# Patient Record
Sex: MALE | Race: ASIAN | NOT HISPANIC OR LATINO
[De-identification: names, ages, dates, MRNs, and addresses within clinical notes are randomized per-mention and may not be internally consistent; named-entity substitution may affect disease eponyms.]

---

## 2019-10-08 PROBLEM — Z00.00 ENCOUNTER FOR PREVENTIVE HEALTH EXAMINATION: Status: ACTIVE | Noted: 2019-10-08

## 2019-10-21 ENCOUNTER — APPOINTMENT (OUTPATIENT)
Dept: ENDOCRINOLOGY | Facility: CLINIC | Age: 59
End: 2019-10-21
Payer: COMMERCIAL

## 2019-10-21 VITALS
HEART RATE: 81 BPM | SYSTOLIC BLOOD PRESSURE: 120 MMHG | OXYGEN SATURATION: 99 % | WEIGHT: 156 LBS | BODY MASS INDEX: 24.78 KG/M2 | DIASTOLIC BLOOD PRESSURE: 70 MMHG | HEIGHT: 66.5 IN

## 2019-10-21 DIAGNOSIS — E11.65 TYPE 2 DIABETES MELLITUS WITH HYPERGLYCEMIA: ICD-10-CM

## 2019-10-21 LAB — GLUCOSE BLDC GLUCOMTR-MCNC: 159

## 2019-10-21 PROCEDURE — 99215 OFFICE O/P EST HI 40 MIN: CPT | Mod: 25

## 2019-10-21 PROCEDURE — 82962 GLUCOSE BLOOD TEST: CPT

## 2019-10-21 NOTE — HISTORY OF PRESENT ILLNESS
[FreeTextEntry1] : Prior patent of Dr Morris, seeing me for the first time\par h/io \par type 2 DM \par dxed 20 yrs back \par on degludec  10-11 units daily \par NovoLog 10-12 units with meals, takes 3 meals a day \par eye exam is due s\par has low BG very rarely \par BG log reviewed feet exam in sep 2018 - redone today \par c/o itching around his neck \par no other complains offered\par stopped statin by himself. \par Review of system \par no chest pain, no palpitations \par no Shortness of breath,no wheezing. \par no heat or cold intolerance ,no flushing \par no headache, no numbness or tingling. \par \par \par \par

## 2019-10-22 LAB
ALBUMIN SERPL ELPH-MCNC: 4.7 G/DL
ANION GAP SERPL CALC-SCNC: 10 MMOL/L
BUN SERPL-MCNC: 14 MG/DL
CALCIUM SERPL-MCNC: 9.3 MG/DL
CHLORIDE SERPL-SCNC: 103 MMOL/L
CHOLEST SERPL-MCNC: 180 MG/DL
CHOLEST/HDLC SERPL: 3.9 RATIO
CO2 SERPL-SCNC: 28 MMOL/L
CREAT SERPL-MCNC: 0.88 MG/DL
CREAT SPEC-SCNC: 101 MG/DL
ESTIMATED AVERAGE GLUCOSE: 180 MG/DL
GLUCOSE SERPL-MCNC: 159 MG/DL
HBA1C MFR BLD HPLC: 7.9 %
HDLC SERPL-MCNC: 46 MG/DL
LDLC SERPL CALC-MCNC: 117 MG/DL
MICROALBUMIN 24H UR DL<=1MG/L-MCNC: <1.2 MG/DL
MICROALBUMIN/CREAT 24H UR-RTO: NORMAL MG/G
PHOSPHATE SERPL-MCNC: 3.2 MG/DL
POTASSIUM SERPL-SCNC: 4.6 MMOL/L
SODIUM SERPL-SCNC: 141 MMOL/L
TRIGL SERPL-MCNC: 86 MG/DL

## 2019-10-23 ENCOUNTER — RECORD ABSTRACTING (OUTPATIENT)
Age: 59
End: 2019-10-23

## 2019-10-23 DIAGNOSIS — E29.1 TESTICULAR HYPOFUNCTION: ICD-10-CM

## 2019-10-23 DIAGNOSIS — N52.9 MALE ERECTILE DYSFUNCTION, UNSPECIFIED: ICD-10-CM

## 2019-10-29 ENCOUNTER — MEDICATION RENEWAL (OUTPATIENT)
Age: 59
End: 2019-10-29

## 2020-01-27 ENCOUNTER — APPOINTMENT (OUTPATIENT)
Dept: ENDOCRINOLOGY | Facility: CLINIC | Age: 60
End: 2020-01-27
Payer: COMMERCIAL

## 2020-01-27 VITALS
SYSTOLIC BLOOD PRESSURE: 120 MMHG | OXYGEN SATURATION: 97 % | HEART RATE: 94 BPM | WEIGHT: 157 LBS | BODY MASS INDEX: 24.96 KG/M2 | DIASTOLIC BLOOD PRESSURE: 64 MMHG

## 2020-01-27 DIAGNOSIS — Z83.3 FAMILY HISTORY OF DIABETES MELLITUS: ICD-10-CM

## 2020-01-27 DIAGNOSIS — Z78.9 OTHER SPECIFIED HEALTH STATUS: ICD-10-CM

## 2020-01-27 LAB — GLUCOSE BLDC GLUCOMTR-MCNC: 177

## 2020-01-27 PROCEDURE — 82962 GLUCOSE BLOOD TEST: CPT

## 2020-01-27 PROCEDURE — 99214 OFFICE O/P EST MOD 30 MIN: CPT | Mod: 25

## 2020-01-27 NOTE — HISTORY OF PRESENT ILLNESS
[FreeTextEntry1] : Prior patent of Dr Morris, seeing me for the first time\par h/io \par type 2 DM \par dxed 20 yrs back \par on degludec  10-11 units daily \par NovoLog 10-12 units with meals, takes 3 meals a day \par eye exam is due s\par has low BG very rarely \par BG log reviewed feet exam in sep 2018 - redone today \par c/o itching around his neck \par no other complains offered\par stopped statin by himself. \par Review of system \par no chest pain, no palpitations \par no Shortness of breath,no wheezing. \par no heat or cold intolerance ,no flushing \par no headache, no numbness or tingling. \par \par \par \par 01/27/2020- FOLLOW UP\par discussed labs from last time \par A1c was not at goal \par this time BG log suggest BG in target \par low BG 1-2 times a month \par did not start statin, LDL is 120 mg % \par still working night shifts\par Review of system \par no chest pain, no palpitations \par no Shortness of breath,no wheezing. \par \par

## 2020-01-28 LAB
ESTIMATED AVERAGE GLUCOSE: 197 MG/DL
HBA1C MFR BLD HPLC: 8.5 %

## 2020-05-11 ENCOUNTER — APPOINTMENT (OUTPATIENT)
Dept: ENDOCRINOLOGY | Facility: CLINIC | Age: 60
End: 2020-05-11

## 2021-01-22 ENCOUNTER — APPOINTMENT (OUTPATIENT)
Dept: ENDOCRINOLOGY | Facility: CLINIC | Age: 61
End: 2021-01-22
Payer: MEDICAID

## 2021-01-22 ENCOUNTER — LABORATORY RESULT (OUTPATIENT)
Age: 61
End: 2021-01-22

## 2021-01-22 VITALS
OXYGEN SATURATION: 98 % | SYSTOLIC BLOOD PRESSURE: 160 MMHG | HEIGHT: 66.5 IN | DIASTOLIC BLOOD PRESSURE: 70 MMHG | HEART RATE: 80 BPM | BODY MASS INDEX: 26.05 KG/M2 | WEIGHT: 164 LBS

## 2021-01-22 LAB — GLUCOSE BLDC GLUCOMTR-MCNC: 153

## 2021-01-22 PROCEDURE — 99072 ADDL SUPL MATRL&STAF TM PHE: CPT

## 2021-01-22 PROCEDURE — 82962 GLUCOSE BLOOD TEST: CPT

## 2021-01-22 PROCEDURE — 99214 OFFICE O/P EST MOD 30 MIN: CPT | Mod: 25

## 2021-01-22 RX ORDER — SIMVASTATIN 20 MG/1
20 TABLET, FILM COATED ORAL
Refills: 0 | Status: DISCONTINUED | COMMUNITY
End: 2021-01-22

## 2021-01-22 NOTE — HISTORY OF PRESENT ILLNESS
[FreeTextEntry1] : Prior patent of Dr Morris, seeing me for the first time\par h/io \par type 2 DM \par dxed 20 yrs back \par on degludec  10-11 units daily \par NovoLog 10-12 units with meals, takes 3 meals a day \par eye exam is due s\par has low BG very rarely \par BG log reviewed feet exam in sep 2018 - redone today \par c/o itching around his neck \par no other complains offered\par stopped statin by himself. \par Review of system \par no chest pain, no palpitations \par no Shortness of breath,no wheezing. \par no heat or cold intolerance ,no flushing \par no headache, no numbness or tingling. \par \par \par \par 01/27/2020- FOLLOW UP\par discussed labs from last time \par A1c was not at goal \par this time BG log suggest BG in target \par low BG 1-2 times a month \par did not start statin, LDL is 120 mg % \par still working night shifts\par Review of system \par no chest pain, no palpitations \par no Shortness of breath,no wheezing. \par \par \par 01/22/2021- FOLLOW UP\par Patient continues to take Tresiba 11 units and sliding scale.  He is having frequent hypoglycemia to the tune of 2 times per week.  He had recent labs with his primary care physician approximately 2 months back A1c was elevated and had suppressed TSH which was not followed up on.  He has parotid swelling which was imaged and is being followed up at this point of time.  He has started on statin which she is taking regularly.  He is also seeing his eye physician and has trouble with his left eye.

## 2021-01-22 NOTE — REASON FOR VISIT
[Follow - Up] : a follow-up visit [Follow-Up: _____] : a [unfilled] follow-up visit [FreeTextEntry1] : TYpe 2 diabetes

## 2021-01-28 LAB
ALBUMIN SERPL ELPH-MCNC: 4.4 G/DL
ANION GAP SERPL CALC-SCNC: 14 MMOL/L
BUN SERPL-MCNC: 14 MG/DL
CALCIUM SERPL-MCNC: 9.3 MG/DL
CHLORIDE SERPL-SCNC: 104 MMOL/L
CHOLEST SERPL-MCNC: 192 MG/DL
CO2 SERPL-SCNC: 23 MMOL/L
CREAT SERPL-MCNC: 1 MG/DL
CREAT SPEC-SCNC: 107 MG/DL
ESTIMATED AVERAGE GLUCOSE: 223 MG/DL
GLUCOSE SERPL-MCNC: 152 MG/DL
HBA1C MFR BLD HPLC: 9.4 %
HDLC SERPL-MCNC: 52 MG/DL
LDLC SERPL CALC-MCNC: 122 MG/DL
MICROALBUMIN 24H UR DL<=1MG/L-MCNC: <1.2 MG/DL
MICROALBUMIN/CREAT 24H UR-RTO: NORMAL MG/G
NONHDLC SERPL-MCNC: 140 MG/DL
PHOSPHATE SERPL-MCNC: 3.1 MG/DL
POTASSIUM SERPL-SCNC: 4.7 MMOL/L
SODIUM SERPL-SCNC: 142 MMOL/L
THYROGLOB AB SERPL-ACNC: <20 IU/ML
THYROPEROXIDASE AB SERPL IA-ACNC: 10.6 IU/ML
TRIGL SERPL-MCNC: 90 MG/DL
TSH RECEPTOR AB: <1.1 IU/L
TSI ACT/NOR SER: <0.1 IU/L

## 2021-02-05 ENCOUNTER — APPOINTMENT (OUTPATIENT)
Dept: ENDOCRINOLOGY | Facility: CLINIC | Age: 61
End: 2021-02-05
Payer: MEDICAID

## 2021-02-05 PROCEDURE — ZZZZZ: CPT

## 2021-02-09 ENCOUNTER — APPOINTMENT (OUTPATIENT)
Dept: ENDOCRINOLOGY | Facility: CLINIC | Age: 61
End: 2021-02-09
Payer: MEDICAID

## 2021-02-09 PROCEDURE — 95250 CONT GLUC MNTR PHYS/QHP EQP: CPT

## 2021-02-09 PROCEDURE — 99072 ADDL SUPL MATRL&STAF TM PHE: CPT

## 2021-02-09 PROCEDURE — 95251 CONT GLUC MNTR ANALYSIS I&R: CPT

## 2021-02-22 ENCOUNTER — APPOINTMENT (OUTPATIENT)
Dept: ENDOCRINOLOGY | Facility: CLINIC | Age: 61
End: 2021-02-22
Payer: MEDICAID

## 2021-02-22 VITALS
SYSTOLIC BLOOD PRESSURE: 158 MMHG | DIASTOLIC BLOOD PRESSURE: 70 MMHG | HEART RATE: 79 BPM | HEIGHT: 66.5 IN | BODY MASS INDEX: 26.2 KG/M2 | OXYGEN SATURATION: 98 % | WEIGHT: 165 LBS

## 2021-02-22 LAB — GLUCOSE BLDC GLUCOMTR-MCNC: 191

## 2021-02-22 PROCEDURE — 99214 OFFICE O/P EST MOD 30 MIN: CPT | Mod: 25

## 2021-02-22 PROCEDURE — 82962 GLUCOSE BLOOD TEST: CPT

## 2021-02-22 PROCEDURE — 99072 ADDL SUPL MATRL&STAF TM PHE: CPT

## 2021-02-22 NOTE — HISTORY OF PRESENT ILLNESS
[FreeTextEntry1] : Prior patent of Dr Morris, seeing me for the first time\par h/io \par type 2 DM \par dxed 20 yrs back \par on degludec  10-11 units daily \par NovoLog 10-12 units with meals, takes 3 meals a day \par eye exam is due s\par has low BG very rarely \par BG log reviewed feet exam in sep 2018 - redone today \par c/o itching around his neck \par no other complains offered\par stopped statin by himself. \par Review of system \par no chest pain, no palpitations \par no Shortness of breath,no wheezing. \par no heat or cold intolerance ,no flushing \par no headache, no numbness or tingling. \par \par \par \par 01/27/2020- FOLLOW UP\par discussed labs from last time \par A1c was not at goal \par this time BG log suggest BG in target \par low BG 1-2 times a month \par did not start statin, LDL is 120 mg % \par still working night shifts\par Review of system \par no chest pain, no palpitations \par no Shortness of breath,no wheezing. \par \par \par 01/22/2021- FOLLOW UP\par Patient continues to take Tresiba 11 units and sliding scale.  He is having frequent hypoglycemia to the tune of 2 times per week.  He had recent labs with his primary care physician approximately 2 months back A1c was elevated and had suppressed TSH which was not followed up on.  He has parotid swelling which was imaged and is being followed up at this point of time.  He has started on statin which she is taking regularly.  He is also seeing his eye physician and has trouble with his left eye.\par \par \par 02/22/2021- FOLLOW UP\par Patient continues to take Tresiba 12 units and sliding scale.  He does have hypoglycemia around 12 midnight and post lunch.  I discussed the sliding scale with him and on further questioning I realized that he has been dosing himself more than his sliding scale which does make sense as he has been getting frequent hypoglycemia.  He is more compliant with his diet now.

## 2021-02-22 NOTE — ASSESSMENT
[Hypoglycemia Management] : hypoglycemia management [Action and use of Insulin] : action and use of short and long-acting insulin

## 2021-03-26 ENCOUNTER — APPOINTMENT (OUTPATIENT)
Dept: ENDOCRINOLOGY | Facility: CLINIC | Age: 61
End: 2021-03-26

## 2021-05-03 ENCOUNTER — APPOINTMENT (OUTPATIENT)
Dept: ENDOCRINOLOGY | Facility: CLINIC | Age: 61
End: 2021-05-03

## 2021-07-26 ENCOUNTER — APPOINTMENT (OUTPATIENT)
Dept: ENDOCRINOLOGY | Facility: CLINIC | Age: 61
End: 2021-07-26
Payer: COMMERCIAL

## 2021-07-26 VITALS
HEART RATE: 78 BPM | HEIGHT: 66.5 IN | SYSTOLIC BLOOD PRESSURE: 140 MMHG | DIASTOLIC BLOOD PRESSURE: 60 MMHG | OXYGEN SATURATION: 99 % | WEIGHT: 153 LBS | BODY MASS INDEX: 24.3 KG/M2

## 2021-07-26 LAB — GLUCOSE BLDC GLUCOMTR-MCNC: 156

## 2021-07-26 PROCEDURE — 99214 OFFICE O/P EST MOD 30 MIN: CPT | Mod: 25

## 2021-07-26 PROCEDURE — 82962 GLUCOSE BLOOD TEST: CPT

## 2021-08-09 NOTE — HISTORY OF PRESENT ILLNESS
[FreeTextEntry1] : Prior patent of Dr Morris, seeing me for the first time\par h/io \par type 2 DM \par dxed 20 yrs back \par on degludec  10-11 units daily \par NovoLog 10-12 units with meals, takes 3 meals a day \par eye exam is due s\par has low BG very rarely \par BG log reviewed feet exam in sep 2018 - redone today \par c/o itching around his neck \par no other complains offered\par stopped statin by himself. \par Review of system \par no chest pain, no palpitations \par no Shortness of breath,no wheezing. \par no heat or cold intolerance ,no flushing \par no headache, no numbness or tingling. \par \par \par \par 01/27/2020- FOLLOW UP\par discussed labs from last time \par A1c was not at goal \par this time BG log suggest BG in target \par low BG 1-2 times a month \par did not start statin, LDL is 120 mg % \par still working night shifts\par Review of system \par no chest pain, no palpitations \par no Shortness of breath,no wheezing. \par \par \par 01/22/2021- FOLLOW UP\par Patient continues to take Tresiba 11 units and sliding scale.  He is having frequent hypoglycemia to the tune of 2 times per week.  He had recent labs with his primary care physician approximately 2 months back A1c was elevated and had suppressed TSH which was not followed up on.  He has parotid swelling which was imaged and is being followed up at this point of time.  He has started on statin which she is taking regularly.  He is also seeing his eye physician and has trouble with his left eye.\par \par \par 02/22/2021- FOLLOW UP\par Patient continues to take Tresiba 12 units and sliding scale.  He does have hypoglycemia around 12 midnight and post lunch.  I discussed the sliding scale with him and on further questioning I realized that he has been dosing himself more than his sliding scale which does make sense as he has been getting frequent hypoglycemia.  He is more compliant with his diet now.\par \par \par \par 07/26/2021- FOLLOW UP\par Patient is on Tresiba 12 to 40 units at bedtime and NovoLog scale.  His blood sugar is 156 mg percent he sometimes gets low blood sugars when he overdoses himself\par GLYCEMIC LOG WAS REVIEWED AND SCANNED IN THE SYSTEM\par

## 2021-10-13 LAB
ALBUMIN SERPL ELPH-MCNC: 4.3 G/DL
ALBUMIN SERPL ELPH-MCNC: 4.3 G/DL
ALP BLD-CCNC: 97 U/L
ALT SERPL-CCNC: 10 U/L
ANION GAP SERPL CALC-SCNC: 10 MMOL/L
AST SERPL-CCNC: 15 U/L
BILIRUB DIRECT SERPL-MCNC: 0.1 MG/DL
BILIRUB INDIRECT SERPL-MCNC: 0.2 MG/DL
BILIRUB SERPL-MCNC: 0.3 MG/DL
BUN SERPL-MCNC: 15 MG/DL
CALCIUM SERPL-MCNC: 9.2 MG/DL
CHLORIDE SERPL-SCNC: 106 MMOL/L
CHOLEST SERPL-MCNC: 171 MG/DL
CO2 SERPL-SCNC: 29 MMOL/L
CREAT SERPL-MCNC: 0.89 MG/DL
CREAT SPEC-SCNC: 108 MG/DL
ESTIMATED AVERAGE GLUCOSE: 203 MG/DL
GLUCOSE SERPL-MCNC: 113 MG/DL
HBA1C MFR BLD HPLC: 8.7 %
HDLC SERPL-MCNC: 48 MG/DL
LDLC SERPL CALC-MCNC: 110 MG/DL
MICROALBUMIN 24H UR DL<=1MG/L-MCNC: <1.2 MG/DL
MICROALBUMIN/CREAT 24H UR-RTO: NORMAL MG/G
NONHDLC SERPL-MCNC: 123 MG/DL
PHOSPHATE SERPL-MCNC: 3.4 MG/DL
POTASSIUM SERPL-SCNC: 4.5 MMOL/L
PROT SERPL-MCNC: 6.8 G/DL
SODIUM SERPL-SCNC: 144 MMOL/L
TRIGL SERPL-MCNC: 66 MG/DL
TSH SERPL-ACNC: 0.38 UIU/ML

## 2021-10-20 ENCOUNTER — APPOINTMENT (OUTPATIENT)
Dept: ENDOCRINOLOGY | Facility: CLINIC | Age: 61
End: 2021-10-20
Payer: COMMERCIAL

## 2021-10-20 VITALS
DIASTOLIC BLOOD PRESSURE: 70 MMHG | OXYGEN SATURATION: 97 % | WEIGHT: 153 LBS | SYSTOLIC BLOOD PRESSURE: 130 MMHG | HEIGHT: 66.5 IN | BODY MASS INDEX: 24.3 KG/M2 | HEART RATE: 81 BPM

## 2021-10-20 LAB — GLUCOSE BLDC GLUCOMTR-MCNC: 131

## 2021-10-20 PROCEDURE — 82962 GLUCOSE BLOOD TEST: CPT

## 2021-10-20 PROCEDURE — 99214 OFFICE O/P EST MOD 30 MIN: CPT | Mod: 25

## 2021-10-20 NOTE — ASSESSMENT
[Action and use of Insulin] : action and use of short and long-acting insulin [Importance of Diet and Exercise] : importance of diet and exercise to improve glycemic control, achieve weight loss and improve cardiovascular health [Hypoglycemia Management] : hypoglycemia management

## 2021-10-20 NOTE — HISTORY OF PRESENT ILLNESS
[FreeTextEntry1] : Prior patent of Dr Morris, seeing me for the first time\par h/io \par type 2 DM \par dxed 20 yrs back \par on degludec  10-11 units daily \par NovoLog 10-12 units with meals, takes 3 meals a day \par eye exam is due s\par has low BG very rarely \par BG log reviewed feet exam in sep 2018 - redone today \par c/o itching around his neck \par no other complains offered\par stopped statin by himself. \par Review of system \par no chest pain, no palpitations \par no Shortness of breath,no wheezing. \par no heat or cold intolerance ,no flushing \par no headache, no numbness or tingling. \par \par \par \par 01/27/2020- FOLLOW UP\par discussed labs from last time \par A1c was not at goal \par this time BG log suggest BG in target \par low BG 1-2 times a month \par did not start statin, LDL is 120 mg % \par still working night shifts\par Review of system \par no chest pain, no palpitations \par no Shortness of breath,no wheezing. \par \par \par 01/22/2021- FOLLOW UP\par Patient continues to take Tresiba 11 units and sliding scale.  He is having frequent hypoglycemia to the tune of 2 times per week.  He had recent labs with his primary care physician approximately 2 months back A1c was elevated and had suppressed TSH which was not followed up on.  He has parotid swelling which was imaged and is being followed up at this point of time.  He has started on statin which she is taking regularly.  He is also seeing his eye physician and has trouble with his left eye.\par \par \par 02/22/2021- FOLLOW UP\par Patient continues to take Tresiba 12 units and sliding scale.  He does have hypoglycemia around 12 midnight and post lunch.  I discussed the sliding scale with him and on further questioning I realized that he has been dosing himself more than his sliding scale which does make sense as he has been getting frequent hypoglycemia.  He is more compliant with his diet now.\par \par \par \par 07/26/2021- FOLLOW UP\par Patient is on Tresiba 12 to 14  units at bedtime and NovoLog scale.  His blood sugar is 156 mg percent he sometimes gets low blood sugars when he overdoses himself\par GLYCEMIC LOG WAS REVIEWED AND SCANNED IN THE SYSTEM\par \par \par 10/20/2021- FOLLOW UP\par GLYCEMIC LOG WAS REVIEWED AND SCANNED IN THE SYSTEM\par  SANTOS HALE is here to discuss test results , labs and further plan of care \par labs discussed in detail-  \par Gets low blood sugars when misses meals.\par no acute medical issues in the interim\par

## 2022-02-28 ENCOUNTER — APPOINTMENT (OUTPATIENT)
Dept: ENDOCRINOLOGY | Facility: CLINIC | Age: 62
End: 2022-02-28
Payer: COMMERCIAL

## 2022-02-28 VITALS
WEIGHT: 157 LBS | HEART RATE: 83 BPM | HEIGHT: 66.5 IN | OXYGEN SATURATION: 98 % | SYSTOLIC BLOOD PRESSURE: 130 MMHG | BODY MASS INDEX: 24.93 KG/M2 | DIASTOLIC BLOOD PRESSURE: 60 MMHG

## 2022-02-28 LAB — GLUCOSE BLDC GLUCOMTR-MCNC: 137

## 2022-02-28 PROCEDURE — 99214 OFFICE O/P EST MOD 30 MIN: CPT | Mod: 25

## 2022-02-28 PROCEDURE — 82962 GLUCOSE BLOOD TEST: CPT

## 2022-02-28 RX ORDER — INSULIN ASPART 100 [IU]/ML
100 INJECTION, SOLUTION INTRAVENOUS; SUBCUTANEOUS
Qty: 12 | Refills: 0 | Status: DISCONTINUED | COMMUNITY
End: 2022-02-28

## 2022-02-28 RX ORDER — INSULIN DEGLUDEC INJECTION 100 U/ML
100 INJECTION, SOLUTION SUBCUTANEOUS
Qty: 2 | Refills: 0 | Status: DISCONTINUED | COMMUNITY
End: 2022-02-28

## 2022-02-28 NOTE — ASSESSMENT
[Importance of Diet and Exercise] : importance of diet and exercise to improve glycemic control, achieve weight loss and improve cardiovascular health [Hypoglycemia Management] : hypoglycemia management [Action and use of Insulin] : action and use of short and long-acting insulin

## 2022-02-28 NOTE — HISTORY OF PRESENT ILLNESS
[FreeTextEntry1] : Prior patent of Dr Morris, seeing me for the first time\par h/io \par type 2 DM \par dxed 20 yrs back \par on degludec  10-11 units daily \par NovoLog 10-12 units with meals, takes 3 meals a day \par eye exam is due s\par has low BG very rarely \par BG log reviewed feet exam in sep 2018 - redone today \par c/o itching around his neck \par no other complains offered\par stopped statin by himself. \par Review of system \par no chest pain, no palpitations \par no Shortness of breath,no wheezing. \par no heat or cold intolerance ,no flushing \par no headache, no numbness or tingling. \par \par \par \par 01/27/2020- FOLLOW UP\par discussed labs from last time \par A1c was not at goal \par this time BG log suggest BG in target \par low BG 1-2 times a month \par did not start statin, LDL is 120 mg % \par still working night shifts\par Review of system \par no chest pain, no palpitations \par no Shortness of breath,no wheezing. \par \par \par 01/22/2021- FOLLOW UP\par Patient continues to take Tresiba 11 units and sliding scale.  He is having frequent hypoglycemia to the tune of 2 times per week.  He had recent labs with his primary care physician approximately 2 months back A1c was elevated and had suppressed TSH which was not followed up on.  He has parotid swelling which was imaged and is being followed up at this point of time.  He has started on statin which she is taking regularly.  He is also seeing his eye physician and has trouble with his left eye.\par \par \par 02/22/2021- FOLLOW UP\par Patient continues to take Tresiba 12 units and sliding scale.  He does have hypoglycemia around 12 midnight and post lunch.  I discussed the sliding scale with him and on further questioning I realized that he has been dosing himself more than his sliding scale which does make sense as he has been getting frequent hypoglycemia.  He is more compliant with his diet now.\par \par \par \par 07/26/2021- FOLLOW UP\par Patient is on Tresiba 12 to 14  units at bedtime and NovoLog scale.  His blood sugar is 156 mg percent he sometimes gets low blood sugars when he overdoses himself\par GLYCEMIC LOG WAS REVIEWED AND SCANNED IN THE SYSTEM\par \par \par 10/20/2021- FOLLOW UP\par GLYCEMIC LOG WAS REVIEWED AND SCANNED IN THE SYSTEM\par  SANTOS HALE is here to discuss test results , labs and further plan of care \par labs discussed in detail-  \par Gets low blood sugars when misses meals.\par no acute medical issues in the interim\par \par \par 02/28/2022- FOLLOW UP\par GLYCEMIC LOG WAS REVIEWED AND SCANNED IN THE SYSTEM\par Continues to have glycemic variability.  On Lantus 12 units at bedtime along 8 to 10 units before meals.  Complains of fatigue.  No new medical diagnoses as such.  Does not want to trial any new medications for diabetes.  We discussed that this can be an issue with the current regimen he is on.  Denies any significant hypoglycemic events requiring assistance of other people.  I will check his basic labs and vitamin D levels.  Will make recommendations.  Follow-up in 3 months from now.

## 2022-03-03 LAB
ALBUMIN SERPL ELPH-MCNC: 4.4 G/DL
ALBUMIN SERPL ELPH-MCNC: 4.4 G/DL
ALP BLD-CCNC: 102 U/L
ALT SERPL-CCNC: 12 U/L
ANION GAP SERPL CALC-SCNC: 11 MMOL/L
AST SERPL-CCNC: 17 U/L
BILIRUB DIRECT SERPL-MCNC: 0.1 MG/DL
BILIRUB INDIRECT SERPL-MCNC: 0.2 MG/DL
BILIRUB SERPL-MCNC: 0.3 MG/DL
BUN SERPL-MCNC: 13 MG/DL
C PEPTIDE SERPL-MCNC: 0.5 NG/ML
CALCIUM SERPL-MCNC: 9.3 MG/DL
CHLORIDE SERPL-SCNC: 105 MMOL/L
CHOLEST SERPL-MCNC: 191 MG/DL
CO2 SERPL-SCNC: 26 MMOL/L
CREAT SERPL-MCNC: 0.9 MG/DL
EGFR: 98 ML/MIN/1.73M2
ESTIMATED AVERAGE GLUCOSE: 260 MG/DL
GLUCOSE SERPL-MCNC: 145 MG/DL
HBA1C MFR BLD HPLC: 10.7 %
HCT VFR BLD CALC: 43.6 %
HDLC SERPL-MCNC: 56 MG/DL
HGB BLD-MCNC: 13.6 G/DL
LDLC SERPL CALC-MCNC: 122 MG/DL
NONHDLC SERPL-MCNC: 135 MG/DL
PHOSPHATE SERPL-MCNC: 3.2 MG/DL
POTASSIUM SERPL-SCNC: 4.8 MMOL/L
PROT SERPL-MCNC: 6.8 G/DL
SODIUM SERPL-SCNC: 141 MMOL/L
T4 FREE SERPL-MCNC: 1.4 NG/DL
THYROGLOB AB SERPL-ACNC: <20 IU/ML
THYROPEROXIDASE AB SERPL IA-ACNC: <10 IU/ML
TRIGL SERPL-MCNC: 66 MG/DL
TSH SERPL-ACNC: 0.33 UIU/ML

## 2022-03-04 LAB — 25(OH)D3 SERPL-MCNC: 25.4 NG/ML

## 2022-03-07 ENCOUNTER — NON-APPOINTMENT (OUTPATIENT)
Age: 62
End: 2022-03-07

## 2022-03-28 RX ORDER — PEN NEEDLE, DIABETIC 29 G X1/2"
32G X 4 MM NEEDLE, DISPOSABLE MISCELLANEOUS
Qty: 4 | Refills: 3 | Status: ACTIVE | COMMUNITY
Start: 1900-01-01 | End: 1900-01-01

## 2022-06-20 ENCOUNTER — APPOINTMENT (OUTPATIENT)
Dept: ENDOCRINOLOGY | Facility: CLINIC | Age: 62
End: 2022-06-20
Payer: COMMERCIAL

## 2022-06-20 VITALS
HEART RATE: 70 BPM | SYSTOLIC BLOOD PRESSURE: 128 MMHG | BODY MASS INDEX: 25.12 KG/M2 | OXYGEN SATURATION: 99 % | DIASTOLIC BLOOD PRESSURE: 70 MMHG | WEIGHT: 158 LBS

## 2022-06-20 LAB — GLUCOSE BLDC GLUCOMTR-MCNC: 149

## 2022-06-20 PROCEDURE — 82962 GLUCOSE BLOOD TEST: CPT

## 2022-06-20 PROCEDURE — 99215 OFFICE O/P EST HI 40 MIN: CPT

## 2022-06-20 NOTE — ASSESSMENT
[Exercise/Effect on Glucose] : exercise/effect on glucose [Ketone Testing] : ketone testing [Action and use of Insulin] : action and use of short and long-acting insulin [Self Monitoring of Blood Glucose] : self monitoring of blood glucose [Sick-Day Management] : sick-day management

## 2022-06-20 NOTE — HISTORY OF PRESENT ILLNESS
[FreeTextEntry1] : Prior patent of Dr Morris, seeing me for the first time\par h/io \par type 2 DM \par dxed 20 yrs back \par on degludec  10-11 units daily \par NovoLog 10-12 units with meals, takes 3 meals a day \par eye exam is due s\par has low BG very rarely \par BG log reviewed feet exam in sep 2018 - redone today \par c/o itching around his neck \par no other complains offered\par stopped statin by himself. \par Review of system \par no chest pain, no palpitations \par no Shortness of breath,no wheezing. \par no heat or cold intolerance ,no flushing \par no headache, no numbness or tingling. \par \par \par \par 01/27/2020- FOLLOW UP\par discussed labs from last time \par A1c was not at goal \par this time BG log suggest BG in target \par low BG 1-2 times a month \par did not start statin, LDL is 120 mg % \par still working night shifts\par Review of system \par no chest pain, no palpitations \par no Shortness of breath,no wheezing. \par \par \par 01/22/2021- FOLLOW UP\par Patient continues to take Tresiba 11 units and sliding scale.  He is having frequent hypoglycemia to the tune of 2 times per week.  He had recent labs with his primary care physician approximately 2 months back A1c was elevated and had suppressed TSH which was not followed up on.  He has parotid swelling which was imaged and is being followed up at this point of time.  He has started on statin which she is taking regularly.  He is also seeing his eye physician and has trouble with his left eye.\par \par \par 02/22/2021- FOLLOW UP\par Patient continues to take Tresiba 12 units and sliding scale.  He does have hypoglycemia around 12 midnight and post lunch.  I discussed the sliding scale with him and on further questioning I realized that he has been dosing himself more than his sliding scale which does make sense as he has been getting frequent hypoglycemia.  He is more compliant with his diet now.\par \par \par \par 07/26/2021- FOLLOW UP\par Patient is on Tresiba 12 to 14  units at bedtime and NovoLog scale.  His blood sugar is 156 mg percent he sometimes gets low blood sugars when he overdoses himself\par GLYCEMIC LOG WAS REVIEWED AND SCANNED IN THE SYSTEM\par \par \par 10/20/2021- FOLLOW UP\par GLYCEMIC LOG WAS REVIEWED AND SCANNED IN THE SYSTEM\par  SANTOS HALE is here to discuss test results , labs and further plan of care \par labs discussed in detail-  \par Gets low blood sugars when misses meals.\par no acute medical issues in the interim\par \par \par 02/28/2022- FOLLOW UP\par GLYCEMIC LOG WAS REVIEWED AND SCANNED IN THE SYSTEM\par Continues to have glycemic variability.  On Lantus 12 units at bedtime along 8 to 10 units before meals.  Complains of fatigue.  No new medical diagnoses as such.  Does not want to trial any new medications for diabetes.  We discussed that this can be an issue with the current regimen he is on.  Denies any significant hypoglycemic events requiring assistance of other people.  I will check his basic labs and vitamin D levels.  Will make recommendations.  Follow-up in 3 months from now.\par \par \par 06/20/2022- FOLLOW UP\par No glycemic log to review today.  Currently on Lantus 11 units at bedtime and Humalog 10 to 12 units 3 times a day.  Blood sugars average around 1 60-1 90 milligrams percent no severely frequent low blood sugars.  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  I do not think he will have good response to other diabetic medications.  Discussed the use of CGM and high risk of DKA.  But to miss his long-acting insulin.  Patient is agreeable and understands the disease information packet given to the patient.  He is not keen on using a CGM or an extensive diabetic teaching at this point.  Discussed the risk of hypoglycemia with him.  I will again screen him for any thyroid dysfunction and other labs.  All questions and concerns addressed.

## 2022-06-21 LAB
ALBUMIN SERPL ELPH-MCNC: 4.6 G/DL
ALBUMIN SERPL ELPH-MCNC: 4.6 G/DL
ALP BLD-CCNC: 91 U/L
ALT SERPL-CCNC: 15 U/L
ANION GAP SERPL CALC-SCNC: 11 MMOL/L
AST SERPL-CCNC: 28 U/L
BILIRUB DIRECT SERPL-MCNC: 0.1 MG/DL
BILIRUB INDIRECT SERPL-MCNC: 0.2 MG/DL
BILIRUB SERPL-MCNC: 0.3 MG/DL
BUN SERPL-MCNC: 16 MG/DL
CALCIUM SERPL-MCNC: 9.5 MG/DL
CHLORIDE SERPL-SCNC: 105 MMOL/L
CHOLEST SERPL-MCNC: 191 MG/DL
CO2 SERPL-SCNC: 26 MMOL/L
CREAT SERPL-MCNC: 0.87 MG/DL
EGFR: 99 ML/MIN/1.73M2
ESTIMATED AVERAGE GLUCOSE: 214 MG/DL
GLUCOSE SERPL-MCNC: 155 MG/DL
HBA1C MFR BLD HPLC: 9.1 %
HDLC SERPL-MCNC: 59 MG/DL
LDLC SERPL CALC-MCNC: 121 MG/DL
NONHDLC SERPL-MCNC: 132 MG/DL
PHOSPHATE SERPL-MCNC: 3.2 MG/DL
POTASSIUM SERPL-SCNC: 4.7 MMOL/L
PROT SERPL-MCNC: 7 G/DL
SODIUM SERPL-SCNC: 142 MMOL/L
THYROGLOB AB SERPL-ACNC: <20 IU/ML
THYROPEROXIDASE AB SERPL IA-ACNC: <10 IU/ML
TRIGL SERPL-MCNC: 57 MG/DL
TSH SERPL-ACNC: 0.6 UIU/ML

## 2022-06-22 ENCOUNTER — NON-APPOINTMENT (OUTPATIENT)
Age: 62
End: 2022-06-22

## 2022-07-07 ENCOUNTER — RX RENEWAL (OUTPATIENT)
Age: 62
End: 2022-07-07

## 2022-09-29 RX ORDER — INSULIN GLARGINE 100 [IU]/ML
100 INJECTION, SOLUTION SUBCUTANEOUS
Qty: 1 | Refills: 1 | Status: DISCONTINUED | COMMUNITY
Start: 2021-08-06 | End: 2022-09-29

## 2022-10-13 ENCOUNTER — APPOINTMENT (OUTPATIENT)
Dept: ENDOCRINOLOGY | Facility: CLINIC | Age: 62
End: 2022-10-13

## 2022-10-13 VITALS
DIASTOLIC BLOOD PRESSURE: 70 MMHG | SYSTOLIC BLOOD PRESSURE: 115 MMHG | OXYGEN SATURATION: 99 % | HEART RATE: 62 BPM | WEIGHT: 160 LBS | HEIGHT: 66 IN | BODY MASS INDEX: 25.71 KG/M2

## 2022-10-13 LAB — GLUCOSE BLDC GLUCOMTR-MCNC: 81

## 2022-10-13 PROCEDURE — 99214 OFFICE O/P EST MOD 30 MIN: CPT | Mod: 25

## 2022-10-13 PROCEDURE — 82962 GLUCOSE BLOOD TEST: CPT

## 2022-10-13 NOTE — HISTORY OF PRESENT ILLNESS
[FreeTextEntry1] : Prior patent of Dr Morris, seeing me for the first time\par h/io \par type 2 DM \par dxed 20 yrs back \par on degludec  10-11 units daily \par NovoLog 10-12 units with meals, takes 3 meals a day \par eye exam is due s\par has low BG very rarely \par BG log reviewed feet exam in sep 2018 - redone today \par c/o itching around his neck \par no other complains offered\par stopped statin by himself. \par Review of system \par no chest pain, no palpitations \par no Shortness of breath,no wheezing. \par no heat or cold intolerance ,no flushing \par no headache, no numbness or tingling. \par \par \par \par 01/27/2020- FOLLOW UP\par discussed labs from last time \par A1c was not at goal \par this time BG log suggest BG in target \par low BG 1-2 times a month \par did not start statin, LDL is 120 mg % \par still working night shifts\par Review of system \par no chest pain, no palpitations \par no Shortness of breath,no wheezing. \par \par \par 01/22/2021- FOLLOW UP\par Patient continues to take Tresiba 11 units and sliding scale.  He is having frequent hypoglycemia to the tune of 2 times per week.  He had recent labs with his primary care physician approximately 2 months back A1c was elevated and had suppressed TSH which was not followed up on.  He has parotid swelling which was imaged and is being followed up at this point of time.  He has started on statin which she is taking regularly.  He is also seeing his eye physician and has trouble with his left eye.\par \par \par 02/22/2021- FOLLOW UP\par Patient continues to take Tresiba 12 units and sliding scale.  He does have hypoglycemia around 12 midnight and post lunch.  I discussed the sliding scale with him and on further questioning I realized that he has been dosing himself more than his sliding scale which does make sense as he has been getting frequent hypoglycemia.  He is more compliant with his diet now.\par \par \par \par 07/26/2021- FOLLOW UP\par Patient is on Tresiba 12 to 14  units at bedtime and NovoLog scale.  His blood sugar is 156 mg percent he sometimes gets low blood sugars when he overdoses himself\par GLYCEMIC LOG WAS REVIEWED AND SCANNED IN THE SYSTEM\par \par \par 10/20/2021- FOLLOW UP\par GLYCEMIC LOG WAS REVIEWED AND SCANNED IN THE SYSTEM\par  SANTOS HALE is here to discuss test results , labs and further plan of care \par labs discussed in detail-  \par Gets low blood sugars when misses meals.\par no acute medical issues in the interim\par \par \par 02/28/2022- FOLLOW UP\par GLYCEMIC LOG WAS REVIEWED AND SCANNED IN THE SYSTEM\par Continues to have glycemic variability.  On Lantus 12 units at bedtime along 8 to 10 units before meals.  Complains of fatigue.  No new medical diagnoses as such.  Does not want to trial any new medications for diabetes.  We discussed that this can be an issue with the current regimen he is on.  Denies any significant hypoglycemic events requiring assistance of other people.  I will check his basic labs and vitamin D levels.  Will make recommendations.  Follow-up in 3 months from now.\par \par \par 06/20/2022- FOLLOW UP\par No glycemic log to review today.  Currently on Lantus 11 units at bedtime and Humalog 10 to 12 units 3 times a day.  Blood sugars average around 1 60-1 90 milligrams percent no severely frequent low blood sugars.  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  I do not think he will have good response to other diabetic medications.  Discussed the use of CGM and high risk of DKA.  But to miss his long-acting insulin.  Patient is agreeable and understands the disease information packet given to the patient.  He is not keen on using a CGM or an extensive diabetic teaching at this point.  Discussed the risk of hypoglycemia with him.  I will again screen him for any thyroid dysfunction and other labs.  All questions and concerns addressed.\par \par 10/13/2022- FOLLOW UP\par GLYCEMIC LOG WAS REVIEWED AND SCANNED IN THE SYSTEM\par Currently on Humalog 10 to 12 units 3 times a day Lantus 11 units such as.  Has low blood sugars when he forgets to eat.  Discussed the importance of regular meals.  I reviewed the A1c levels with him he refuses to use a anna.  Advised to get labs today and if A1c is still trending high will place him on anna.  Check relevant labs today.  Advised the importance of tight glycemic control to the complications regular mealtimes.  All questions and concerns addressed follow-up in 3 months from now.

## 2022-10-18 LAB
ALBUMIN SERPL ELPH-MCNC: 4.4 G/DL
ALBUMIN SERPL ELPH-MCNC: 4.4 G/DL
ALP BLD-CCNC: 74 U/L
ALT SERPL-CCNC: 9 U/L
ANION GAP SERPL CALC-SCNC: 10 MMOL/L
AST SERPL-CCNC: <5 U/L
BILIRUB DIRECT SERPL-MCNC: 0 MG/DL
BILIRUB INDIRECT SERPL-MCNC: 0.2 MG/DL
BILIRUB SERPL-MCNC: 0.3 MG/DL
BUN SERPL-MCNC: 17 MG/DL
C PEPTIDE SERPL-MCNC: 0.5 NG/ML
CALCIUM SERPL-MCNC: 9.3 MG/DL
CHLORIDE SERPL-SCNC: 104 MMOL/L
CHOLEST SERPL-MCNC: 189 MG/DL
CO2 SERPL-SCNC: 26 MMOL/L
CREAT SERPL-MCNC: 0.83 MG/DL
EGFR: 100 ML/MIN/1.73M2
ESTIMATED AVERAGE GLUCOSE: 192 MG/DL
GLUCOSE SERPL-MCNC: 129 MG/DL
HBA1C MFR BLD HPLC: 8.3 %
HDLC SERPL-MCNC: 49 MG/DL
LDLC SERPL CALC-MCNC: 128 MG/DL
NONHDLC SERPL-MCNC: 140 MG/DL
PHOSPHATE SERPL-MCNC: 4.7 MG/DL
POTASSIUM SERPL-SCNC: 6.5 MMOL/L
PROT SERPL-MCNC: 7.2 G/DL
SODIUM SERPL-SCNC: 140 MMOL/L
TRIGL SERPL-MCNC: 63 MG/DL
TSH SERPL-ACNC: 0.38 UIU/ML

## 2023-01-30 ENCOUNTER — RX RENEWAL (OUTPATIENT)
Age: 63
End: 2023-01-30

## 2023-02-06 ENCOUNTER — APPOINTMENT (OUTPATIENT)
Dept: ENDOCRINOLOGY | Facility: CLINIC | Age: 63
End: 2023-02-06

## 2023-02-06 VITALS
DIASTOLIC BLOOD PRESSURE: 67 MMHG | BODY MASS INDEX: 25.71 KG/M2 | RESPIRATION RATE: 99 BRPM | HEART RATE: 91 BPM | HEIGHT: 66 IN | WEIGHT: 160 LBS | SYSTOLIC BLOOD PRESSURE: 119 MMHG

## 2023-02-06 DIAGNOSIS — R79.89 OTHER SPECIFIED ABNORMAL FINDINGS OF BLOOD CHEMISTRY: ICD-10-CM

## 2023-02-06 LAB — GLUCOSE BLDC GLUCOMTR-MCNC: 119

## 2023-02-06 RX ORDER — ATORVASTATIN CALCIUM 20 MG/1
20 TABLET, FILM COATED ORAL
Refills: 0 | Status: DISCONTINUED | COMMUNITY
End: 2023-02-06

## 2023-02-06 NOTE — ASSESSMENT
[Exercise/Effect on Glucose] : exercise/effect on glucose [Ketone Testing] : ketone testing [Action and use of Insulin] : action and use of short and long-acting insulin [Self Monitoring of Blood Glucose] : self monitoring of blood glucose [Sick-Day Management] : sick-day management [0] : 2) Feeling down, depressed, or hopeless: Not at all (0) [PHQ-2 Negative - No further assessment needed] : PHQ-2 Negative - No further assessment needed [[id number]] : 0

## 2023-02-06 NOTE — HISTORY OF PRESENT ILLNESS
[FreeTextEntry1] : Prior patent of Dr Morris, seeing me for the first time\par h/io \par type 2 DM \par dxed 20 yrs back \par on degludec  10-11 units daily \par NovoLog 10-12 units with meals, takes 3 meals a day \par eye exam is due s\par has low BG very rarely \par BG log reviewed feet exam in sep 2018 - redone today \par c/o itching around his neck \par no other complains offered\par stopped statin by himself. \par Review of system \par no chest pain, no palpitations \par no Shortness of breath,no wheezing. \par no heat or cold intolerance ,no flushing \par no headache, no numbness or tingling. \par \par \par \par 01/27/2020- FOLLOW UP\par discussed labs from last time \par A1c was not at goal \par this time BG log suggest BG in target \par low BG 1-2 times a month \par did not start statin, LDL is 120 mg % \par still working night shifts\par Review of system \par no chest pain, no palpitations \par no Shortness of breath,no wheezing. \par \par \par 01/22/2021- FOLLOW UP\par Patient continues to take Tresiba 11 units and sliding scale.  He is having frequent hypoglycemia to the tune of 2 times per week.  He had recent labs with his primary care physician approximately 2 months back A1c was elevated and had suppressed TSH which was not followed up on.  He has parotid swelling which was imaged and is being followed up at this point of time.  He has started on statin which she is taking regularly.  He is also seeing his eye physician and has trouble with his left eye.\par \par \par 02/22/2021- FOLLOW UP\par Patient continues to take Tresiba 12 units and sliding scale.  He does have hypoglycemia around 12 midnight and post lunch.  I discussed the sliding scale with him and on further questioning I realized that he has been dosing himself more than his sliding scale which does make sense as he has been getting frequent hypoglycemia.  He is more compliant with his diet now.\par \par \par \par 07/26/2021- FOLLOW UP\par Patient is on Tresiba 12 to 14  units at bedtime and NovoLog scale.  His blood sugar is 156 mg percent he sometimes gets low blood sugars when he overdoses himself\par GLYCEMIC LOG WAS REVIEWED AND SCANNED IN THE SYSTEM\par \par \par 10/20/2021- FOLLOW UP\par GLYCEMIC LOG WAS REVIEWED AND SCANNED IN THE SYSTEM\par  SANTOS HALE is here to discuss test results , labs and further plan of care \par labs discussed in detail-  \par Gets low blood sugars when misses meals.\par no acute medical issues in the interim\par \par \par 02/28/2022- FOLLOW UP\par GLYCEMIC LOG WAS REVIEWED AND SCANNED IN THE SYSTEM\par Continues to have glycemic variability.  On Lantus 12 units at bedtime along 8 to 10 units before meals.  Complains of fatigue.  No new medical diagnoses as such.  Does not want to trial any new medications for diabetes.  We discussed that this can be an issue with the current regimen he is on.  Denies any significant hypoglycemic events requiring assistance of other people.  I will check his basic labs and vitamin D levels.  Will make recommendations.  Follow-up in 3 months from now.\par \par \par 06/20/2022- FOLLOW UP\par No glycemic log to review today.  Currently on Lantus 11 units at bedtime and Humalog 10 to 12 units 3 times a day.  Blood sugars average around 1 60-1 90 milligrams percent no severely frequent low blood sugars.  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  I do not think he will have good response to other diabetic medications.  Discussed the use of CGM and high risk of DKA.  But to miss his long-acting insulin.  Patient is agreeable and understands the disease information packet given to the patient.  He is not keen on using a CGM or an extensive diabetic teaching at this point.  Discussed the risk of hypoglycemia with him.  I will again screen him for any thyroid dysfunction and other labs.  All questions and concerns addressed.\par \par 10/13/2022- FOLLOW UP\par GLYCEMIC LOG WAS REVIEWED AND SCANNED IN THE SYSTEM\par Currently on Humalog 10 to 12 units 3 times a day Lantus 11 units such as.  Has low blood sugars when he forgets to eat.  Discussed the importance of regular meals.  I reviewed the A1c levels with him he refuses to use a anna.  Advised to get labs today and if A1c is still trending high will place him on anna.  Check relevant labs today.  Advised the importance of tight glycemic control to the complications regular mealtimes.  All questions and concerns addressed follow-up in 3 months from now.\par \par \par 02/06/2023- FOLLOW UP\par GLYCEMIC LOG WAS REVIEWED AND SCANNED IN THE SYSTEM \par Currently on Humalog 10 to 12 units 3 times a day Lantus 11 units. has lows late in the afternoon. \par refuses to use anna, i will check his labs today , we discussed 2 hr post meal targets, and to titrate correction doses accordingly ,\par adv to strongly consider statin use very hesitant. \par might benefit from carb counting for further fine tuning

## 2023-02-07 LAB
BUN SERPL-MCNC: 13 MG/DL
CHOLEST SERPL-MCNC: 188 MG/DL
CREAT SERPL-MCNC: 0.93 MG/DL
EGFR: 93 ML/MIN/1.73M2
ESTIMATED AVERAGE GLUCOSE: 160 MG/DL
HBA1C MFR BLD HPLC: 7.2 %
HDLC SERPL-MCNC: 49 MG/DL
LDLC SERPL CALC-MCNC: 123 MG/DL
NONHDLC SERPL-MCNC: 140 MG/DL
TRIGL SERPL-MCNC: 86 MG/DL
TSH SERPL-ACNC: 0.37 UIU/ML

## 2023-06-07 ENCOUNTER — TRANSCRIPTION ENCOUNTER (OUTPATIENT)
Age: 63
End: 2023-06-07

## 2023-08-18 NOTE — PROCEDURE
[FreeTextEntry1] : CGM report reviewed ,  interpreted and discussed with the patient.\par PIs refer to the scanned document for further details\par  Adv \par will need readjustment of insulin sliding scale doses \par risk of hypoglycemia and glycemic variability \par adv to john f/u sooner  PAST SURGICAL HISTORY:  AV fistula R arm; L arm clotted

## 2023-09-11 ENCOUNTER — APPOINTMENT (OUTPATIENT)
Dept: ENDOCRINOLOGY | Facility: CLINIC | Age: 63
End: 2023-09-11
Payer: COMMERCIAL

## 2023-09-11 VITALS
HEIGHT: 66 IN | DIASTOLIC BLOOD PRESSURE: 76 MMHG | BODY MASS INDEX: 25.39 KG/M2 | WEIGHT: 158 LBS | HEART RATE: 72 BPM | SYSTOLIC BLOOD PRESSURE: 112 MMHG | OXYGEN SATURATION: 98 %

## 2023-09-11 LAB — GLUCOSE BLDC GLUCOMTR-MCNC: 121

## 2023-09-11 PROCEDURE — 82962 GLUCOSE BLOOD TEST: CPT

## 2023-09-11 PROCEDURE — 99214 OFFICE O/P EST MOD 30 MIN: CPT | Mod: 25

## 2023-09-11 RX ORDER — INSULIN GLARGINE 100 [IU]/ML
100 INJECTION, SOLUTION SUBCUTANEOUS
Qty: 15 | Refills: 3 | Status: ACTIVE | COMMUNITY
Start: 2022-09-29 | End: 1900-01-01

## 2023-09-11 RX ORDER — INSULIN LISPRO 100 [IU]/ML
100 INJECTION, SOLUTION INTRAVENOUS; SUBCUTANEOUS
Qty: 45 | Refills: 3 | Status: ACTIVE | COMMUNITY
Start: 2021-08-06 | End: 1900-01-01

## 2023-09-18 LAB
ESTIMATED AVERAGE GLUCOSE: 174 MG/DL
HBA1C MFR BLD HPLC: 7.7 %
TSH SERPL-ACNC: 0.31 UIU/ML

## 2024-01-06 ENCOUNTER — RESULT REVIEW (OUTPATIENT)
Age: 64
End: 2024-01-06

## 2024-01-12 ENCOUNTER — APPOINTMENT (OUTPATIENT)
Dept: THORACIC SURGERY | Facility: HOSPITAL | Age: 64
End: 2024-01-12

## 2024-01-23 ENCOUNTER — TRANSCRIPTION ENCOUNTER (OUTPATIENT)
Age: 64
End: 2024-01-23

## 2024-01-23 ENCOUNTER — RESULT REVIEW (OUTPATIENT)
Age: 64
End: 2024-01-23

## 2024-02-08 ENCOUNTER — RESULT REVIEW (OUTPATIENT)
Age: 64
End: 2024-02-08

## 2024-03-04 ENCOUNTER — APPOINTMENT (OUTPATIENT)
Dept: THORACIC SURGERY | Facility: CLINIC | Age: 64
End: 2024-03-04
Payer: COMMERCIAL

## 2024-03-04 VITALS
WEIGHT: 148 LBS | HEART RATE: 88 BPM | RESPIRATION RATE: 16 BRPM | DIASTOLIC BLOOD PRESSURE: 76 MMHG | SYSTOLIC BLOOD PRESSURE: 145 MMHG | OXYGEN SATURATION: 98 % | HEIGHT: 68 IN | BODY MASS INDEX: 22.43 KG/M2

## 2024-03-04 PROCEDURE — 99212 OFFICE O/P EST SF 10 MIN: CPT

## 2024-03-04 NOTE — DISCUSSION/SUMMARY
[Doing Well] : is doing well [Excellent Pain Control] : has excellent pain control [No Sign of Infection] : is showing no signs of infection [0] : 0 [de-identified] : PEG removed [FreeTextEntry1] : PEG removed in office by Dr. Tucker without any complications. Pressure applied and gauze dressing placed over site. Patient tolerated procedure well. Patient instructed to monitor site for drainage and advised to call thoracic surgery with any questions or concerns.

## 2024-03-04 NOTE — REASON FOR VISIT
[de-identified] :  Tracheostomy insertion, percutaneous endoscopic gastrostomy [de-identified] : 1/12/2024 [de-identified] : This patient is a 64-year-old gentleman who came in to [facility] in respiratory arrest.  The patient has prolonged respiratory failure, and thoracic surgery was consulted to place a tracheostomy as well as a feeding tube. Patient now presents for a post operative appointment and removal of PEG tube.   Patient presents with is wife feeling well. Patient is tolerating his diet and has no issues with PEG currently. [Spouse] : spouse

## 2024-03-12 ENCOUNTER — TRANSCRIPTION ENCOUNTER (OUTPATIENT)
Age: 64
End: 2024-03-12

## 2024-03-12 ENCOUNTER — APPOINTMENT (OUTPATIENT)
Dept: NEUROLOGY | Facility: CLINIC | Age: 64
End: 2024-03-12
Payer: COMMERCIAL

## 2024-03-12 ENCOUNTER — NON-APPOINTMENT (OUTPATIENT)
Age: 64
End: 2024-03-12

## 2024-03-12 VITALS
HEART RATE: 83 BPM | HEIGHT: 68 IN | DIASTOLIC BLOOD PRESSURE: 75 MMHG | OXYGEN SATURATION: 98 % | BODY MASS INDEX: 23.04 KG/M2 | SYSTOLIC BLOOD PRESSURE: 131 MMHG | WEIGHT: 152 LBS

## 2024-03-12 DIAGNOSIS — H53.40 UNSPECIFIED VISUAL FIELD DEFECTS: ICD-10-CM

## 2024-03-12 PROCEDURE — 99215 OFFICE O/P EST HI 40 MIN: CPT

## 2024-03-13 ENCOUNTER — LABORATORY RESULT (OUTPATIENT)
Age: 64
End: 2024-03-13

## 2024-03-13 RX ORDER — ATORVASTATIN CALCIUM 80 MG/1
80 TABLET, FILM COATED ORAL
Refills: 0 | Status: ACTIVE | COMMUNITY

## 2024-03-13 RX ORDER — ASPIRIN 81 MG
81 TABLET, DELAYED RELEASE (ENTERIC COATED) ORAL
Refills: 0 | Status: ACTIVE | COMMUNITY

## 2024-03-13 RX ORDER — NITROGLYCERIN 0.4 MG/1
0.4 TABLET SUBLINGUAL
Refills: 0 | Status: ACTIVE | COMMUNITY

## 2024-03-13 RX ORDER — CLOPIDOGREL BISULFATE 75 MG/1
75 TABLET, FILM COATED ORAL
Refills: 0 | Status: ACTIVE | COMMUNITY

## 2024-03-13 RX ORDER — METOPROLOL TARTRATE 25 MG/1
25 TABLET, FILM COATED ORAL
Refills: 0 | Status: ACTIVE | COMMUNITY

## 2024-03-14 PROBLEM — H53.40 VISUAL FIELD DEFECT: Status: ACTIVE | Noted: 2024-03-14

## 2024-03-14 LAB
AT III PPP CHRO-ACNC: 101 %
C3 SERPL-MCNC: 128 MG/DL
C4 SERPL-MCNC: 34 MG/DL
CRP SERPL-MCNC: <3 MG/L
ERYTHROCYTE [SEDIMENTATION RATE] IN BLOOD BY WESTERGREN METHOD: 12 MM/HR
HCYS SERPL-MCNC: 8 UMOL/L
PROT C PPP CHRO-ACNC: 113 %
PROT S AG ACT/NOR PPP IA: 78 %

## 2024-03-14 NOTE — ASSESSMENT
[FreeTextEntry1] : 64 year old male with a past medical history of DM who presented to [facility] on 1/7 for cardiac and respiratory arrest. CT chest showed bibasilar consolidations with patchy opacities in the remaining aerated lungs consistent with multifocal pneumonia. During breathing trials while intubated, pt noted to have episode where head turned to left then right, reported to have had right gaze, some rigidity in extremities/arm and leg, episode was brief.  CTH with large appearing hypodensity in right PCA territory, upon review of initial CTH by neurology team not conclusive due to streak artifact, but some early ischemic changes may have been present in right PCA territory. EEG without epileptiform activity. MRI confirms right PCA infarct without structural damage to other areas including thalamus/brainstem.  Mental status likely not secondary to underlying PCA stroke and likely due to underlying metabolic/ infectious etiology.  Discharged to rehab and ultiately home.  Currently in PT/ OT twice a week.  Left visual deficit persists.    - ASA 81mg for secondary stroke prevention, currently on DAPT.  For stroke preventive purpose monotherapy with ASA81 is sufficient, recommended to follow-up with cardiology to inquire whether DAPT should be continued due to severe 3V CAD  -BP slightly elevated, goal <120/80  reports daily log at home within range, encouraged to continue daily readings - LDL is at goal 67 - continue atorvastatin 80mg  - hypercoagulable workup ordered -continue PT/ OT as scheduled - Referral for cardiology for long term heart monitor - encouraged Mediterranean style diet and exercise as tolerated  Follow up in 6 weeks

## 2024-03-14 NOTE — PHYSICAL EXAM
[FreeTextEntry1] : Mental status: Orientation: Alert , oriented to month, day of week, year, location Speech is fluent , able to name objects, repeat a sentence and write a sentence Memory: Short term tested by registering 3 objects and recalled 2/3 in 5 min, 3/3 with hints; Long term memory intact based on correct recall of past events and demographic details. Concentration: able to do serial 7 calculation 2/5 and spell world backwards Comprehension : able follow 3 step requests Apraxia and visuospatial able to draw clock drawing and intersecting pentagon Neglect is absent Agnosia is absent  MMSE 29/30 Cranial nerves: CN I deferred. CN II left visual field deficit; ; III, IV, VI: PERRLA, left sided exotropia  IV: Facial sensation normal B/L to touch, pinprick and temperature VII: Facial strength normal B/L. VIII: Gross hearing intact IX, X: palate midline and elevates symmetrically XI: Trapezius normal strength, XII: Tongue midline without atrophy or fasciculation Motor: Muscle tone no rigidity or resistance in all 4 extremities. No atrophy or fasciculation. Muscle strength: arms and legs, proximal and distal flexors and extensors are normal 5/5. No UE drift. Sensory: intact to pinprick, temperature sensation to vibration and cold.  Reflexes: all present, normal, and symmetrical 2+  Coordination: finger to nose: normal. Heel to shin: normal Gait: steady normal based ; Romberg test negative

## 2024-03-14 NOTE — HISTORY OF PRESENT ILLNESS
[FreeTextEntry1] : Charles Steinberg is a 64 year old male with a past medical history of DM here for hospital follow-up. Accompanied with wife. Presented to [facility] on 1/7/24 for cardiac and respiratory arrest. Family reported at the time pt was coughing for about 1 week after returning from [place].  Developed SOB, difficulty breathing initially prescribed amoxicillin which he felt better and then got worse.  EMS called for worsening respiratory decompensation, on scene pt was in respiratory arrest and became unresponsive.  CT chest showed bibasilar consolidations with patchy opacities and a large pleural effusion. Neurology consulted due to while attempting breathing trials pt had one episode where head turned to left then right with right gaze with some rigidity in extremities.  CTH showed large appearing hypodensity in right PCA territory, upon review of initial CTH by neurology team not conclusive due to streak artifact, but some early ischemic changes may have been present in right PCA territory.  EEG without epileptiform activity.  MRI confirms right PCA infarct without structural damage to other areas including thalamus/brainstem.  Discharged to rehab and ultimately home currently living with family.   Today reports feeling well, does not require assistive devices such as cane or walker.  Currently working with PT/ OT twice a week.  Left visual field deficit persists, reports previous to event always have minor difficulties with left eye s/p cataract surgery however now substantially worsened since stroke.  Blurry vision to R eye as well. Following retinal specialist MD Ramon Mai.  Currently not driving. Taking BP at home repots systolic ranging 100-110's/ 70's, did not bring log to review for this visit. Following well balanced healthy diet with lean proteins, vegetables, whole grains.  No smoking or alcohol use.   Exercising with 30 minutes of walking daily. Sleeping well, no snoring, headaches, dry mouth, or fatigue. Compliant with medication, continues to be on DAPT.  Needs to schedule follow-up with cardiology. Denies headaches, weakness, numbness, gait instability, or dizziness.

## 2024-03-14 NOTE — DATA REVIEWED
[de-identified] : 1/8/24: CTH: FINDINGS: Large area transcortical hypodensity in the medial right temporal occipital lobes concerning for acute ischemic infarction in the right PCA territory. No hemorrhagic transformation. The prior study was degraded by motion artifact in the region of this current infarct. There is local mass effect without midline shift. No hydrocephalus or extra-axial collections. No midline shift. Hyperdense left globe. Mucosal thickening in the paranasal sinuses with air-fluid levels. The mastoid air cells are clear. The visualized soft tissues and osseous structures appear normal. IMPRESSION:  Acute ischemic infarction of the right PCA territory without hemorrhagic transformation.   1/7/24: CTH: FINDINGS:   Brain parenchyma: Markedly degraded by streak artifact. To the visualized extent, gray-white matter discrimination is preserved. There is no mass effect or intracranial hemorrhage. Extra-axial compartments: No extra-axial fluid collections are present. The ventricles are slightly enlarged compatible with volume loss.. The cortical sulci are preserved. The basal cisterns are patent. Craniocervical junction and sella turcica are within normal limits. Calvarium, paranasal sinuses, and orbits: The calvarium is intact. Paranasal sinuses demonstrate mucosal thickening. The mastoid air cells are clear. The orbits are within normal limits. IMPRESSION: Markedly limited exam due to significant streak artifact. To the visualized extent, no intracranial hemorrhage or mass effect. MRI brain could be considered for further evaluation of occult ischemic changes if symptoms persist.  [de-identified] : 1/9/24- MRI brain/ MRA head and neck There is evidence of abnormal T2 prolongation with restricted diffusion seen involving the right temporal medial occipital/parietal region. This is compatible with an acute right PCA infarct. No hemorrhagic transformation is seen. There is localized mass effect seen consisting of sulcal effacement  The large vessels demonstrate normal flow voids  Air-fluid level seen involving right maxillary sinus. Bilateral ethmoid, bilateral sphenoid and bilateral maxillary sinus mucosal thickening is seen. Air-fluid level is seen involving the right sphenoid sinus.  Air-fluid level mucosal thickening seen involving the frontal sinuses. Mild inflammatory changes are seen involving both mastoid regions left greater than right.  Prominent fluid is seen around both optic nerves. Please correlate clinically.  There is evidence of abnormal signal seen involving the left globe which is likely compatible with silicone implant.  Both distal internal carotid arteries appear normal. Hypoplastic left A1 segment is seen. Both anterior cerebral middle cerebral basilar and posterior cerebral arteries appear normal without evidence of an aneurysm or significant stenosis.   IMPRESSION: Acute PCA infarct seen on the right side.  Prominent fluid is seen around both optic nerves as described above.  Hypoplastic left A1 segment the rest of the [place] appears unremarkable.

## 2024-03-16 LAB
APCR PPP: 2.92 RATIO
APO LP(A) SERPL-MCNC: 24.6 NMOL/L
B2 GLYCOPROT1 IGA SERPL IA-ACNC: <5 SAU
B2 GLYCOPROT1 IGG SER-ACNC: <5 SGU
B2 GLYCOPROT1 IGM SER-ACNC: <5 SMU
CARDIOLIPIN AB SER IA-ACNC: NEGATIVE
FVL BLANK: 36.6
FVL TEST: 106.8
PROT S FREE PPP-ACNC: 100 %

## 2024-03-19 LAB
ANA SER IF-ACNC: NEGATIVE
CARDIOLIPIN IGM SER-MCNC: <5 GPL
CARDIOLIPIN IGM SER-MCNC: <5 MPL
DNA PLOIDY SPEC FC-IMP: NORMAL

## 2024-04-30 ENCOUNTER — APPOINTMENT (OUTPATIENT)
Dept: NEUROLOGY | Facility: CLINIC | Age: 64
End: 2024-04-30

## 2024-05-31 ENCOUNTER — RESULT REVIEW (OUTPATIENT)
Age: 64
End: 2024-05-31

## 2024-06-03 ENCOUNTER — TRANSCRIPTION ENCOUNTER (OUTPATIENT)
Age: 64
End: 2024-06-03

## 2024-06-04 ENCOUNTER — INPATIENT (INPATIENT)
Facility: HOSPITAL | Age: 64
LOS: 1 days | Discharge: HOME | DRG: 303 | End: 2024-06-06
Attending: THORACIC SURGERY (CARDIOTHORACIC VASCULAR SURGERY) | Admitting: THORACIC SURGERY (CARDIOTHORACIC VASCULAR SURGERY)
Payer: COMMERCIAL

## 2024-06-04 ENCOUNTER — TRANSCRIPTION ENCOUNTER (OUTPATIENT)
Age: 64
End: 2024-06-04

## 2024-06-04 VITALS — TEMPERATURE: 98 F

## 2024-06-04 DIAGNOSIS — Z93.1 GASTROSTOMY STATUS: Chronic | ICD-10-CM

## 2024-06-04 LAB
A1C WITH ESTIMATED AVERAGE GLUCOSE RESULT: 6.5 % — HIGH (ref 4–5.6)
ALBUMIN SERPL ELPH-MCNC: 3.5 G/DL — SIGNIFICANT CHANGE UP (ref 3.3–5)
ALP SERPL-CCNC: 100 U/L — SIGNIFICANT CHANGE UP (ref 40–120)
ALT FLD-CCNC: 23 U/L — SIGNIFICANT CHANGE UP (ref 10–45)
ANION GAP SERPL CALC-SCNC: 13 MMOL/L — SIGNIFICANT CHANGE UP (ref 5–17)
APPEARANCE UR: CLEAR — SIGNIFICANT CHANGE UP
APTT BLD: 28.4 SEC — SIGNIFICANT CHANGE UP (ref 24.5–35.6)
AST SERPL-CCNC: 20 U/L — SIGNIFICANT CHANGE UP (ref 10–40)
BASOPHILS # BLD AUTO: 0.04 K/UL — SIGNIFICANT CHANGE UP (ref 0–0.2)
BASOPHILS NFR BLD AUTO: 0.8 % — SIGNIFICANT CHANGE UP (ref 0–2)
BILIRUB SERPL-MCNC: 0.5 MG/DL — SIGNIFICANT CHANGE UP (ref 0.2–1.2)
BILIRUB UR-MCNC: NEGATIVE — SIGNIFICANT CHANGE UP
BLD GP AB SCN SERPL QL: NEGATIVE — SIGNIFICANT CHANGE UP
BUN SERPL-MCNC: 17 MG/DL — SIGNIFICANT CHANGE UP (ref 7–23)
CALCIUM SERPL-MCNC: 8.6 MG/DL — SIGNIFICANT CHANGE UP (ref 8.4–10.5)
CHLORIDE SERPL-SCNC: 100 MMOL/L — SIGNIFICANT CHANGE UP (ref 96–108)
CHOLEST SERPL-MCNC: 98 MG/DL — SIGNIFICANT CHANGE UP
CK MB CFR SERPL CALC: 1.4 NG/ML — SIGNIFICANT CHANGE UP (ref 0–6.7)
CK SERPL-CCNC: 85 U/L — SIGNIFICANT CHANGE UP (ref 30–200)
CO2 SERPL-SCNC: 23 MMOL/L — SIGNIFICANT CHANGE UP (ref 22–31)
COLOR SPEC: YELLOW — SIGNIFICANT CHANGE UP
CREAT SERPL-MCNC: 0.83 MG/DL — SIGNIFICANT CHANGE UP (ref 0.5–1.3)
DIFF PNL FLD: NEGATIVE — SIGNIFICANT CHANGE UP
EGFR: 99 ML/MIN/1.73M2 — SIGNIFICANT CHANGE UP
EOSINOPHIL # BLD AUTO: 0.38 K/UL — SIGNIFICANT CHANGE UP (ref 0–0.5)
EOSINOPHIL NFR BLD AUTO: 8.1 % — HIGH (ref 0–6)
ESTIMATED AVERAGE GLUCOSE: 140 MG/DL — HIGH (ref 68–114)
GLUCOSE BLDC GLUCOMTR-MCNC: 193 MG/DL — HIGH (ref 70–99)
GLUCOSE BLDC GLUCOMTR-MCNC: 272 MG/DL — HIGH (ref 70–99)
GLUCOSE SERPL-MCNC: 287 MG/DL — HIGH (ref 70–99)
GLUCOSE UR QL: >=1000 MG/DL
HCT VFR BLD CALC: 40.6 % — SIGNIFICANT CHANGE UP (ref 39–50)
HDLC SERPL-MCNC: 31 MG/DL — LOW
HGB BLD-MCNC: 13.1 G/DL — SIGNIFICANT CHANGE UP (ref 13–17)
IMM GRANULOCYTES NFR BLD AUTO: 0 % — SIGNIFICANT CHANGE UP (ref 0–0.9)
INR BLD: 1.11 — SIGNIFICANT CHANGE UP (ref 0.85–1.18)
KETONES UR-MCNC: ABNORMAL MG/DL
LEUKOCYTE ESTERASE UR-ACNC: NEGATIVE — SIGNIFICANT CHANGE UP
LIPID PNL WITH DIRECT LDL SERPL: 47 MG/DL — SIGNIFICANT CHANGE UP
LYMPHOCYTES # BLD AUTO: 1.09 K/UL — SIGNIFICANT CHANGE UP (ref 1–3.3)
LYMPHOCYTES # BLD AUTO: 23.1 % — SIGNIFICANT CHANGE UP (ref 13–44)
MAGNESIUM SERPL-MCNC: 1.7 MG/DL — SIGNIFICANT CHANGE UP (ref 1.6–2.6)
MCHC RBC-ENTMCNC: 25.4 PG — LOW (ref 27–34)
MCHC RBC-ENTMCNC: 32.3 GM/DL — SIGNIFICANT CHANGE UP (ref 32–36)
MCV RBC AUTO: 78.7 FL — LOW (ref 80–100)
MONOCYTES # BLD AUTO: 0.62 K/UL — SIGNIFICANT CHANGE UP (ref 0–0.9)
MONOCYTES NFR BLD AUTO: 13.2 % — SIGNIFICANT CHANGE UP (ref 2–14)
NEUTROPHILS # BLD AUTO: 2.58 K/UL — SIGNIFICANT CHANGE UP (ref 1.8–7.4)
NEUTROPHILS NFR BLD AUTO: 54.8 % — SIGNIFICANT CHANGE UP (ref 43–77)
NITRITE UR-MCNC: NEGATIVE — SIGNIFICANT CHANGE UP
NON HDL CHOLESTEROL: 67 MG/DL — SIGNIFICANT CHANGE UP
NRBC # BLD: 0 /100 WBCS — SIGNIFICANT CHANGE UP (ref 0–0)
NT-PROBNP SERPL-SCNC: 3762 PG/ML — HIGH (ref 0–300)
PA ADP PRP-ACNC: 171 PRU — LOW (ref 194–418)
PH UR: 6 — SIGNIFICANT CHANGE UP (ref 5–8)
PHOSPHATE SERPL-MCNC: 2.5 MG/DL — SIGNIFICANT CHANGE UP (ref 2.5–4.5)
PLATELET # BLD AUTO: 250 K/UL — SIGNIFICANT CHANGE UP (ref 150–400)
POTASSIUM SERPL-MCNC: 3.8 MMOL/L — SIGNIFICANT CHANGE UP (ref 3.5–5.3)
POTASSIUM SERPL-SCNC: 3.8 MMOL/L — SIGNIFICANT CHANGE UP (ref 3.5–5.3)
PROT SERPL-MCNC: 6.1 G/DL — SIGNIFICANT CHANGE UP (ref 6–8.3)
PROT UR-MCNC: SIGNIFICANT CHANGE UP MG/DL
PROTHROM AB SERPL-ACNC: 12.6 SEC — SIGNIFICANT CHANGE UP (ref 9.5–13)
RBC # BLD: 5.16 M/UL — SIGNIFICANT CHANGE UP (ref 4.2–5.8)
RBC # FLD: 14.2 % — SIGNIFICANT CHANGE UP (ref 10.3–14.5)
RH IG SCN BLD-IMP: POSITIVE — SIGNIFICANT CHANGE UP
SODIUM SERPL-SCNC: 136 MMOL/L — SIGNIFICANT CHANGE UP (ref 135–145)
SP GR SPEC: 1.02 — SIGNIFICANT CHANGE UP (ref 1–1.03)
TRIGL SERPL-MCNC: 104 MG/DL — SIGNIFICANT CHANGE UP
TROPONIN T, HIGH SENSITIVITY RESULT: 30 NG/L — SIGNIFICANT CHANGE UP (ref 0–51)
TSH SERPL-MCNC: 0.55 UIU/ML — SIGNIFICANT CHANGE UP (ref 0.27–4.2)
UROBILINOGEN FLD QL: 1 MG/DL — SIGNIFICANT CHANGE UP (ref 0.2–1)
WBC # BLD: 4.71 K/UL — SIGNIFICANT CHANGE UP (ref 3.8–10.5)
WBC # FLD AUTO: 4.71 K/UL — SIGNIFICANT CHANGE UP (ref 3.8–10.5)

## 2024-06-04 PROCEDURE — 71046 X-RAY EXAM CHEST 2 VIEWS: CPT | Mod: 26

## 2024-06-04 PROCEDURE — 70450 CT HEAD/BRAIN W/O DYE: CPT | Mod: 26

## 2024-06-04 PROCEDURE — 93880 EXTRACRANIAL BILAT STUDY: CPT | Mod: 26

## 2024-06-04 PROCEDURE — 93010 ELECTROCARDIOGRAM REPORT: CPT | Mod: 77

## 2024-06-04 RX ORDER — DEXTROSE 10 % IN WATER 10 %
125 INTRAVENOUS SOLUTION INTRAVENOUS ONCE
Refills: 0 | Status: DISCONTINUED | OUTPATIENT
Start: 2024-06-04 | End: 2024-06-06

## 2024-06-04 RX ORDER — SENNA PLUS 8.6 MG/1
2 TABLET ORAL AT BEDTIME
Refills: 0 | Status: DISCONTINUED | OUTPATIENT
Start: 2024-06-04 | End: 2024-06-06

## 2024-06-04 RX ORDER — INSULIN GLARGINE 100 [IU]/ML
12 INJECTION, SOLUTION SUBCUTANEOUS AT BEDTIME
Refills: 0 | Status: DISCONTINUED | OUTPATIENT
Start: 2024-06-04 | End: 2024-06-06

## 2024-06-04 RX ORDER — MAGNESIUM OXIDE 400 MG ORAL TABLET 241.3 MG
800 TABLET ORAL ONCE
Refills: 0 | Status: COMPLETED | OUTPATIENT
Start: 2024-06-04 | End: 2024-06-04

## 2024-06-04 RX ORDER — POTASSIUM CHLORIDE 20 MEQ
20 PACKET (EA) ORAL ONCE
Refills: 0 | Status: COMPLETED | OUTPATIENT
Start: 2024-06-04 | End: 2024-06-04

## 2024-06-04 RX ORDER — DEXTROSE 50 % IN WATER 50 %
15 SYRINGE (ML) INTRAVENOUS ONCE
Refills: 0 | Status: DISCONTINUED | OUTPATIENT
Start: 2024-06-04 | End: 2024-06-06

## 2024-06-04 RX ORDER — GLUCAGON INJECTION, SOLUTION 0.5 MG/.1ML
1 INJECTION, SOLUTION SUBCUTANEOUS ONCE
Refills: 0 | Status: DISCONTINUED | OUTPATIENT
Start: 2024-06-04 | End: 2024-06-06

## 2024-06-04 RX ORDER — ISOSORBIDE MONONITRATE 60 MG/1
30 TABLET, EXTENDED RELEASE ORAL DAILY
Refills: 0 | Status: DISCONTINUED | OUTPATIENT
Start: 2024-06-04 | End: 2024-06-06

## 2024-06-04 RX ORDER — ATORVASTATIN CALCIUM 80 MG/1
80 TABLET, FILM COATED ORAL AT BEDTIME
Refills: 0 | Status: DISCONTINUED | OUTPATIENT
Start: 2024-06-04 | End: 2024-06-04

## 2024-06-04 RX ORDER — METOPROLOL TARTRATE 50 MG
12.5 TABLET ORAL EVERY 12 HOURS
Refills: 0 | Status: DISCONTINUED | OUTPATIENT
Start: 2024-06-04 | End: 2024-06-06

## 2024-06-04 RX ORDER — SODIUM CHLORIDE 9 MG/ML
3 INJECTION INTRAMUSCULAR; INTRAVENOUS; SUBCUTANEOUS EVERY 8 HOURS
Refills: 0 | Status: DISCONTINUED | OUTPATIENT
Start: 2024-06-04 | End: 2024-06-06

## 2024-06-04 RX ORDER — SODIUM CHLORIDE 9 MG/ML
1000 INJECTION, SOLUTION INTRAVENOUS
Refills: 0 | Status: DISCONTINUED | OUTPATIENT
Start: 2024-06-04 | End: 2024-06-06

## 2024-06-04 RX ORDER — POLYETHYLENE GLYCOL 3350 17 G/17G
17 POWDER, FOR SOLUTION ORAL DAILY
Refills: 0 | Status: DISCONTINUED | OUTPATIENT
Start: 2024-06-04 | End: 2024-06-06

## 2024-06-04 RX ORDER — NITROGLYCERIN 6.5 MG
0.4 CAPSULE, EXTENDED RELEASE ORAL
Refills: 0 | Status: DISCONTINUED | OUTPATIENT
Start: 2024-06-04 | End: 2024-06-06

## 2024-06-04 RX ORDER — NITROGLYCERIN 6.5 MG
1 CAPSULE, EXTENDED RELEASE ORAL
Refills: 0 | DISCHARGE

## 2024-06-04 RX ORDER — DEXTROSE 50 % IN WATER 50 %
12.5 SYRINGE (ML) INTRAVENOUS ONCE
Refills: 0 | Status: DISCONTINUED | OUTPATIENT
Start: 2024-06-04 | End: 2024-06-06

## 2024-06-04 RX ORDER — ATORVASTATIN CALCIUM 80 MG/1
80 TABLET, FILM COATED ORAL AT BEDTIME
Refills: 0 | Status: DISCONTINUED | OUTPATIENT
Start: 2024-06-04 | End: 2024-06-06

## 2024-06-04 RX ORDER — INSULIN LISPRO 100/ML
5 VIAL (ML) SUBCUTANEOUS
Refills: 0 | Status: DISCONTINUED | OUTPATIENT
Start: 2024-06-04 | End: 2024-06-06

## 2024-06-04 RX ORDER — INSULIN LISPRO 100/ML
VIAL (ML) SUBCUTANEOUS
Refills: 0 | Status: DISCONTINUED | OUTPATIENT
Start: 2024-06-04 | End: 2024-06-06

## 2024-06-04 RX ORDER — ASPIRIN/CALCIUM CARB/MAGNESIUM 324 MG
81 TABLET ORAL DAILY
Refills: 0 | Status: DISCONTINUED | OUTPATIENT
Start: 2024-06-04 | End: 2024-06-06

## 2024-06-04 RX ORDER — INSULIN GLARGINE 100 [IU]/ML
12 INJECTION, SOLUTION SUBCUTANEOUS
Refills: 0 | DISCHARGE

## 2024-06-04 RX ORDER — PANTOPRAZOLE SODIUM 20 MG/1
40 TABLET, DELAYED RELEASE ORAL
Refills: 0 | Status: DISCONTINUED | OUTPATIENT
Start: 2024-06-04 | End: 2024-06-06

## 2024-06-04 RX ORDER — CLOPIDOGREL BISULFATE 75 MG/1
75 TABLET, FILM COATED ORAL DAILY
Refills: 0 | Status: DISCONTINUED | OUTPATIENT
Start: 2024-06-04 | End: 2024-06-04

## 2024-06-04 RX ORDER — DEXTROSE 50 % IN WATER 50 %
25 SYRINGE (ML) INTRAVENOUS ONCE
Refills: 0 | Status: DISCONTINUED | OUTPATIENT
Start: 2024-06-04 | End: 2024-06-06

## 2024-06-04 RX ORDER — HEPARIN SODIUM 5000 [USP'U]/ML
5000 INJECTION INTRAVENOUS; SUBCUTANEOUS EVERY 8 HOURS
Refills: 0 | Status: DISCONTINUED | OUTPATIENT
Start: 2024-06-04 | End: 2024-06-06

## 2024-06-04 RX ADMIN — ISOSORBIDE MONONITRATE 30 MILLIGRAM(S): 60 TABLET, EXTENDED RELEASE ORAL at 21:26

## 2024-06-04 RX ADMIN — SODIUM CHLORIDE 3 MILLILITER(S): 9 INJECTION INTRAMUSCULAR; INTRAVENOUS; SUBCUTANEOUS at 21:41

## 2024-06-04 RX ADMIN — Medication 2: at 22:18

## 2024-06-04 RX ADMIN — INSULIN GLARGINE 12 UNIT(S): 100 INJECTION, SOLUTION SUBCUTANEOUS at 22:18

## 2024-06-04 RX ADMIN — HEPARIN SODIUM 5000 UNIT(S): 5000 INJECTION INTRAVENOUS; SUBCUTANEOUS at 21:27

## 2024-06-04 RX ADMIN — ATORVASTATIN CALCIUM 80 MILLIGRAM(S): 80 TABLET, FILM COATED ORAL at 21:27

## 2024-06-04 RX ADMIN — MAGNESIUM OXIDE 400 MG ORAL TABLET 800 MILLIGRAM(S): 241.3 TABLET ORAL at 21:26

## 2024-06-04 RX ADMIN — Medication 20 MILLIEQUIVALENT(S): at 21:26

## 2024-06-04 RX ADMIN — Medication 12.5 MILLIGRAM(S): at 19:13

## 2024-06-04 RX ADMIN — Medication 5 UNIT(S): at 18:58

## 2024-06-04 RX ADMIN — Medication 81 MILLIGRAM(S): at 21:27

## 2024-06-04 NOTE — H&P ADULT - NSHPPHYSICALEXAM_GEN_ALL_CORE
General: Sitting in bed comfortably in NAD  Neuro: A&Ox3, no focal deficits   HEENT: NCAT, EOMI. No carotid bruit or JVD  Cardiac: Regular rate and rhythm, normal S1 and S2. No m/r/g   Pulm: Breathing comfortably on RA. No signs of respiratory distress. Lungs are CTA b/l without wheezes, rales, or rhonchi   Abdomen: Soft, non-distended, non-tender. + bowel sounds   : No rick  Extremities: Warm and well perfused, no peripheral edema, PT  2+. No calf tenderness. SCDs and TEDs in place  MSK: Full AROM   Wound: No prior surgical scars appreciated over chest, abdomen, or groin.

## 2024-06-04 NOTE — PATIENT PROFILE ADULT - MEDICATIONS/VISITS
Problem: Falls - Risk of  Goal: *Absence of Falls  Description: Document Nakia Ashley Fall Risk and appropriate interventions in the flowsheet. Outcome: Progressing Towards Goal  Note: Fall Risk Interventions:  Mobility Interventions: Patient to call before getting OOB         Medication Interventions: Patient to call before getting OOB    Elimination Interventions: Patient to call for help with toileting needs              Problem: Pain  Goal: *Control of Pain  Outcome: Progressing Towards Goal     Problem: Breathing Pattern - Ineffective  Goal: Ability to achieve and maintain a regular respiratory rate  Outcome: Progressing Towards Goal     Problem:  Body Temperature -  Risk of, Imbalanced  Goal: Ability to maintain a body temperature within defined limits  Outcome: Progressing Towards Goal     Problem: Isolation Precautions - Risk of Spread of Infection  Goal: Prevent transmission of infectious organism to others  Outcome: Progressing Towards Goal     Problem: Nutrition Deficits  Goal: Optimize nutrtional status  Outcome: Progressing Towards Goal     Problem: Loneliness or Risk for Loneliness  Goal: Demonstrate positive use of time alone when socialization is not possible  Outcome: Progressing Towards Goal     Problem: Fatigue  Goal: Verbalize increase energy and improved vitality  Outcome: Progressing Towards Goal no

## 2024-06-04 NOTE — H&P ADULT - ASSESSMENT
This is a 64 YOM w/ PMHx IDDM2, h/o right PCA stroke, reviously hospitalized 1/2024 for cardiac and respiratory arrest, s/p ROSC, CHF with EF estimated at 25% with global left ventricular hypokinesis who presenting for orthopnea x2 days at [place]. Pt reports he's been having difficulty falling sleepand feeling SOB and pressure-like sensation on chest when supine in bed x 2days. While at [place], after admissions, BNP noted to be 5272, cardiology was consulted, EF on echo showed depressed EF worsened from 25% from 30%. cardiology felt that he would benefit for a cardiac catheterization. He was transferred from [place] to [facility] for cardiac catheterization on exam, the patient was states he feels much better now. Brother at bedside, who is a physician also providing additional history. Patient denies any orthopnea, dyspnea exertion now. Brother at bedside states that he had left pleural effusion and now with diuresis, has cleared up significantly. Patient underwent LHC which showed 3v CAD.  On arrival, patient denies chest pain, shortness of breath, nausea, vomiting.   Of note, patient was febrile to 101.4F at [facility] on 6/2/24, blood cultures, respiratory panel negative to date. Patient is a 64 year old male with PMHX of DM2, R PCA Stroke (1/2024, no deficits), HFrEF, and recent hospitalization for cardiac/respiratory arrest s/p ROSC (1/2024) who presented to [facility] on 5/31 with orthopnea x 2 days. He reports he has been having sob and pressure like sensation on chest when supine x 2 days. He was admitted to [place] for suspected acute CHF exacerbation as BNP was elevated to 5272. HE recieved IV diuresis and cardiology was consulted and a new echocardiogram was obtained which showed EF had decreased from 30% (1/2024) to 25%. Cardiology believed he would benefit from cardiac cath so he was transferred [facility] for cath. At [facility], patient was noted to be febrile to 101, RVP and blood cultures negative. He also had mild transaminitis but RUQ showed cholelithiasis without wall thickening. He underwent cardiac cath which showed 3vCAD and is transferred to [facility] on 6/4 under Dr. Ardon for CABG work-up    Neuro:   Hx of R PCA Stroke   - no deficits reports   - f/u repeat CTH   - neuro consult in AM for pre-op clearance   - will continue ASA 81 mg   - holding Plavix given perioperative status   No active pain  No delirium, no focal deficits     Cardiac:   Pre-op for CABG   - f/u PST imaging (echo, carotids, CTH, PA/Lat, PFTs)   - f/u pre-op labs   - continue lipitor 80 mg, ASA 81 mg, and metoprolol 12.5 mg   - Imdur 30 mg for coronary dilation, uptitrate if needed  - trending P2Y12 as patient takes Plavix at home   - OR date pending   HFrEF  - EF found to be reduced to 25%, required IV diuresis at [place]   - HF consult in AM for pre-op optimization and post-op GDMT   - f/u echo   - holding lasix 20 mg   HTN   - holding home valsartan 40 mg   - continue metoprolol 12.5 mg   - starting imdur 30 mg given 3vCAD  HLD  - continue high dose statin  CAD  - continue ASA 81 mg   - hold plavix pre-op  Vital signs stable on arrival   - HR: 90s  - BP: 140/69    Pulm:   Saturating well on room air   Encouraging IS   CXR: f/u     GI:   Tolerating diet well   GI PPx: Protonix  Continue with bowel regimen     Renal:   Monitor I/Os   F/u BMP    Heme:   F/u CBC  DVT prophylaxis: SCDs and SQH 5000U    ID:   Fever of 101 @ [facility]   - BCx and RVP negative   - afebrile on arrival   - f/u WBC count and repeat BCX  Monitor fever curve     MSK:   Encourage ambulation   PT/OT     Endocrine:   Hx of DM2  - A1C: pending   - home regimen Lantus 12 U in AM and Lispro with meals   - continue ISS, lantus 12U, and lispro while in house  No Hx of thyroid disease   - TSH: pending   Monitor blood glucose levels     Dispo:  OR Date Pending

## 2024-06-04 NOTE — H&P ADULT - NSHPREVIEWOFSYSTEMS_GEN_ALL_CORE
Review of Systems  CONSTITUTIONAL:  Denies Fevers / chills, sweats, fatigue, weight loss, weight gain                                      NEURO:  Denies parethesias, seizures, syncope, confusion                                                                                EYES:  Denies Blurry vision, discharge, pain, loss of vision                                                                                    ENMT:  Denies Difficulty hearing, vertigo, dysphagia, epistaxis, recent dental work                                       CV:  Denies Chest pain, palpitations, PRECIADO, orthopnea                                                                                          RESPIRATORY:  Denies Wheezing, SOB, cough / sputum, hemoptysis                                                                GI:  Denies Nausea, vomiting, diarrhea, constipation, melena, difficulty swallowing                                               : Denies Hematuria, dysuria, urgency, incontinence                                                                                         MUSCULOSKELETAL:  Denies arthritis, joint swelling, muscle weakness                                                             SKIN/BREAST:  Denies rash, itching, hair loss, masses                                                                                            PSYCH:  Denies depression, anxiety, suicidal ideation                                                                                               HEME/LYMPH:  Denies bruises easily, enlarged lymph nodes, tender lymph nodes                                        ENDOCRINE:  Denies cold intolerance, heat intolerance, polydipsia

## 2024-06-04 NOTE — H&P ADULT - HISTORY OF PRESENT ILLNESS
This is a 64 YOM w/ PMHx IDDM2, h/o right PCA stroke, reviously hospitalized 1/2024 for cardiac and respiratory arrest, s/p ROSC, CHF with EF estimated at 25% with global left ventricular hypokinesis who presenting for orthopnea x2 days at [place]. Pt reports he's been having difficulty falling sleepand feeling SOB and pressure-like sensation on chest when supine in bed x 2days. While at [place], after admissions, BNP noted to be 5272, cardiology was consulted, EF on echo showed depressed EF worsened from 25% from 30%. cardiology felt that he would benefit for a cardiac catheterization. He was transferred from [place] to [facility] for cardiac catheterization on exam, the patient was states he feels much better now. Brother at bedside, who is a physician also providing additional history. Patient denies any orthopnea, dyspnea exertion now. Brother at bedside states that he had left pleural effusion and now with diuresis, has cleared up significantly. Patient underwent LHC which showed 3v CAD.  On arrival, patient denies chest pain, shortness of breath, nausea, vomiting.   Of note, patient was febrile to 101.4F at [facility] on 6/2/24, blood cultures, respiratory panel negative to date. Patient is a 64 year old male with PMHX of DM2, R PCA Stroke (1/2024, no deficits), HFrEF, and recent hospitalization for cardiac/respiratory arrest s/p ROSC (1/2024) who presented to [facility] on 5/31 with orthopnea x 2 days. He reports he has been having sob and pressure like sensation on chest when supine x 2 days. He was admitted to [place] for suspected acute CHF exacerbation as BNP was elevated to 5272. HE recieved IV diuresis and cardiology was consulted and a new echocardiogram was obtained which showed EF had decreased from 30% (1/2024) to 25%. Cardiology believed he would benefit from cardiac cath so he was transferred [facility] for cath. At [facility], patient was noted to be febrile to 101, RVP and blood cultures negative. He also had mild transaminitis but RUQ showed cholelithiasis without wall thickening. He underwent cardiac cath which showed 3vCAD and is transferred to [facility] on 6/4 under Dr. Ardon for CABG work-up    At time of evaluation, he reports his sob is much improved compared to [place]. No active chest pain or shortness of breath.     Denies any history of surgery on his chest or legs. Reports no history of MI or prior stents. No Hx of cancer.

## 2024-06-04 NOTE — H&P ADULT - NSHPLABSRESULTS_GEN_ALL_CORE
Right dominant system     LVEDP: 28mmHg     moderate calcification   LM: 70% ostial stenosis. 80% distal stenosis   LAD: prox: moderate diffuse disease. mid: severe diffuse disease. distal: 70% stenosis. D1: 90%   ostial stenosis, medium size. D2: mild diffuse disease, medium size   LCX: 95% ostial stenosis. Distal: 80% stenosis. OM1: mod/severe diffuse disease   Ramus: 90% stenosis   RCA: Mid RCA  with bridging collaterals. RPDA: mod diffuse disease, not well seen. RPL: large   size, mild diffuse disease       RA: 5/3/2 mmHg   RV: 59/-3/9 mmHg   PA: 59/22/35 mmHg   PCWP: 21/17/17 mmHg     CI: 2.8     ECHO:    1. Left ventricular cavity is moderately dilated. Left ventricular systolic function is severely decreased with an ejection fraction visually estimated at 25 %. Global left ventricular hypokinesis.  2. There is severe (grade 3) left ventricular diastolic dysfunction, with elevated filling pressure.  3. Based on visual assessment, the right ventricle appears mildly enlarged. borderline reduced systolic function.  4. The left atrium is moderately dilated.  5. The right atrium is mildly dilated.  6. Moderate mitral regurgitation.  7. Mild aortic regurgitation.  8. Moderate tricuspid regurgitation.  9. Estimated pulmonary artery systolic pressure is 66 mmHg, consistent with severe pulmonary hypertension.  10. Left pleural effusion noted.  11. Technically difficult image quality. Compared to prior study 1/23/2024 LVEF has mildly worsened, RV findings are new, pulmonary pressures are significantly higher (previously 33mmHg), and left pleural effusion is new.

## 2024-06-04 NOTE — H&P ADULT - NSICDXPASTMEDICALHX_GEN_ALL_CORE_FT
PAST MEDICAL HISTORY:  Cardiac arrest     DM (diabetes mellitus)     HLD (hyperlipidemia)     HTN (hypertension)

## 2024-06-04 NOTE — PATIENT PROFILE ADULT - NSPRONUTRITIONRISK_GEN_A_NUR
History  Chief Complaint   Patient presents with   • Fever     Patients mom states the patient started with a fever this morning. Tylenol given 1 hour ago. Patient is a 5year-old male who presents to the ER with his mother for fever. Patient's mom reports that his symptoms started this a.m. She reports noting Tmax of 101.4. Associated symptoms include runny nose, cough. Patient goes to  daily. Mom denies loss of appetite, change in activity, abdominal pain, vomiting, diarrhea, ear discharge, rash, recent travel. Mom reports that he was recently diagnosed with hand-foot-and-mouth disease few weeks ago. Mom also reports that patient got tubes placed in both ears recently due to recurrent ear infections. Prior to Admission Medications   Prescriptions Last Dose Informant Patient Reported? Taking? Multiple Vitamin (multivitamin) tablet   Yes No   Sig: Take 1 tablet by mouth daily   Patient not taking: Reported on 7/12/2023   albuterol (PROVENTIL HFA,VENTOLIN HFA) 90 mcg/act inhaler   No No   Sig: Inhale 2 puffs every 4 (four) hours as needed for wheezing (Wheezing with spacer and mask)   Patient not taking: Reported on 4/17/2023   famotidine (PEPCID) 20 mg/2.5 mL oral suspension   Yes No   Sig: Take 2.4 mg by mouth every 24 hours   Patient not taking: Reported on 4/17/2023   ibuprofen (MOTRIN) 100 mg/5 mL suspension   No No   Sig: Take 4.8 mL (96 mg total) by mouth every 6 (six) hours as needed for mild pain   Patient not taking: Reported on 4/17/2023   loratadine 5 mg/5 mL syrup   No No   Sig: Take 2.5 mL (2.5 mg total) by mouth daily for 7 days   prednisoLONE (ORAPRED) 15 mg/5 mL oral solution   No No   Sig: Give 4 ml daily for 5 days   Patient not taking: Reported on 4/17/2023      Facility-Administered Medications: None       Past Medical History:   Diagnosis Date   • COVID        History reviewed. No pertinent surgical history.     Family History   Problem Relation Age of Onset   • Heart attack Maternal Grandfather 40        Copied from mother's family history at birth   • Hypertension Maternal Grandfather         Copied from mother's family history at birth   • Depression Maternal Grandfather         Copied from mother's family history at birth   • Thyroid disease Maternal Grandfather         Copied from mother's family history at birth   • Depression Maternal Grandmother         Copied from mother's family history at birth   • Migraines Maternal Grandmother         Copied from mother's family history at birth   • Yessenia-Danlos syndrome Maternal Grandmother         Copied from mother's family history at birth   • Heart disease Maternal Grandmother         postural orthostatic tachycardia syndrome (Copied from mother's family history at birth)   • Mental illness Mother         Copied from mother's history at birth     I have reviewed and agree with the history as documented. E-Cigarette/Vaping     E-Cigarette/Vaping Substances     Social History     Tobacco Use   • Smoking status: Never     Passive exposure: Never   • Smokeless tobacco: Never        Review of Systems   Constitutional: Positive for fever and irritability. Negative for chills. HENT: Positive for rhinorrhea. Negative for ear pain and sore throat. Eyes: Negative for pain and redness. Respiratory: Positive for cough. Negative for wheezing. Cardiovascular: Negative for chest pain and leg swelling. Gastrointestinal: Negative for abdominal pain and vomiting. Genitourinary: Negative for frequency and hematuria. Musculoskeletal: Negative for gait problem and joint swelling. Skin: Negative for color change and rash. Neurological: Negative for seizures and syncope. All other systems reviewed and are negative.       Physical Exam  ED Triage Vitals   Temperature Pulse Respirations BP SpO2   09/16/23 1145 09/16/23 1145 09/16/23 1142 -- 09/16/23 1142   (!) 101.1 °F (38.4 °C) (S) (!) 198 29  100 %      Temp src Heart Rate Source Patient Position - Orthostatic VS BP Location FiO2 (%)   09/16/23 1145 09/16/23 1145 -- -- --   Temporal Monitor         Pain Score       09/16/23 1159       Med Not Given for Pain - for MAR use only             Orthostatic Vital Signs  Vitals:    09/16/23 1145 09/16/23 1251   Pulse: (S) (!) 198 (!) 178       Physical Exam  Vitals and nursing note reviewed. Constitutional:       General: He is active. He is not in acute distress. Comments: irritable   HENT:      Right Ear: Tympanic membrane normal. A PE tube is present. Left Ear: A PE tube is present. Tympanic membrane is erythematous. Ears:      Comments: Drainage noted in the left ear     Nose: Rhinorrhea present. Mouth/Throat:      Mouth: Mucous membranes are moist.      Pharynx: Oropharynx is clear. No posterior oropharyngeal erythema. Eyes:      General:         Right eye: No discharge. Left eye: No discharge. Conjunctiva/sclera: Conjunctivae normal.   Cardiovascular:      Rate and Rhythm: Regular rhythm. Heart sounds: S1 normal and S2 normal. No murmur heard. Pulmonary:      Effort: Pulmonary effort is normal. No respiratory distress. Breath sounds: Normal breath sounds. No stridor. No wheezing. Comments: B/l crackles heard  Abdominal:      General: Bowel sounds are normal.      Palpations: Abdomen is soft. Tenderness: There is no abdominal tenderness. Musculoskeletal:         General: No swelling. Normal range of motion. Cervical back: Neck supple. Lymphadenopathy:      Cervical: No cervical adenopathy. Skin:     General: Skin is warm and dry. Capillary Refill: Capillary refill takes less than 2 seconds. Findings: No rash. Neurological:      Mental Status: He is alert.          ED Medications  Medications   ibuprofen (MOTRIN) oral suspension 126 mg (126 mg Oral Given 9/16/23 1159)       Diagnostic Studies  Results Reviewed     None                 XR chest 1 view portable   ED Interpretation by Rocio Cisneros DO (09/16 1244)   Airway is midline. Patchy left upper lobe and questionable right perihilar consolidations. No pneumothorax. No pleural effusions. Cardiac and mediastinal silhouettes are within normal limits. Osseous structures appear normal for age. Procedures  Procedures      ED Course         Medical Decision Making  Patient is a 5year-old male who presents to the ER with his mother for fever. Temperature noted to be 101.1. Associated symptoms include cough rhinorrhea. On exam, left tympanic membrane noted to be erythematous with drainage through the tube. Bilateral crackles heard on auscultation. Differential include viral URI, strep throat, pneumonia, acute otitis media. CXR concerning for infiltration in the left upper lobe and right perihilar region. Suspect pneumonia and left acute otitis media. We will discharge him on oral antibiotics and otic solution. Advised mom to give Tylenol every 4-6 hours as needed for fever. Advised mom to follow-up with his pediatrician as soon as possible. Return precautions discussed. Amount and/or Complexity of Data Reviewed  Radiology: ordered and independent interpretation performed. Risk  Prescription drug management.             Disposition  Final diagnoses:   Pneumonia of left upper lobe and right perihilar region   Acute serous otitis media of left ear, recurrence not specified     Time reflects when diagnosis was documented in both MDM as applicable and the Disposition within this note     Time User Action Codes Description Comment    9/16/2023 12:44 PM Kory Rodrigeuz [J18.9] Pneumonia     9/16/2023 12:45 PM Lexii Villasenor Modify [J18.9] Pneumonia of left upper lobe and right perihilar region     9/16/2023 12:45 PM Lily Girard Add [H65.02] Acute serous otitis media of left ear, recurrence not specified       ED Disposition     ED Disposition   Discharge Condition   Stable    Date/Time   Sat Sep 16, 2023 12:44 PM    Comment   Theron Vazquez discharge to home/self care. Follow-up Information     Follow up With Specialties Details Why Josselin Johnson MD Pediatrics Schedule an appointment as soon as possible for a visit   [address] [address]  [phone]            Patient's Medications   Discharge Prescriptions    AMOXICILLIN (AMOXIL) 400 MG/5ML SUSPENSION    Take 7.1 mL (568 mg total) by mouth 2 (two) times a day for 5 days       Start Date: 9/16/2023 End Date: 9/21/2023       Order Dose: 568 mg       Quantity: 71 mL    Refills: 0    OFLOXACIN (FLOXIN) 0.3 % OTIC SOLUTION    Administer 5 drops into the left ear 2 (two) times a day for 10 days       Start Date: 9/16/2023 End Date: 9/26/2023       Order Dose: 5 drops       Quantity: 5 mL    Refills: 0     No discharge procedures on file. PDMP Review     None           ED Provider  Attending physically available and evaluated Theron Vazquez. I managed the patient along with the ED Attending.     Electronically Signed by         Juliana Sands MD  09/16/23 3907 No indicators present

## 2024-06-04 NOTE — H&P ADULT - NSICDXPASTSURGICALHX_GEN_ALL_CORE_FT
PAST SURGICAL HISTORY:  Status post insertion of percutaneous endoscopic gastrostomy (PEG) tube

## 2024-06-05 ENCOUNTER — RESULT REVIEW (OUTPATIENT)
Age: 64
End: 2024-06-05

## 2024-06-05 PROBLEM — I10 ESSENTIAL (PRIMARY) HYPERTENSION: Chronic | Status: ACTIVE | Noted: 2024-06-04

## 2024-06-05 PROBLEM — E11.9 TYPE 2 DIABETES MELLITUS WITHOUT COMPLICATIONS: Chronic | Status: ACTIVE | Noted: 2024-06-04

## 2024-06-05 PROBLEM — I46.9 CARDIAC ARREST, CAUSE UNSPECIFIED: Chronic | Status: ACTIVE | Noted: 2024-06-04

## 2024-06-05 LAB
ANION GAP SERPL CALC-SCNC: 12 MMOL/L — SIGNIFICANT CHANGE UP (ref 5–17)
BUN SERPL-MCNC: 15 MG/DL — SIGNIFICANT CHANGE UP (ref 7–23)
CALCIUM SERPL-MCNC: 9.3 MG/DL — SIGNIFICANT CHANGE UP (ref 8.4–10.5)
CHLORIDE SERPL-SCNC: 101 MMOL/L — SIGNIFICANT CHANGE UP (ref 96–108)
CO2 SERPL-SCNC: 25 MMOL/L — SIGNIFICANT CHANGE UP (ref 22–31)
CREAT SERPL-MCNC: 0.95 MG/DL — SIGNIFICANT CHANGE UP (ref 0.5–1.3)
EGFR: 90 ML/MIN/1.73M2 — SIGNIFICANT CHANGE UP
GLUCOSE BLDC GLUCOMTR-MCNC: 133 MG/DL — HIGH (ref 70–99)
GLUCOSE BLDC GLUCOMTR-MCNC: 172 MG/DL — HIGH (ref 70–99)
GLUCOSE BLDC GLUCOMTR-MCNC: 212 MG/DL — HIGH (ref 70–99)
GLUCOSE BLDC GLUCOMTR-MCNC: 311 MG/DL — HIGH (ref 70–99)
GLUCOSE SERPL-MCNC: 174 MG/DL — HIGH (ref 70–99)
HCT VFR BLD CALC: 43.2 % — SIGNIFICANT CHANGE UP (ref 39–50)
HGB BLD-MCNC: 13.3 G/DL — SIGNIFICANT CHANGE UP (ref 13–17)
MAGNESIUM SERPL-MCNC: 2 MG/DL — SIGNIFICANT CHANGE UP (ref 1.6–2.6)
MCHC RBC-ENTMCNC: 24.8 PG — LOW (ref 27–34)
MCHC RBC-ENTMCNC: 30.8 GM/DL — LOW (ref 32–36)
MCV RBC AUTO: 80.4 FL — SIGNIFICANT CHANGE UP (ref 80–100)
NRBC # BLD: 0 /100 WBCS — SIGNIFICANT CHANGE UP (ref 0–0)
PA ADP PRP-ACNC: 137 PRU — LOW (ref 194–418)
PHOSPHATE SERPL-MCNC: 3 MG/DL — SIGNIFICANT CHANGE UP (ref 2.5–4.5)
PLATELET # BLD AUTO: 311 K/UL — SIGNIFICANT CHANGE UP (ref 150–400)
POTASSIUM SERPL-MCNC: 3.9 MMOL/L — SIGNIFICANT CHANGE UP (ref 3.5–5.3)
POTASSIUM SERPL-SCNC: 3.9 MMOL/L — SIGNIFICANT CHANGE UP (ref 3.5–5.3)
RBC # BLD: 5.37 M/UL — SIGNIFICANT CHANGE UP (ref 4.2–5.8)
RBC # FLD: 14.2 % — SIGNIFICANT CHANGE UP (ref 10.3–14.5)
SODIUM SERPL-SCNC: 138 MMOL/L — SIGNIFICANT CHANGE UP (ref 135–145)
WBC # BLD: 6.35 K/UL — SIGNIFICANT CHANGE UP (ref 3.8–10.5)
WBC # FLD AUTO: 6.35 K/UL — SIGNIFICANT CHANGE UP (ref 3.8–10.5)

## 2024-06-05 PROCEDURE — 75561 CARDIAC MRI FOR MORPH W/DYE: CPT | Mod: 26

## 2024-06-05 PROCEDURE — 99222 1ST HOSP IP/OBS MODERATE 55: CPT

## 2024-06-05 PROCEDURE — 93306 TTE W/DOPPLER COMPLETE: CPT | Mod: 26

## 2024-06-05 PROCEDURE — 99232 SBSQ HOSP IP/OBS MODERATE 35: CPT

## 2024-06-05 PROCEDURE — 99233 SBSQ HOSP IP/OBS HIGH 50: CPT

## 2024-06-05 RX ORDER — SACUBITRIL AND VALSARTAN 24; 26 MG/1; MG/1
1 TABLET, FILM COATED ORAL
Refills: 0 | Status: DISCONTINUED | OUTPATIENT
Start: 2024-06-06 | End: 2024-06-06

## 2024-06-05 RX ADMIN — HEPARIN SODIUM 5000 UNIT(S): 5000 INJECTION INTRAVENOUS; SUBCUTANEOUS at 14:32

## 2024-06-05 RX ADMIN — HEPARIN SODIUM 5000 UNIT(S): 5000 INJECTION INTRAVENOUS; SUBCUTANEOUS at 05:55

## 2024-06-05 RX ADMIN — Medication 12.5 MILLIGRAM(S): at 17:02

## 2024-06-05 RX ADMIN — Medication 5 UNIT(S): at 12:00

## 2024-06-05 RX ADMIN — SODIUM CHLORIDE 3 MILLILITER(S): 9 INJECTION INTRAMUSCULAR; INTRAVENOUS; SUBCUTANEOUS at 06:07

## 2024-06-05 RX ADMIN — INSULIN GLARGINE 12 UNIT(S): 100 INJECTION, SOLUTION SUBCUTANEOUS at 23:51

## 2024-06-05 RX ADMIN — Medication 2: at 07:36

## 2024-06-05 RX ADMIN — Medication 5 UNIT(S): at 07:55

## 2024-06-05 RX ADMIN — SODIUM CHLORIDE 3 MILLILITER(S): 9 INJECTION INTRAMUSCULAR; INTRAVENOUS; SUBCUTANEOUS at 14:29

## 2024-06-05 RX ADMIN — Medication 5 UNIT(S): at 17:03

## 2024-06-05 RX ADMIN — PANTOPRAZOLE SODIUM 40 MILLIGRAM(S): 20 TABLET, DELAYED RELEASE ORAL at 06:02

## 2024-06-05 RX ADMIN — ATORVASTATIN CALCIUM 80 MILLIGRAM(S): 80 TABLET, FILM COATED ORAL at 23:40

## 2024-06-05 RX ADMIN — ISOSORBIDE MONONITRATE 30 MILLIGRAM(S): 60 TABLET, EXTENDED RELEASE ORAL at 11:33

## 2024-06-05 RX ADMIN — SENNA PLUS 2 TABLET(S): 8.6 TABLET ORAL at 23:40

## 2024-06-05 RX ADMIN — Medication 4: at 17:02

## 2024-06-05 RX ADMIN — Medication 8: at 11:59

## 2024-06-05 RX ADMIN — HEPARIN SODIUM 5000 UNIT(S): 5000 INJECTION INTRAVENOUS; SUBCUTANEOUS at 23:42

## 2024-06-05 RX ADMIN — SODIUM CHLORIDE 3 MILLILITER(S): 9 INJECTION INTRAMUSCULAR; INTRAVENOUS; SUBCUTANEOUS at 21:30

## 2024-06-05 RX ADMIN — Medication 81 MILLIGRAM(S): at 11:33

## 2024-06-05 NOTE — PROGRESS NOTE ADULT - ASSESSMENT
Patient is a 64 year old male with PMHX of DM2, R PCA Stroke (1/2024, no deficits), HFrEF, and recent hospitalization for cardiac/respiratory arrest s/p ROSC (1/2024) who presented to [facility] on 5/31/24 with orthopnea x 2 days. He reports he has been having SOB and pressure like sensation on chest when supine x 2 days. He was admitted to [place] for suspected acute CHF exacerbation as BNP was elevated to 5272.  He received IV diuresis and cardiology was consulted and a new echocardiogram was obtained which showed EF had decreased from 30% (1/2024) to 25%.  He was transferred [facility] for cath. At [facility], patient was noted to be febrile to 101, RVP and blood cultures negative. He also had mild transaminitis but RUQ showed cholelithiasis without wall thickening.  He underwent cardiac cath which showed 3vCAD and is transferred to [facility] on 6/4/24 under Dr. Ardon for CABG work-up.      Neuro:   Hx of R PCA Stroke   - no new deficits reported   - CT head done here, old stroke.  Nothing new.    - neuro consult today for pre-op clearance   - will continue ASA 81 mg   - holding Plavix given perioperative status   - Pre op carotids negative.   No active pain  No delirium, no focal deficits     Cardiac:   Pre-op for CABG   - f/u PFTs.  Carotids done, CXR done.  - F/U echo with [facility] per Dr. Ardon.   - Cardiac MRI   - continue lipitor 80 mg, ASA 81 mg, and metoprolol 12.5 mg   - Imdur 30 mg for coronary dilation, uptitrate if needed  - trending P2Y12 as patient takes Plavix at home.  Last one 171.  Ordered repeat for tomorrow.    - OR date pending Cardiac MRI viability.    HFrEF  - EF found to be reduced to 25%, required IV diuresis at [place]   -HF to follow.   - holding lasix 20 mg   HTN   - holding home valsartan 40 mg   - continue metoprolol 12.5 mg   - starting imdur 30 mg given 3vCAD  HLD  - continue high dose statin  CAD  - continue ASA 81 mg   - hold plavix pre-op  Vital signs stable on arrival   -BP/HR stable    Pulm:   Saturating well on room air   Encouraging IS   CXR: stable    GI:   Tolerating diet well   GI PPx: Protonix  Continue with bowel regimen - D/C if needed.     Renal:   Monitor I/Os   BUN/creatinine stable    Heme:   H/H stable  DVT prophylaxis: SCDs and SQH 5000U    ID:   Fever of 101 @ [place]   - BCx and RVP negative   - afebrile on arrival   - F/U blood cultures and check another set today  - f/u WBC count and repeat BCX  Monitor fever curve     MSK:   Encourage ambulation   PT/OT     Endocrine:   Hx of DM2  - A1C: pending   - home regimen Lantus 12 U in AM and Lispro with meals   - continue ISS, lantus 12U, and lispro while in house  No Hx of thyroid disease   - TSH: pending   Monitor blood glucose levels     Dispo:  OR Date Pending    Patient is a 64 year old male with PMHX of DM2, R PCA Stroke (1/2024, no deficits), HFrEF, and recent hospitalization for cardiac/respiratory arrest s/p ROSC (1/2024) who presented to [facility] on 5/31/24 with orthopnea x 2 days. He reports he has been having SOB and pressure like sensation on chest when supine x 2 days. He was admitted to [place] for suspected acute CHF exacerbation as BNP was elevated to 5272.  He received IV diuresis and cardiology was consulted and a new echocardiogram was obtained which showed EF had decreased from 30% (1/2024) to 25%.  He was transferred [facility] for cath. At [facility], patient was noted to be febrile to 101, RVP and blood cultures negative. He also had mild transaminitis but RUQ showed cholelithiasis without wall thickening.  He underwent cardiac cath which showed 3vCAD and is transferred to [facility] on 6/4/24 under Dr. Ardon for CABG work-up.      Neuro:   Hx of R PCA Stroke   - no new deficits reported   - CT head done here, old stroke.  Nothing new.    - neuro consult today for pre-op clearance   - will continue ASA 81 mg   - holding Plavix given perioperative status   - Pre op carotids negative.   No active pain  No delirium, no focal deficits     Cardiac:   Pre-op for CABG   - f/u PFTs.  Carotids done, CXR done.  - F/U echo with [facility] per Dr. Ardon.   - Cardiac MRI   - continue lipitor 80 mg, ASA 81 mg, and metoprolol 12.5 mg   - Imdur 30 mg for coronary dilation, uptitrate if needed  - trending P2Y12 as patient takes Plavix at home.  Last one 171.  Ordered repeat for tomorrow.    - OR date pending Cardiac MRI viability.    HFrEF  - EF found to be reduced to 25%, required IV diuresis at [place]   -HF to follow.   - holding lasix 20 mg   HTN   - holding home valsartan 40 mg   - continue metoprolol 12.5 mg   - starting imdur 30 mg given 3vCAD  HLD  - continue high dose statin  CAD  - continue ASA 81 mg   - hold plavix pre-op  Vital signs stable on arrival   -BP/HR stable    Pulm:   Saturating well on room air   Encouraging IS   CXR: stable    GI:   Tolerating diet well   GI PPx: Protonix  Continue with bowel regimen - D/C if loose or frequent stool.      Renal:   Monitor I/Os   BUN/creatinine stable    Heme:   H/H stable  DVT prophylaxis: SCDs and SQH 5000U    ID:   Fever of 101 @ [place]   - BCx and RVP negative   - afebrile on arrival   - F/U blood cultures and check another set today  - f/u WBC count and repeat BCX       MSK:   Encourage ambulation   PT/OT     Endocrine:   Hx of DM2  - A1C: pending   - home regimen Lantus 12 U in AM and Lispro with meals   - continue ISS, lantus 12U, and lispro while in house  No Hx of thyroid disease   - TSH: pending   Monitor blood glucose levels     Dispo:  OR Date Pending    Patient is a 64 year old male with PMHX of DM2, R PCA Stroke (1/2024, no deficits), HFrEF, and recent hospitalization for cardiac/respiratory arrest s/p ROSC (1/2024) who presented to [facility] on 5/31/24 with orthopnea x 2 days. He reports he has been having SOB and pressure like sensation on chest when supine x 2 days. He was admitted to [place] for suspected acute CHF exacerbation as BNP was elevated to 5272.  He received IV diuresis and cardiology was consulted and a new echocardiogram was obtained which showed EF had decreased from 30% (1/2024) to 25%.  He was transferred [facility] for cath. At [facility], patient was noted to be febrile to 101, RVP and blood cultures negative. He also had mild transaminitis but RUQ showed cholelithiasis without wall thickening.  He underwent cardiac cath which showed 3vCAD and is transferred to [facility] on 6/4/24 under Dr. Ardon for CABG work-up.      Neuro:   Hx of R PCA Stroke   - no new deficits reported   - Has residual left eye gaze that is chronic (since 1/2024 stroke) per the patient.   - CT head done here, old stroke.  Nothing new.    - neuro consult today for pre-op clearance   - will continue ASA 81 mg   - holding Plavix given perioperative status   - Pre op carotids negative.   No active pain  No delirium, no focal deficits     HEENT:    -Left facial swelling/mass.  Pt states he got this worked up, CT scan etc, and was old that it's benign and nothing to do for it.  It has never bothered him in any way or caused any pain.     Cardiac:   Pre-op for CABG   - f/u PFTs.  Carotids done, CXR done.  - F/U echo with [facility] per Dr. Ardon.   - Cardiac MRI   - continue lipitor 80 mg, ASA 81 mg, and metoprolol 12.5 mg   - Imdur 30 mg for coronary dilation, uptitrate if needed  - trending P2Y12 as patient takes Plavix at home.  Last one 171.  Ordered repeat for tomorrow.    - OR date pending Cardiac MRI viability.    HFrEF  - EF found to be reduced to 25%, required IV diuresis at [place]   -HF to follow.   - holding lasix 20 mg   HTN   - holding home valsartan 40 mg   - continue metoprolol 12.5 mg   - starting imdur 30 mg given 3vCAD  HLD  - continue high dose statin  CAD  - continue ASA 81 mg   - hold plavix pre-op  Vital signs stable on arrival   -BP/HR stable    Pulm:   Saturating well on room air   Encouraging IS   CXR: stable    GI:   Tolerating diet well   GI PPx: Protonix  Continue with bowel regimen - D/C if loose or frequent stool.      Renal:   Monitor I/Os   BUN/creatinine stable    Heme:   H/H stable  DVT prophylaxis: SCDs and SQH 5000U    ID:   Fever of 101 @ [facility]   - BCx and RVP negative   - afebrile on arrival   - F/U blood cultures and check another set today  - f/u WBC count and repeat BCX       MSK:   Encourage ambulation   PT/OT     Endocrine:   Hx of DM2  - A1C: pending   - home regimen Lantus 12 U in AM and Lispro with meals   - continue ISS, lantus 12U, and lispro while in house  No Hx of thyroid disease   - TSH: pending   Monitor blood glucose levels     Dispo:  OR Date Pending

## 2024-06-05 NOTE — PROGRESS NOTE ADULT - SUBJECTIVE AND OBJECTIVE BOX
Patient discussed on morning rounds with Garlnad Mcnamara    Operation / Date: CAD.  Pre op for CABG    SUBJECTIVE ASSESSMENT:  Patient   Denies CP/SOB/N/V/D/dizziness/cough/fever/chills.      Vital Signs Last 24 Hrs  T(C): 36.4 (05 Jun 2024 05:01), Max: 37.2 (04 Jun 2024 22:11)  T(F): 97.6 (05 Jun 2024 05:01), Max: 98.9 (04 Jun 2024 22:11)  HR: 91 (05 Jun 2024 05:55) (86 - 97)  BP: 96/51 (05 Jun 2024 05:55) (84/52 - 140/69)  BP(mean): 69 (05 Jun 2024 05:55) (62 - 95)  RR: 16 (05 Jun 2024 05:55) (16 - 17)  SpO2: 97% (05 Jun 2024 05:55) (93% - 99%)    Parameters below as of 05 Jun 2024 05:55  Patient On (Oxygen Delivery Method): room air      I&O's Detail    04 Jun 2024 07:01  -  05 Jun 2024 07:00  --------------------------------------------------------  IN:    Oral Fluid: 100 mL  Total IN: 100 mL    OUT:    Voided (mL): 1050 mL  Total OUT: 1050 mL    Total NET: -950 mL        PHYSICAL EXAM:    GEN: NAD, looks comfortable  Psych: Mood appropriate  Neuro: A&Ox3.  No focal deficits.  Moving all extremities.   HEENT: No obvious abnormalities  CV: S1S2, regular, no murmurs appreciated.  No carotid bruits.  No JVD  Lungs: Clear B/L.  No wheezing, rales or rhonchi  ABD: Soft, non-tender, non-distended.  +Bowel sounds  EXT: Warm and well perfused.  No peripheral edema noted  Musculoskeletal: Moving all extremities with normal ROM, no joint swelling  PV: Pedal pulses palpable      LABS:                        13.3   6.35  )-----------( 311      ( 05 Jun 2024 05:30 )             43.2         PT/INR - ( 04 Jun 2024 19:43 )   PT: 12.6 sec;   INR: 1.11          PTT - ( 04 Jun 2024 19:43 )  PTT:28.4 sec    06-05    138  |  101  |  15  ----------------------------<  174<H>  3.9   |  25  |  0.95    Ca    9.3      05 Jun 2024 05:30  Phos  3.0     06-05  Mg     2.0     06-05    TPro  6.1  /  Alb  3.5  /  TBili  0.5  /  DBili  x   /  AST  20  /  ALT  23  /  AlkPhos  100  06-04      Urinalysis Basic - ( 05 Jun 2024 05:30 )    Color: x / Appearance: x / SG: x / pH: x  Gluc: 174 mg/dL / Ketone: x  / Bili: x / Urobili: x   Blood: x / Protein: x / Nitrite: x   Leuk Esterase: x / RBC: x / WBC x   Sq Epi: x / Non Sq Epi: x / Bacteria: x        MEDICATIONS  (STANDING):  aspirin enteric coated 81 milliGRAM(s) Oral daily  atorvastatin 80 milliGRAM(s) Oral at bedtime  dextrose 10% Bolus 125 milliLiter(s) IV Bolus once  dextrose 5%. 1000 milliLiter(s) (50 mL/Hr) IV Continuous <Continuous>  dextrose 5%. 1000 milliLiter(s) (100 mL/Hr) IV Continuous <Continuous>  dextrose 50% Injectable 12.5 Gram(s) IV Push once  dextrose 50% Injectable 25 Gram(s) IV Push once  glucagon  Injectable 1 milliGRAM(s) IntraMuscular once  heparin   Injectable 5000 Unit(s) SubCutaneous every 8 hours  insulin glargine Injectable (LANTUS) 12 Unit(s) SubCutaneous at bedtime  insulin lispro (ADMELOG) corrective regimen sliding scale   SubCutaneous Before meals and at bedtime  insulin lispro Injectable (ADMELOG) 5 Unit(s) SubCutaneous three times a day before meals  isosorbide   mononitrate ER Tablet (IMDUR) 30 milliGRAM(s) Oral daily  metoprolol tartrate 12.5 milliGRAM(s) Oral every 12 hours  pantoprazole    Tablet 40 milliGRAM(s) Oral before breakfast  polyethylene glycol 3350 17 Gram(s) Oral daily  senna 2 Tablet(s) Oral at bedtime  sodium chloride 0.9% lock flush 3 milliLiter(s) IV Push every 8 hours    MEDICATIONS  (PRN):  dextrose Oral Gel 15 Gram(s) Oral once PRN Blood Glucose LESS THAN 70 milliGRAM(s)/deciliter  nitroglycerin     SubLingual 0.4 milliGRAM(s) SubLingual every 5 minutes PRN Chest Pain        RADIOLOGY & ADDITIONAL TESTS:     Patient discussed on morning rounds with Garland Mcnamara    Operation / Date: CAD.  Pre op for CABG    SUBJECTIVE ASSESSMENT:  Patient denies any chest pain.  He has recently been experiencing worsening SOB which is why he initially presented, but currently not short of breath at rest.    Denies CP/N/V/D/dizziness/cough/fever/chills.      Vital Signs Last 24 Hrs  T(C): 36.4 (05 Jun 2024 05:01), Max: 37.2 (04 Jun 2024 22:11)  T(F): 97.6 (05 Jun 2024 05:01), Max: 98.9 (04 Jun 2024 22:11)  HR: 91 (05 Jun 2024 05:55) (86 - 97)  BP: 96/51 (05 Jun 2024 05:55) (84/52 - 140/69)  BP(mean): 69 (05 Jun 2024 05:55) (62 - 95)  RR: 16 (05 Jun 2024 05:55) (16 - 17)  SpO2: 97% (05 Jun 2024 05:55) (93% - 99%)    Parameters below as of 05 Jun 2024 05:55  Patient On (Oxygen Delivery Method): room air      I&O's Detail    04 Jun 2024 07:01  -  05 Jun 2024 07:00  --------------------------------------------------------  IN:    Oral Fluid: 100 mL  Total IN: 100 mL    OUT:    Voided (mL): 1050 mL  Total OUT: 1050 mL    Total NET: -950 mL      PHYSICAL EXAM:    GEN: NAD, looks comfortable; Sitting up in the chair.    Psych: Mood appropriate  Neuro: A&Ox3.  See HEENT.  Moving all extremities, equal in strength.   HEENT: Left eye w/ some lateral gaze.  Left pupil reactive to light but slightly more sluggish compared to right.   **Left cheek with protruding ?mass.  Slightly hard, not moveable.  No erythema.  (Not new per patient)  CV: S1S2, regular, no murmurs appreciated.  No carotid bruits.  No JVD  Lungs: Clear B/L.  No wheezing, rales or rhonchi  ABD: Soft, non-tender, non-distended.  +Bowel sounds  EXT: Warm and well perfused.  No peripheral edema noted  Musculoskeletal: Moving all extremities with normal ROM, no joint swelling  PV: Pedal pulses palpable      LABS:                        13.3   6.35  )-----------( 311      ( 05 Jun 2024 05:30 )             43.2       PT/INR - ( 04 Jun 2024 19:43 )   PT: 12.6 sec;   INR: 1.11          PTT - ( 04 Jun 2024 19:43 )  PTT:28.4 sec    06-05    138  |  101  |  15  ----------------------------<  174<H>  3.9   |  25  |  0.95    Ca    9.3      05 Jun 2024 05:30  Phos  3.0     06-05  Mg     2.0     06-05    TPro  6.1  /  Alb  3.5  /  TBili  0.5  /  DBili  x   /  AST  20  /  ALT  23  /  AlkPhos  100  06-04      Urinalysis Basic - ( 05 Jun 2024 05:30 )    Color: x / Appearance: x / SG: x / pH: x  Gluc: 174 mg/dL / Ketone: x  / Bili: x / Urobili: x   Blood: x / Protein: x / Nitrite: x   Leuk Esterase: x / RBC: x / WBC x   Sq Epi: x / Non Sq Epi: x / Bacteria: x      MEDICATIONS  (STANDING):  aspirin enteric coated 81 milliGRAM(s) Oral daily  atorvastatin 80 milliGRAM(s) Oral at bedtime  dextrose 10% Bolus 125 milliLiter(s) IV Bolus once  dextrose 5%. 1000 milliLiter(s) (50 mL/Hr) IV Continuous <Continuous>  dextrose 5%. 1000 milliLiter(s) (100 mL/Hr) IV Continuous <Continuous>  dextrose 50% Injectable 12.5 Gram(s) IV Push once  dextrose 50% Injectable 25 Gram(s) IV Push once  glucagon  Injectable 1 milliGRAM(s) IntraMuscular once  heparin   Injectable 5000 Unit(s) SubCutaneous every 8 hours  insulin glargine Injectable (LANTUS) 12 Unit(s) SubCutaneous at bedtime  insulin lispro (ADMELOG) corrective regimen sliding scale   SubCutaneous Before meals and at bedtime  insulin lispro Injectable (ADMELOG) 5 Unit(s) SubCutaneous three times a day before meals  isosorbide   mononitrate ER Tablet (IMDUR) 30 milliGRAM(s) Oral daily  metoprolol tartrate 12.5 milliGRAM(s) Oral every 12 hours  pantoprazole    Tablet 40 milliGRAM(s) Oral before breakfast  polyethylene glycol 3350 17 Gram(s) Oral daily  senna 2 Tablet(s) Oral at bedtime  sodium chloride 0.9% lock flush 3 milliLiter(s) IV Push every 8 hours    MEDICATIONS  (PRN):  dextrose Oral Gel 15 Gram(s) Oral once PRN Blood Glucose LESS THAN 70 milliGRAM(s)/deciliter  nitroglycerin     SubLingual 0.4 milliGRAM(s) SubLingual every 5 minutes PRN Chest Pain        RADIOLOGY & ADDITIONAL TESTS:

## 2024-06-05 NOTE — CONSULT NOTE ADULT - NS ATTEND AMEND GEN_ALL_CORE FT
The patient is a 64 year old male w multiple vascular risk factors admitted for CABG. Patient has h/o large R PCA stroke in 01/2024 in s/o cardiac arrest s/p ROSC. Etiology of stroke likely cardioembolic related to arrest. MRA head w/o significant stenosis. MRA neck recommended but does not appear to have been done. Recent carotid US with anterograde flow in VA's. Ideally would have dedicated MRA/CTA neck to ensure no large posterior circulation disease. However, given flow on US, likely adequate.  Neurologically cleared to proceed with CABG. Antiplatelet per CT Surgeyr. Continue statin. Rec to avoid BP fluctuations. Ideally would have prolonged cardiac monitoring which can be done in the outpatient setting.

## 2024-06-05 NOTE — CONSULT NOTE ADULT - ASSESSMENT
64y Male with PMHx of DM2, R PCA Stroke (1/2024, no deficits), HFrEF, and recent hospitalization for cardiac/respiratory arrest s/p ROSC (1/2024) who presented to [facility] on 5/31 with orthopnea x 2 days. He underwent cardiac cath which showed 3vCAD and is transferred to [facility] on 6/4 under Dr. Ardon for CABG work-up. Stroke team consulted for surgical clearance given history of R PCA infarction this past January. Prior MRA head performed (without neck vessel imaging), reviewed and without high grade intracranial stenosis.     - given his stroke is in the posterior circulation location, in absence of dedicated vertebral artery imaging, unable to rule out vertebral disease; antegrade flow noted from carotid ultrasounds  - would continue antiplatelet medications for cardiac and stroke prevention (ASA 81mg daily for stroke prevention)  - LDL: 47  - A1C: 6.5%  - TSH: 0.544  - patient denies any extended heart monitoring after his R PCA stroke - will require ILR vs. holter   - please have patient see us after discharge for stroke follow up - [phone]  - patient is cleared to undergo cardiac surgery from stroke standpoint - would avoid episodes of hypotension    Case discussed with Dr. Genia Calle   
65 yo man with MVCAD and HFrEF, prior CVA and cardiac arrest, not on adequate GDMT. Moderately remodeled LV with normal filling pressures by echo and physical exam at this time. Has resting tachycardia, borderline SBP and cool extremities. Severely elevated pBNP, normal troponins.    Plan will be to initiated GDMT and return for CABG in next 6-8 weeks.   Will plan for HF follow-up at [facility] with Dr. Cueva.    - Stop Imdur  - Start Entresto 24-26 mg bid   - Continue Metoprolol, switch to XL 25 mg daily prior to discharge   - Continue ASA and statin   - Hold diuretics for now, ok to discharge off diuretics, on ARNI  - Plan for elective CABG in 6-8 weeks with Dr. Duglas Lemon MD

## 2024-06-05 NOTE — CONSULT NOTE ADULT - SUBJECTIVE AND OBJECTIVE BOX
**STROKE CONSULT NOTE**    HPI: 64y Male with PMHx of DM2, R PCA Stroke (1/2024, no deficits), HFrEF, and recent hospitalization for cardiac/respiratory arrest s/p ROSC (1/2024) who presented to [facility] on 5/31 with orthopnea x 2 days. He reports he has been having sob and pressure like sensation on chest when supine x 2 days. He was admitted to [place] for suspected acute CHF exacerbation as BNP was elevated to 5272. HE recieved IV diuresis and cardiology was consulted and a new echocardiogram was obtained which showed EF had decreased from 30% (1/2024) to 25%. Cardiology believed he would benefit from cardiac cath so he was transferred [facility] for cath. At [facility], patient was noted to be febrile to 101, RVP and blood cultures negative. He also had mild transaminitis but RUQ showed cholelithiasis without wall thickening. He underwent cardiac cath which showed 3vCAD and is transferred to [facility] on 6/4 under Dr. Ardon for CABG work-u.  Stroke team consulted for surgical clearance given history of R PCA infarction this past January.    T(C): 36.2 (06-05-24 @ 13:33), Max: 37.2 (06-04-24 @ 22:11)  HR: 105 (06-05-24 @ 12:33) (86 - 106)  BP: 104/56 (06-05-24 @ 12:33) (84/52 - 140/69)  RR: 16 (06-05-24 @ 12:33) (16 - 17)  SpO2: 100% (06-05-24 @ 12:33) (93% - 100%)    PAST MEDICAL & SURGICAL HISTORY:  DM (diabetes mellitus)      Cardiac arrest      HTN (hypertension)      HLD (hyperlipidemia)      Status post insertion of percutaneous endoscopic gastrostomy (PEG) tube          FAMILY HISTORY:    ROS:   Constitutional: No fever, weight loss or fatigue  Eyes: No eye pain  ENMT:  No difficulty hearing, tinnitus; No sinus or throat pain  Neck: No pain or stiffness  Respiratory: No cough, wheezing, chills or hemoptysis  Cardiovascular: No chest pain, palpitations, shortness of breath, or leg swelling  Gastrointestinal: No abdominal pain. No nausea, vomiting or hematemesis; No diarrhea or constipation. Nohematochezia.  Genitourinary: No dysuria, frequency, hematuria or incontinence  Neurological: As per HPI  Skin: No itching, burning, rashes or lesions   Endocrine: No heat or cold intolerance; No hair loss  Musculoskeletal: No joint pain or swelling; No muscle, back or extremity pain  Heme/Lymph: No easy bruising or bleeding gums    MEDICATIONS  (STANDING):  aspirin enteric coated 81 milliGRAM(s) Oral daily  atorvastatin 80 milliGRAM(s) Oral at bedtime  dextrose 10% Bolus 125 milliLiter(s) IV Bolus once  dextrose 5%. 1000 milliLiter(s) (50 mL/Hr) IV Continuous <Continuous>  dextrose 5%. 1000 milliLiter(s) (100 mL/Hr) IV Continuous <Continuous>  dextrose 50% Injectable 25 Gram(s) IV Push once  dextrose 50% Injectable 12.5 Gram(s) IV Push once  glucagon  Injectable 1 milliGRAM(s) IntraMuscular once  heparin   Injectable 5000 Unit(s) SubCutaneous every 8 hours  insulin glargine Injectable (LANTUS) 12 Unit(s) SubCutaneous at bedtime  insulin lispro (ADMELOG) corrective regimen sliding scale   SubCutaneous Before meals and at bedtime  insulin lispro Injectable (ADMELOG) 5 Unit(s) SubCutaneous three times a day before meals  isosorbide   mononitrate ER Tablet (IMDUR) 30 milliGRAM(s) Oral daily  metoprolol tartrate 12.5 milliGRAM(s) Oral every 12 hours  pantoprazole    Tablet 40 milliGRAM(s) Oral before breakfast  polyethylene glycol 3350 17 Gram(s) Oral daily  senna 2 Tablet(s) Oral at bedtime  sodium chloride 0.9% lock flush 3 milliLiter(s) IV Push every 8 hours    MEDICATIONS  (PRN):  dextrose Oral Gel 15 Gram(s) Oral once PRN Blood Glucose LESS THAN 70 milliGRAM(s)/deciliter  nitroglycerin     SubLingual 0.4 milliGRAM(s) SubLingual every 5 minutes PRN Chest Pain    Allergies    No Known Allergies    Intolerances      Vital Signs Last 24 Hrs  T(C): 36.2 (05 Jun 2024 13:33), Max: 37.2 (04 Jun 2024 22:11)  T(F): 97.1 (05 Jun 2024 13:33), Max: 98.9 (04 Jun 2024 22:11)  HR: 105 (05 Jun 2024 12:33) (86 - 106)  BP: 104/56 (05 Jun 2024 12:33) (84/52 - 140/69)  BP(mean): 74 (05 Jun 2024 12:33) (62 - 95)  RR: 16 (05 Jun 2024 12:33) (16 - 17)  SpO2: 100% (05 Jun 2024 12:33) (93% - 100%)    Parameters below as of 05 Jun 2024 12:33  Patient On (Oxygen Delivery Method): room air        Physical exam:  Constitutional: No acute distress, conversant  Eyes: Anicteric sclerae, moist conjunctivae, see below for CNs  Neck: trachea midline, FROM, supple, no thyromegaly or lymphadenopathy  Cardiovascular: Regular rate  Pulmonary:  No use of accessory muscles  GI: Abdomen soft, non-distended, non-tender  Extremities:  no edema    Neurologic:  -Mental status: Awake, alert, oriented to person, place, and time. Speech is fluent with intact naming, repetition, and comprehension, no dysarthria. Recent and remote memory intact. Follows commands. Attention/concentration intact. Fund of knowledge appropriate.  -Cranial nerves:   II: left upper quadrantanopia   III, IV, VI: When looking midline, left eye appears to have a trochlear nerve palsy.   V:  Facial sensation V1-V3 equal and intact   VII: Face is symmetric with normal eye closure and smile  Motor: Normal bulk and tone. No pronator drift. Strength bilateral upper extremity 5/5, bilateral lower extremities 5/5.  Sensation: Intact to light touch bilaterally. No neglect or extinction on double simultaneous testing.  Coordination: No dysmetria on finger-to-nose    NIHSS: 1    Fingerstick Blood Glucose: CAPILLARY BLOOD GLUCOSE      POCT Blood Glucose.: 311 mg/dL (05 Jun 2024 11:00)    LABS:                        13.3   6.35  )-----------( 311      ( 05 Jun 2024 05:30 )             43.2     06-05    138  |  101  |  15  ----------------------------<  174<H>  3.9   |  25  |  0.95    Ca    9.3      05 Jun 2024 05:30  Phos  3.0     06-05  Mg     2.0     06-05    TPro  6.1  /  Alb  3.5  /  TBili  0.5  /  DBili  x   /  AST  20  /  ALT  23  /  AlkPhos  100  06-04    PT/INR - ( 04 Jun 2024 19:43 )   PT: 12.6 sec;   INR: 1.11          PTT - ( 04 Jun 2024 19:43 )  PTT:28.4 sec  CARDIAC MARKERS ( 04 Jun 2024 19:43 )  x     / x     / 85 U/L / x     / 1.4 ng/mL      Urinalysis Basic - ( 05 Jun 2024 05:30 )    Color: x / Appearance: x / SG: x / pH: x  Gluc: 174 mg/dL / Ketone: x  / Bili: x / Urobili: x   Blood: x / Protein: x / Nitrite: x   Leuk Esterase: x / RBC: x / WBC x   Sq Epi: x / Non Sq Epi: x / Bacteria: x        RADIOLOGY & ADDITIONAL STUDIES:      < from: CT Head No Cont (06.04.24 @ 19:41) >  IMPRESSION:  No evidence of acute transcortical infarct, acute intracranial   hemorrhage, or mass effect.    < end of copied text >    < from: US Duplex Carotid Arteries Complete, Bilateral (06.04.24 @ 20:04) >  Antegrade flow is noted within both vertebral arteries.    IMPRESSION: No hemodynamically significant stenosis of either carotid   artery.    < end of copied text >    
65 yo man with a history of DM2 and PCA stroke in Jan 2024 complicated by cardiac arrest. Had prolonged hospitalization with eventual trache and PEG placement. At the time of that event his TTE showed a severely depressed LVEF. He was not followed by a cardiologist after discharge.  Returned to the hospital now with orthopnea and PND. No LE edema or CP.    Underwent diuresis with rapid improvement at [facility] and coronary angiogram was done showing MVCAD.    His TTE now shows LVEF ~25%, LVEDD 5.7 with no major valvular abnormalities.   He was referred to [facility] for CABG evaluation.    ICU Vital Signs Last 24 Hrs  T(C): 36.2 (05 Jun 2024 13:33), Max: 37.2 (04 Jun 2024 22:11)  T(F): 97.1 (05 Jun 2024 13:33), Max: 98.9 (04 Jun 2024 22:11)  HR: 105 (05 Jun 2024 12:33) (86 - 106)  BP: 104/56 (05 Jun 2024 12:33) (84/52 - 140/69)  BP(mean): 74 (05 Jun 2024 12:33) (62 - 95)  ABP: --  ABP(mean): --  RR: 16 (05 Jun 2024 12:33) (16 - 17)  SpO2: 100% (05 Jun 2024 12:33) (93% - 100%)    O2 Parameters below as of 05 Jun 2024 12:33  Patient On (Oxygen Delivery Method): room air      - JVP is low normal   - CVS: RRR no MRG   - Pulmonary: CTA b/l   - Ext: No edema, cool                          13.3   6.35  )-----------( 311      ( 05 Jun 2024 05:30 )             43.2     06-05    138  |  101  |  15  ----------------------------<  174<H>  3.9   |  25  |  0.95    Ca    9.3      05 Jun 2024 05:30  Phos  3.0     06-05  Mg     2.0     06-05    TPro  6.1  /  Alb  3.5  /  TBili  0.5  /  DBili  x   /  AST  20  /  ALT  23  /  AlkPhos  100  06-04    pBNP~3,700    Home Medications:  aspirin 81 mg oral tablet, chewable: 1 tab(s) chewed once a day (04 Jun 2024 13:08)  Lantus 100 units/mL subcutaneous solution: 12 unit(s) subcutaneous once a day in the AM (04 Jun 2024 18:11)  Lasix 20 mg oral tablet: 1 tab(s) orally once a day (04 Jun 2024 18:11)  Lipitor 80 mg oral tablet: 1 tab(s) orally once a day (at bedtime) (04 Jun 2024 13:08)  metoprolol tartrate 25 mg oral tablet: 0.5 tab(s) orally 2 times a day (04 Jun 2024 13:09)  nitroglycerin 0.4 mg sublingual tablet: 1 tab(s) sublingually every 5 minutes as needed for  chest pain (04 Jun 2024 13:08)  Plavix 75 mg oral tablet: 1 tab(s) orally once a day (04 Jun 2024 18:11)  valsartan 40 mg oral tablet: 1 tab(s) orally once a day (04 Jun 2024 18:12)

## 2024-06-06 ENCOUNTER — TRANSCRIPTION ENCOUNTER (OUTPATIENT)
Age: 64
End: 2024-06-06

## 2024-06-06 ENCOUNTER — NON-APPOINTMENT (OUTPATIENT)
Age: 64
End: 2024-06-06

## 2024-06-06 VITALS
RESPIRATION RATE: 18 BRPM | HEART RATE: 94 BPM | DIASTOLIC BLOOD PRESSURE: 55 MMHG | SYSTOLIC BLOOD PRESSURE: 92 MMHG | OXYGEN SATURATION: 98 %

## 2024-06-06 PROBLEM — E78.5 HYPERLIPIDEMIA, UNSPECIFIED: Chronic | Status: ACTIVE | Noted: 2024-06-04

## 2024-06-06 LAB
ANION GAP SERPL CALC-SCNC: 12 MMOL/L — SIGNIFICANT CHANGE UP (ref 5–17)
BLD GP AB SCN SERPL QL: NEGATIVE — SIGNIFICANT CHANGE UP
BUN SERPL-MCNC: 16 MG/DL — SIGNIFICANT CHANGE UP (ref 7–23)
CALCIUM SERPL-MCNC: 9 MG/DL — SIGNIFICANT CHANGE UP (ref 8.4–10.5)
CHLORIDE SERPL-SCNC: 107 MMOL/L — SIGNIFICANT CHANGE UP (ref 96–108)
CO2 SERPL-SCNC: 24 MMOL/L — SIGNIFICANT CHANGE UP (ref 22–31)
CREAT SERPL-MCNC: 0.84 MG/DL — SIGNIFICANT CHANGE UP (ref 0.5–1.3)
EGFR: 99 ML/MIN/1.73M2 — SIGNIFICANT CHANGE UP
GLUCOSE BLDC GLUCOMTR-MCNC: 131 MG/DL — HIGH (ref 70–99)
GLUCOSE BLDC GLUCOMTR-MCNC: 289 MG/DL — HIGH (ref 70–99)
GLUCOSE SERPL-MCNC: 136 MG/DL — HIGH (ref 70–99)
HCT VFR BLD CALC: 37.2 % — LOW (ref 39–50)
HGB BLD-MCNC: 12 G/DL — LOW (ref 13–17)
MAGNESIUM SERPL-MCNC: 1.8 MG/DL — SIGNIFICANT CHANGE UP (ref 1.6–2.6)
MCHC RBC-ENTMCNC: 25.1 PG — LOW (ref 27–34)
MCHC RBC-ENTMCNC: 32.3 GM/DL — SIGNIFICANT CHANGE UP (ref 32–36)
MCV RBC AUTO: 77.8 FL — LOW (ref 80–100)
NRBC # BLD: 0 /100 WBCS — SIGNIFICANT CHANGE UP (ref 0–0)
PA ADP PRP-ACNC: 194 PRU — SIGNIFICANT CHANGE UP (ref 194–418)
PLATELET # BLD AUTO: 241 K/UL — SIGNIFICANT CHANGE UP (ref 150–400)
POTASSIUM SERPL-MCNC: 4.1 MMOL/L — SIGNIFICANT CHANGE UP (ref 3.5–5.3)
POTASSIUM SERPL-SCNC: 4.1 MMOL/L — SIGNIFICANT CHANGE UP (ref 3.5–5.3)
RBC # BLD: 4.78 M/UL — SIGNIFICANT CHANGE UP (ref 4.2–5.8)
RBC # FLD: 14.5 % — SIGNIFICANT CHANGE UP (ref 10.3–14.5)
RH IG SCN BLD-IMP: POSITIVE — SIGNIFICANT CHANGE UP
SODIUM SERPL-SCNC: 143 MMOL/L — SIGNIFICANT CHANGE UP (ref 135–145)
WBC # BLD: 6.54 K/UL — SIGNIFICANT CHANGE UP (ref 3.8–10.5)
WBC # FLD AUTO: 6.54 K/UL — SIGNIFICANT CHANGE UP (ref 3.8–10.5)

## 2024-06-06 PROCEDURE — C8929: CPT

## 2024-06-06 PROCEDURE — 82550 ASSAY OF CK (CPK): CPT

## 2024-06-06 PROCEDURE — 85027 COMPLETE CBC AUTOMATED: CPT

## 2024-06-06 PROCEDURE — 85576 BLOOD PLATELET AGGREGATION: CPT

## 2024-06-06 PROCEDURE — A9577: CPT

## 2024-06-06 PROCEDURE — 86900 BLOOD TYPING SEROLOGIC ABO: CPT

## 2024-06-06 PROCEDURE — 80048 BASIC METABOLIC PNL TOTAL CA: CPT

## 2024-06-06 PROCEDURE — 70450 CT HEAD/BRAIN W/O DYE: CPT | Mod: MC

## 2024-06-06 PROCEDURE — 80053 COMPREHEN METABOLIC PANEL: CPT

## 2024-06-06 PROCEDURE — 94150 VITAL CAPACITY TEST: CPT

## 2024-06-06 PROCEDURE — 85610 PROTHROMBIN TIME: CPT

## 2024-06-06 PROCEDURE — 93005 ELECTROCARDIOGRAM TRACING: CPT

## 2024-06-06 PROCEDURE — 83735 ASSAY OF MAGNESIUM: CPT

## 2024-06-06 PROCEDURE — 83880 ASSAY OF NATRIURETIC PEPTIDE: CPT

## 2024-06-06 PROCEDURE — 84443 ASSAY THYROID STIM HORMONE: CPT

## 2024-06-06 PROCEDURE — 83036 HEMOGLOBIN GLYCOSYLATED A1C: CPT

## 2024-06-06 PROCEDURE — 85025 COMPLETE CBC W/AUTO DIFF WBC: CPT

## 2024-06-06 PROCEDURE — 84100 ASSAY OF PHOSPHORUS: CPT

## 2024-06-06 PROCEDURE — 84484 ASSAY OF TROPONIN QUANT: CPT

## 2024-06-06 PROCEDURE — 86850 RBC ANTIBODY SCREEN: CPT

## 2024-06-06 PROCEDURE — 87040 BLOOD CULTURE FOR BACTERIA: CPT

## 2024-06-06 PROCEDURE — 94010 BREATHING CAPACITY TEST: CPT | Mod: 26

## 2024-06-06 PROCEDURE — 86901 BLOOD TYPING SEROLOGIC RH(D): CPT

## 2024-06-06 PROCEDURE — 75561 CARDIAC MRI FOR MORPH W/DYE: CPT

## 2024-06-06 PROCEDURE — 71046 X-RAY EXAM CHEST 2 VIEWS: CPT

## 2024-06-06 PROCEDURE — 80061 LIPID PANEL: CPT

## 2024-06-06 PROCEDURE — 93880 EXTRACRANIAL BILAT STUDY: CPT

## 2024-06-06 PROCEDURE — 85730 THROMBOPLASTIN TIME PARTIAL: CPT

## 2024-06-06 PROCEDURE — 36415 COLL VENOUS BLD VENIPUNCTURE: CPT

## 2024-06-06 PROCEDURE — 82962 GLUCOSE BLOOD TEST: CPT

## 2024-06-06 PROCEDURE — 82553 CREATINE MB FRACTION: CPT

## 2024-06-06 PROCEDURE — 81003 URINALYSIS AUTO W/O SCOPE: CPT

## 2024-06-06 RX ORDER — METOPROLOL TARTRATE 50 MG
25 TABLET ORAL DAILY
Refills: 0 | Status: DISCONTINUED | OUTPATIENT
Start: 2024-06-07 | End: 2024-06-06

## 2024-06-06 RX ORDER — CLOPIDOGREL BISULFATE 75 MG/1
1 TABLET, FILM COATED ORAL
Refills: 0 | DISCHARGE

## 2024-06-06 RX ORDER — METOPROLOL TARTRATE 50 MG
1 TABLET ORAL
Qty: 30 | Refills: 0
Start: 2024-06-06 | End: 2024-07-05

## 2024-06-06 RX ORDER — CLOPIDOGREL BISULFATE 75 MG/1
1 TABLET, FILM COATED ORAL
Qty: 30 | Refills: 0
Start: 2024-06-06 | End: 2024-07-05

## 2024-06-06 RX ORDER — METOPROLOL TARTRATE 50 MG
0.5 TABLET ORAL
Refills: 0 | DISCHARGE

## 2024-06-06 RX ORDER — ASPIRIN/CALCIUM CARB/MAGNESIUM 324 MG
1 TABLET ORAL
Refills: 0 | DISCHARGE

## 2024-06-06 RX ORDER — VALSARTAN 80 MG/1
1 TABLET ORAL
Refills: 0 | DISCHARGE

## 2024-06-06 RX ORDER — ATORVASTATIN CALCIUM 80 MG/1
1 TABLET, FILM COATED ORAL
Qty: 30 | Refills: 0
Start: 2024-06-06 | End: 2024-07-05

## 2024-06-06 RX ORDER — ISOSORBIDE MONONITRATE 60 MG/1
1 TABLET, EXTENDED RELEASE ORAL
Qty: 30 | Refills: 0
Start: 2024-06-06 | End: 2024-07-05

## 2024-06-06 RX ORDER — SACUBITRIL AND VALSARTAN 24; 26 MG/1; MG/1
1 TABLET, FILM COATED ORAL
Qty: 60 | Refills: 0
Start: 2024-06-06 | End: 2024-07-05

## 2024-06-06 RX ORDER — FUROSEMIDE 40 MG
1 TABLET ORAL
Refills: 0 | DISCHARGE

## 2024-06-06 RX ORDER — ASPIRIN/CALCIUM CARB/MAGNESIUM 324 MG
1 TABLET ORAL
Qty: 30 | Refills: 0
Start: 2024-06-06 | End: 2024-07-05

## 2024-06-06 RX ORDER — ATORVASTATIN CALCIUM 80 MG/1
1 TABLET, FILM COATED ORAL
Refills: 0 | DISCHARGE

## 2024-06-06 RX ADMIN — Medication 81 MILLIGRAM(S): at 11:41

## 2024-06-06 RX ADMIN — Medication 12.5 MILLIGRAM(S): at 06:06

## 2024-06-06 RX ADMIN — Medication 5 UNIT(S): at 07:17

## 2024-06-06 RX ADMIN — SODIUM CHLORIDE 3 MILLILITER(S): 9 INJECTION INTRAMUSCULAR; INTRAVENOUS; SUBCUTANEOUS at 12:03

## 2024-06-06 RX ADMIN — HEPARIN SODIUM 5000 UNIT(S): 5000 INJECTION INTRAVENOUS; SUBCUTANEOUS at 06:06

## 2024-06-06 RX ADMIN — Medication 5 UNIT(S): at 11:44

## 2024-06-06 RX ADMIN — SODIUM CHLORIDE 3 MILLILITER(S): 9 INJECTION INTRAMUSCULAR; INTRAVENOUS; SUBCUTANEOUS at 06:43

## 2024-06-06 RX ADMIN — PANTOPRAZOLE SODIUM 40 MILLIGRAM(S): 20 TABLET, DELAYED RELEASE ORAL at 07:10

## 2024-06-06 RX ADMIN — ISOSORBIDE MONONITRATE 30 MILLIGRAM(S): 60 TABLET, EXTENDED RELEASE ORAL at 11:41

## 2024-06-06 RX ADMIN — Medication 6: at 11:43

## 2024-06-06 RX ADMIN — SACUBITRIL AND VALSARTAN 1 TABLET(S): 24; 26 TABLET, FILM COATED ORAL at 06:06

## 2024-06-06 NOTE — DISCHARGE NOTE NURSING/CASE MANAGEMENT/SOCIAL WORK - NSDCFUADDAPPT_GEN_ALL_CORE_FT
-Please follow up with Dr. Ardon on 6/25/24 @ 2:00m via TELEHEALTH visit.  This is a phone call visit.  Please CALL our office at the time of your appointment @  #[phone].  Please call us with any questions.      -Please follow up with Dr. Cueva in July.  Their office will call you with the appointment time.  If you don't hear from them in the next 2 weeks please call their office.      -Walk daily as tolerated, avoid strenuous activity or exercise until cleared by your Cardiologist.      -Call your doctor if you have shortness of breath, chest pain not relieved by pain medication, dizziness, or fever >100.4.

## 2024-06-06 NOTE — DISCHARGE NOTE PROVIDER - NSDCFUSCHEDAPPT_GEN_ALL_CORE_FT
Garland Ardon  [facility] Physician [facility]  CTSURG [address]  Scheduled Appointment: 06/07/2024    Milad Macdonald  [facility]  ENDOCRIN [address]  Scheduled Appointment: 06/18/2024     Milad Macdonald  [facility]  ENDOCRIN [address]  Scheduled Appointment: 06/18/2024

## 2024-06-06 NOTE — DISCHARGE NOTE PROVIDER - HOSPITAL COURSE
Patient is a 64 year old male with PMHX of DM2, R PCA Stroke (1/2024, no deficits), HFrEF, and recent hospitalization for cardiac/respiratory arrest s/p ROSC (1/2024) who presented to [facility] on 5/31/24 with orthopnea x 2 days. He reports he has been having sob and pressure like sensation on chest when supine x 2 days. He was admitted to [place] for suspected acute CHF exacerbation as BNP was elevated to 5272. He received IV diuresis and cardiology was consulted and a new echocardiogram was obtained which showed EF had decreased from 30% (1/2024) to 25%. Cardiology believed he would benefit from cardiac cath so he was transferred [facility] for cath. At [facility], patient was noted to be febrile to 101, RVP and blood cultures negative. He also had mild transaminitis but RUQ showed cholelithiasis without wall thickening. He underwent cardiac cath which showed 3vCAD and is transferred to [facility] on 6/4/24 under Dr. Ardon for CABG work-up.   Denies any chest pain while at [facility].  He had an echo with Definity contrast showing areas of hypokinesis and akinesis.  Cardiac MRI done, results to be reviewed by Dr. Ardon and heart failure team.  Patient stable for discharge home today on GDMT and will F/U with heart failure as outpatient for medical therapy optimization.  He will F/U with Dr. Ardon in 3 weeks for on-going surgical evaluation.    GEN: NAD, looks comfortable, on room air.  Psych: Mood appropriate  Neuro: A&Ox3.  No focal deficits.  Moving all extremities.   HEENT: No obvious abnormalities  CV: S1S2, regular, no murmurs appreciated.  No carotid bruits.  No JVD  Lungs: Clear B/L.  No wheezing, rales or rhonchi  ABD: Soft, non-tender, non-distended.  +Bowel sounds  EXT: Warm and well perfused.  No peripheral edema noted  Musculoskeletal: Moving all extremities with normal ROM, no joint swelling  PV: Pedal pulses palpable

## 2024-06-06 NOTE — DISCHARGE NOTE NURSING/CASE MANAGEMENT/SOCIAL WORK - NSDCPEFALRISK_GEN_ALL_CORE
For information on Fall & Injury Prevention, visit: [email or web address] OR  [email or web address] OR  https://www.cdc.gov/steadi/patient.html

## 2024-06-06 NOTE — DISCHARGE NOTE PROVIDER - CARE PROVIDER_API CALL
Garland Ardon  Thoracic and Cardiac Surgery  [address]  [place] [address]  Phone: [phone]  Fax: [phone]  Follow Up Time:     Christine Cueva  Adv Heart Fail Trnspl[facility] Cardio  [address] [place] [id number]  Phone: [phone]  Fax: [phone]  Follow Up Time:

## 2024-06-06 NOTE — DISCHARGE NOTE PROVIDER - NSDCMRMEDTOKEN_GEN_ALL_CORE_FT
aspirin 81 mg oral tablet, chewable: 1 tab(s) chewed once a day  Lantus 100 units/mL subcutaneous solution: 12 unit(s) subcutaneous once a day in the AM  Lasix 20 mg oral tablet: 1 tab(s) orally once a day  Lipitor 80 mg oral tablet: 1 tab(s) orally once a day (at bedtime)  metoprolol tartrate 25 mg oral tablet: 0.5 tab(s) orally 2 times a day  nitroglycerin 0.4 mg sublingual tablet: 1 tab(s) sublingually every 5 minutes as needed for  chest pain  Plavix 75 mg oral tablet: 1 tab(s) orally once a day  valsartan 40 mg oral tablet: 1 tab(s) orally once a day   aspirin 81 mg oral tablet, chewable: 1 tab(s) chewed once a day  Lantus 100 units/mL subcutaneous solution: 12 unit(s) subcutaneous once a day in the AM  Lasix 20 mg oral tablet: 1 tab(s) orally once a day  Lipitor 80 mg oral tablet: 1 tab(s) orally once a day (at bedtime)  nitroglycerin 0.4 mg sublingual tablet: 1 tab(s) sublingually every 5 minutes as needed for  chest pain  Plavix 75 mg oral tablet: 1 tab(s) orally once a day   aspirin 81 mg oral tablet, chewable: 1 tab(s) chewed once a day  isosorbide mononitrate 30 mg oral tablet, extended release: 1 tab(s) orally once a day  Lantus 100 units/mL subcutaneous solution: 12 unit(s) subcutaneous once a day in the AM  Lipitor 80 mg oral tablet: 1 tab(s) orally once a day (at bedtime)  metoprolol succinate 25 mg oral tablet, extended release: 1 tab(s) orally once a day  nitroglycerin 0.4 mg sublingual tablet: 1 tab(s) sublingually every 5 minutes as needed for  chest pain  Plavix 75 mg oral tablet: 1 tab(s) orally once a day  sacubitril-valsartan 24 mg-26 mg oral tablet: 1 tab(s) orally 2 times a day

## 2024-06-06 NOTE — DISCHARGE NOTE NURSING/CASE MANAGEMENT/SOCIAL WORK - PATIENT PORTAL LINK FT
You can access the FollowMyHealth Patient Portal offered by [facility] by registering at the following website: [email or web address]. By joining [employer]’s FollowMyHealth portal, you will also be able to view your health information using other applications (apps) compatible with our system.

## 2024-06-06 NOTE — DISCHARGE NOTE PROVIDER - NSDCFUADDAPPT_GEN_ALL_CORE_FT
-Please follow up with Dr. Ardon on _____.  The office is located at [facility], [facility], 4th floor. Call us with any questions #[phone].    -Please follow up with Dr. Cueva on _____.     -Walk daily as tolerated, avoid strenuous activity or exercise until cleared by your Cardiologist.      -Call your doctor if you have shortness of breath, chest pain not relieved by pain medication, dizziness, or fever >100.4.   -Please follow up with Dr. Ardon on 6/25/24 @ 2:00m via TELEHEALTH visit.  This is a phone call visit.  Please CALL our office at the time of your appointment @  #[phone].  Please call us with any questions.      -Please follow up with Dr. Cueva in July.  Their office will call you with the appointment time.  If you don't hear from them in the next 2 weeks please call their office.      -Walk daily as tolerated, avoid strenuous activity or exercise until cleared by your Cardiologist.      -Call your doctor if you have shortness of breath, chest pain not relieved by pain medication, dizziness, or fever >100.4.

## 2024-06-06 NOTE — DISCHARGE NOTE PROVIDER - CARE PROVIDERS DIRECT ADDRESSES
[email or web address]

## 2024-06-07 ENCOUNTER — APPOINTMENT (OUTPATIENT)
Dept: CARDIOTHORACIC SURGERY | Facility: HOSPITAL | Age: 64
End: 2024-06-07

## 2024-06-10 DIAGNOSIS — I50.22 CHRONIC SYSTOLIC (CONGESTIVE) HEART FAILURE: ICD-10-CM

## 2024-06-10 DIAGNOSIS — Z79.82 LONG TERM (CURRENT) USE OF ASPIRIN: ICD-10-CM

## 2024-06-10 DIAGNOSIS — Z79.02 LONG TERM (CURRENT) USE OF ANTITHROMBOTICS/ANTIPLATELETS: ICD-10-CM

## 2024-06-10 DIAGNOSIS — Z79.4 LONG TERM (CURRENT) USE OF INSULIN: ICD-10-CM

## 2024-06-10 DIAGNOSIS — Z86.74 PERSONAL HISTORY OF SUDDEN CARDIAC ARREST: ICD-10-CM

## 2024-06-10 DIAGNOSIS — I11.0 HYPERTENSIVE HEART DISEASE WITH HEART FAILURE: ICD-10-CM

## 2024-06-10 DIAGNOSIS — E78.5 HYPERLIPIDEMIA, UNSPECIFIED: ICD-10-CM

## 2024-06-10 DIAGNOSIS — K80.20 CALCULUS OF GALLBLADDER WITHOUT CHOLECYSTITIS WITHOUT OBSTRUCTION: ICD-10-CM

## 2024-06-10 DIAGNOSIS — E11.9 TYPE 2 DIABETES MELLITUS WITHOUT COMPLICATIONS: ICD-10-CM

## 2024-06-10 DIAGNOSIS — I25.10 ATHEROSCLEROTIC HEART DISEASE OF NATIVE CORONARY ARTERY WITHOUT ANGINA PECTORIS: ICD-10-CM

## 2024-06-10 DIAGNOSIS — Z86.73 PERSONAL HISTORY OF TRANSIENT ISCHEMIC ATTACK (TIA), AND CEREBRAL INFARCTION WITHOUT RESIDUAL DEFICITS: ICD-10-CM

## 2024-06-10 LAB
CULTURE RESULTS: SIGNIFICANT CHANGE UP
SPECIMEN SOURCE: SIGNIFICANT CHANGE UP

## 2024-06-18 ENCOUNTER — APPOINTMENT (OUTPATIENT)
Dept: ENDOCRINOLOGY | Facility: CLINIC | Age: 64
End: 2024-06-18
Payer: COMMERCIAL

## 2024-06-18 VITALS
WEIGHT: 151 LBS | DIASTOLIC BLOOD PRESSURE: 69 MMHG | OXYGEN SATURATION: 99 % | BODY MASS INDEX: 22.88 KG/M2 | HEART RATE: 85 BPM | SYSTOLIC BLOOD PRESSURE: 116 MMHG | HEIGHT: 68 IN

## 2024-06-18 DIAGNOSIS — E13.9 OTHER SPECIFIED DIABETES MELLITUS W/OUT COMPLICATIONS: ICD-10-CM

## 2024-06-18 DIAGNOSIS — I63.9 CEREBRAL INFARCTION, UNSPECIFIED: ICD-10-CM

## 2024-06-18 DIAGNOSIS — I25.10 ATHEROSCLEROTIC HEART DISEASE OF NATIVE CORONARY ARTERY W/OUT ANGINA PECTORIS: ICD-10-CM

## 2024-06-18 DIAGNOSIS — E78.5 HYPERLIPIDEMIA, UNSPECIFIED: ICD-10-CM

## 2024-06-18 LAB — GLUCOSE BLDC GLUCOMTR-MCNC: 151

## 2024-06-18 PROCEDURE — G2211 COMPLEX E/M VISIT ADD ON: CPT

## 2024-06-18 PROCEDURE — 99214 OFFICE O/P EST MOD 30 MIN: CPT

## 2024-06-18 PROCEDURE — 82962 GLUCOSE BLOOD TEST: CPT

## 2024-06-18 RX ORDER — UBIDECARENONE/VIT E ACET 100MG-5
1000 CAPSULE ORAL
Refills: 0 | Status: DISCONTINUED | COMMUNITY
End: 2024-06-18

## 2024-06-18 RX ORDER — SIMETHICONE CHEW TAB 80 MG 80 MG
80 TABLET ORAL
Refills: 0 | Status: DISCONTINUED | COMMUNITY
End: 2024-06-18

## 2024-06-18 RX ORDER — SACUBITRIL AND VALSARTAN 24; 26 MG/1; MG/1
24-26 TABLET, FILM COATED ORAL
Refills: 0 | Status: ACTIVE | COMMUNITY

## 2024-06-18 NOTE — PHYSICAL EXAM
[Alert] : alert [Healthy Appearance] : healthy appearance [No Acute Distress] : no acute distress [Well Developed] : well developed [Normal Voice/Communication] : normal voice communication [EOMI] : extra ocular movement intact [Normal Hearing] : hearing was normal [No Respiratory Distress] : no respiratory distress [Normal Rate and Effort] : normal respiratory rate and effort [Normal Rate] : heart rate was normal [Regular Rhythm] : with a regular rhythm [No Edema] : no peripheral edema [Pedal Pulses Normal] : the pedal pulses are present [Spine Straight] : spine straight [No Stigmata of Cushings Syndrome] : no stigmata of Cushings Syndrome [Normal Gait] : normal gait [Normal Strength/Tone] : muscle strength and tone were normal [No Rash] : no rash [Acanthosis Nigricans] : no acanthosis nigricans [Foot Ulcers] : no foot ulcers [Hirsutism] : no hirsutism [Normal Sensation on Monofilament Testing] : normal sensation on monofilament testing of lower extremities [Oriented x3] : oriented to person, place, and time [Normal Affect] : the affect was normal [Recent Memory Normal] : recent memory was not impaired [Normal Insight/Judgement] : insight and judgment were intact [Normal Mood] : the mood was normal [Remote Memory Normal] : remote memory was not impaired [de-identified] : Left sided exotropia

## 2024-06-18 NOTE — REVIEW OF SYSTEMS
[Fatigue] : no fatigue [Decreased Appetite] : appetite not decreased [Recent Weight Gain (___ Lbs)] : no recent weight gain [Recent Weight Loss (___ Lbs)] : no recent weight loss [Chest Pain] : no chest pain [Leg Claudication] : no leg claudication [Palpitations] : no palpitations [Lower Ext Edema] : no lower extremity edema [Shortness Of Breath] : no shortness of breath [Polyuria] : no polyuria [Dysuria] : no dysuria [Polydipsia] : no polydipsia [All other systems negative] : All other systems negative

## 2024-06-18 NOTE — HISTORY OF PRESENT ILLNESS
[FreeTextEntry1] : In January 2024 the patient was admitted to [facility] for cardiac and respiratory arrest found to have myocardial infarction and CVA.  There is now consideration of CABG. he reports that blood glucose levels have been even more variable since atorvastatin dose increased and addition of metoprolol.  "Michi" DM2 diagnosed more than 20 years ago, complicated with CAD, CVA, and BIRGIT. Taking Lantus 12 units daily and Humalog 7-9 units before meals with SSI Compliance: Good Tolerating: Yes Therapies tried: Insulin only? Last HgA1c in September 2023 was 7.7%, previously 7.2% in February 2023. Checks BG at least 4 times daily; rarely above 200 mg/dL. Fasting BG ranges from 50- 150 mg/dl. Patient not interested in wearing CGM sensor due to concern of others seeing it and knowing he has DM. Hypoglycemia: Denies severe episodes. Possibly developing hypoglycemia unawareness. Polyuria, polydipsia, unintentional weight loss: Denies   Diabetic emergencies: Denies In January 2024 the patient was admitted to [facility] for cardiac and respiratory arrest found to have myocardial infarction and CVA.  Microvascular complications Last albumin/creatinine ratio: Normal; October 2021 Neuropathy symptoms: Denies Microfilament exam: normal; June 2024 Retinopathy: Yes, sees specialist regularly. Last eye exam: At least annually   Macrovascular complications CAD/CVA/PVD: CAD, CVA   HTN: No, not on ACEI or ARB. LDL: 123 mg/dL in 2/2023 while on atorvastatin 20 mg daily. Goal is < 70 mg/dL.   Diet: Carb-restrictive Exercise: 5,000 steps per day Immunizations: Needs pneumonia vaccine. Otherwise, UTD. Family DM History: Both parents.

## 2024-06-18 NOTE — REASON FOR VISIT
[Follow - Up] : a follow-up visit [DM Type 2] : DM Type 2 [Spouse] : spouse [Source: ______] : History obtained from [facility] [FreeTextEntry2] : Dr. Schmidt

## 2024-06-27 DIAGNOSIS — Z79.4 TYPE 2 DIABETES MELLITUS W/OUT COMPLICATIONS: ICD-10-CM

## 2024-06-27 DIAGNOSIS — E11.9 TYPE 2 DIABETES MELLITUS W/OUT COMPLICATIONS: ICD-10-CM

## 2024-06-27 LAB
ALBUMIN SERPL ELPH-MCNC: 4.5 G/DL
ALP BLD-CCNC: 96 U/L
ALT SERPL-CCNC: 25 U/L
ANION GAP SERPL CALC-SCNC: 15 MMOL/L
AST SERPL-CCNC: 26 U/L
BASOPHILS # BLD AUTO: 0.08 K/UL
BASOPHILS NFR BLD AUTO: 1.2 %
BILIRUB SERPL-MCNC: 0.4 MG/DL
BUN SERPL-MCNC: 17 MG/DL
C PEPTIDE SERPL-MCNC: 1.7 NG/ML
CALCIUM SERPL-MCNC: 9.4 MG/DL
CHLORIDE SERPL-SCNC: 108 MMOL/L
CHOLEST SERPL-MCNC: 117 MG/DL
CO2 SERPL-SCNC: 23 MMOL/L
CREAT SERPL-MCNC: 0.92 MG/DL
CREAT SPEC-SCNC: 102 MG/DL
EGFR: 94 ML/MIN/1.73M2
EOSINOPHIL # BLD AUTO: 0.53 K/UL
EOSINOPHIL NFR BLD AUTO: 8.1 %
ESTIMATED AVERAGE GLUCOSE: 166 MG/DL
FRUCTOSAMINE SERPL-MCNC: 299 UMOL/L
GAD65 AB SER-MCNC: 0 NMOL/L
GLUCOSE SERPL-MCNC: 158 MG/DL
HBA1C MFR BLD HPLC: 7.4 %
HCT VFR BLD CALC: 41.3 %
HDLC SERPL-MCNC: 53 MG/DL
HGB BLD-MCNC: 12.3 G/DL
IMM GRANULOCYTES NFR BLD AUTO: 0.2 %
LDLC SERPL CALC-MCNC: 49 MG/DL
LYMPHOCYTES # BLD AUTO: 1.37 K/UL
LYMPHOCYTES NFR BLD AUTO: 21 %
MAN DIFF?: NORMAL
MCHC RBC-ENTMCNC: 24.2 PG
MCHC RBC-ENTMCNC: 29.8 GM/DL
MCV RBC AUTO: 81.1 FL
MICROALBUMIN 24H UR DL<=1MG/L-MCNC: <1.2 MG/DL
MICROALBUMIN/CREAT 24H UR-RTO: NORMAL MG/G
MONOCYTES # BLD AUTO: 0.42 K/UL
MONOCYTES NFR BLD AUTO: 6.4 %
NEUTROPHILS # BLD AUTO: 4.11 K/UL
NEUTROPHILS NFR BLD AUTO: 63.1 %
NONHDLC SERPL-MCNC: 64 MG/DL
PLATELET # BLD AUTO: 293 K/UL
POTASSIUM SERPL-SCNC: 4.8 MMOL/L
PROT SERPL-MCNC: 7.1 G/DL
RBC # BLD: 5.09 M/UL
RBC # FLD: 15 %
SODIUM SERPL-SCNC: 146 MMOL/L
TRIGL SERPL-MCNC: 68 MG/DL
WBC # FLD AUTO: 6.52 K/UL
ZINC TRANSPORTER 8 AB: <15 U/ML

## 2024-07-11 ENCOUNTER — RX RENEWAL (OUTPATIENT)
Age: 64
End: 2024-07-11

## 2024-07-11 ENCOUNTER — NON-APPOINTMENT (OUTPATIENT)
Age: 64
End: 2024-07-11

## 2024-07-11 ENCOUNTER — APPOINTMENT (OUTPATIENT)
Dept: HEART FAILURE | Facility: CLINIC | Age: 64
End: 2024-07-11

## 2024-07-11 VITALS
WEIGHT: 155.13 LBS | DIASTOLIC BLOOD PRESSURE: 74 MMHG | BODY MASS INDEX: 23.51 KG/M2 | HEIGHT: 68 IN | TEMPERATURE: 98 F | HEART RATE: 90 BPM | OXYGEN SATURATION: 98 % | SYSTOLIC BLOOD PRESSURE: 128 MMHG

## 2024-07-11 PROCEDURE — 99214 OFFICE O/P EST MOD 30 MIN: CPT

## 2024-07-11 PROCEDURE — 99204 OFFICE O/P NEW MOD 45 MIN: CPT

## 2024-07-11 RX ORDER — ISOSORBIDE MONONITRATE 30 MG/1
30 TABLET, EXTENDED RELEASE ORAL DAILY
Qty: 90 | Refills: 3 | Status: ACTIVE | COMMUNITY
Start: 1900-01-01 | End: 1900-01-01

## 2024-07-11 RX ORDER — METOPROLOL SUCCINATE 50 MG/1
50 TABLET, EXTENDED RELEASE ORAL DAILY
Qty: 90 | Refills: 3 | Status: ACTIVE | COMMUNITY
Start: 2024-07-11 | End: 1900-01-01

## 2024-07-12 ENCOUNTER — TRANSCRIPTION ENCOUNTER (OUTPATIENT)
Age: 64
End: 2024-07-12

## 2024-07-12 LAB
ALBUMIN SERPL ELPH-MCNC: 4.8 G/DL
ALP BLD-CCNC: 98 U/L
ALT SERPL-CCNC: 27 U/L
ANION GAP SERPL CALC-SCNC: 19 MMOL/L
AST SERPL-CCNC: 38 U/L
BILIRUB SERPL-MCNC: 0.4 MG/DL
CALCIUM SERPL-MCNC: 9.4 MG/DL
CREAT SERPL-MCNC: 0.96 MG/DL
EGFR: 89 ML/MIN/1.73M2
GLUCOSE SERPL-MCNC: 71 MG/DL
NT-PROBNP SERPL-MCNC: 2478 PG/ML
POTASSIUM SERPL-SCNC: 4.2 MMOL/L
PROT SERPL-MCNC: 7.2 G/DL
SODIUM SERPL-SCNC: 143 MMOL/L

## 2024-07-12 RX ORDER — METOPROLOL SUCCINATE 25 MG/1
25 TABLET, EXTENDED RELEASE ORAL
Qty: 60 | Refills: 5 | Status: DISCONTINUED | COMMUNITY
Start: 2024-07-11 | End: 2024-07-12

## 2024-07-16 ENCOUNTER — APPOINTMENT (OUTPATIENT)
Dept: CARDIOTHORACIC SURGERY | Facility: CLINIC | Age: 64
End: 2024-07-16
Payer: COMMERCIAL

## 2024-07-16 DIAGNOSIS — I25.10 ATHEROSCLEROTIC HEART DISEASE OF NATIVE CORONARY ARTERY W/OUT ANGINA PECTORIS: ICD-10-CM

## 2024-07-16 DIAGNOSIS — I50.20 UNSPECIFIED SYSTOLIC (CONGESTIVE) HEART FAILURE: ICD-10-CM

## 2024-07-16 PROCEDURE — 99215 OFFICE O/P EST HI 40 MIN: CPT

## 2024-07-17 ENCOUNTER — NON-APPOINTMENT (OUTPATIENT)
Age: 64
End: 2024-07-17

## 2024-07-17 PROBLEM — I50.20 HFREF (HEART FAILURE WITH REDUCED EJECTION FRACTION): Status: ACTIVE | Noted: 2024-07-11

## 2024-07-28 ENCOUNTER — INPATIENT (INPATIENT)
Facility: HOSPITAL | Age: 64
LOS: 7 days | Discharge: HOME HEALTH | DRG: 235 | End: 2024-08-05
Attending: THORACIC SURGERY (CARDIOTHORACIC VASCULAR SURGERY) | Admitting: THORACIC SURGERY (CARDIOTHORACIC VASCULAR SURGERY)
Payer: COMMERCIAL

## 2024-07-28 VITALS
OXYGEN SATURATION: 100 % | DIASTOLIC BLOOD PRESSURE: 59 MMHG | RESPIRATION RATE: 18 BRPM | SYSTOLIC BLOOD PRESSURE: 127 MMHG | HEART RATE: 65 BPM

## 2024-07-28 DIAGNOSIS — Z93.1 GASTROSTOMY STATUS: Chronic | ICD-10-CM

## 2024-07-28 LAB
A1C WITH ESTIMATED AVERAGE GLUCOSE RESULT: 6.9 % — HIGH (ref 4–5.6)
ACANTHOCYTES BLD QL SMEAR: SLIGHT — SIGNIFICANT CHANGE UP
ALBUMIN SERPL ELPH-MCNC: 4.3 G/DL — SIGNIFICANT CHANGE UP (ref 3.3–5)
ALP SERPL-CCNC: 94 U/L — SIGNIFICANT CHANGE UP (ref 40–120)
ALT FLD-CCNC: 24 U/L — SIGNIFICANT CHANGE UP (ref 10–45)
ANION GAP SERPL CALC-SCNC: 11 MMOL/L — SIGNIFICANT CHANGE UP (ref 5–17)
ANISOCYTOSIS BLD QL: SLIGHT — SIGNIFICANT CHANGE UP
APPEARANCE UR: CLEAR — SIGNIFICANT CHANGE UP
APTT BLD: 32.3 SEC — SIGNIFICANT CHANGE UP (ref 24.5–35.6)
AST SERPL-CCNC: 31 U/L — SIGNIFICANT CHANGE UP (ref 10–40)
BASOPHILS # BLD AUTO: 0.09 K/UL — SIGNIFICANT CHANGE UP (ref 0–0.2)
BASOPHILS NFR BLD AUTO: 1.8 % — SIGNIFICANT CHANGE UP (ref 0–2)
BILIRUB SERPL-MCNC: 0.3 MG/DL — SIGNIFICANT CHANGE UP (ref 0.2–1.2)
BILIRUB UR-MCNC: NEGATIVE — SIGNIFICANT CHANGE UP
BLD GP AB SCN SERPL QL: NEGATIVE — SIGNIFICANT CHANGE UP
BUN SERPL-MCNC: 20 MG/DL — SIGNIFICANT CHANGE UP (ref 7–23)
BURR CELLS BLD QL SMEAR: PRESENT — SIGNIFICANT CHANGE UP
CALCIUM SERPL-MCNC: 9 MG/DL — SIGNIFICANT CHANGE UP (ref 8.4–10.5)
CHLORIDE SERPL-SCNC: 104 MMOL/L — SIGNIFICANT CHANGE UP (ref 96–108)
CHOLEST SERPL-MCNC: 90 MG/DL — SIGNIFICANT CHANGE UP
CO2 SERPL-SCNC: 23 MMOL/L — SIGNIFICANT CHANGE UP (ref 22–31)
COLOR SPEC: YELLOW — SIGNIFICANT CHANGE UP
CREAT SERPL-MCNC: 0.84 MG/DL — SIGNIFICANT CHANGE UP (ref 0.5–1.3)
DIFF PNL FLD: NEGATIVE — SIGNIFICANT CHANGE UP
EGFR: 99 ML/MIN/1.73M2 — SIGNIFICANT CHANGE UP
EOSINOPHIL # BLD AUTO: 0.18 K/UL — SIGNIFICANT CHANGE UP (ref 0–0.5)
EOSINOPHIL NFR BLD AUTO: 3.6 % — SIGNIFICANT CHANGE UP (ref 0–6)
ESTIMATED AVERAGE GLUCOSE: 151 MG/DL — HIGH (ref 68–114)
GIANT PLATELETS BLD QL SMEAR: PRESENT — SIGNIFICANT CHANGE UP
GLUCOSE BLDC GLUCOMTR-MCNC: 125 MG/DL — HIGH (ref 70–99)
GLUCOSE BLDC GLUCOMTR-MCNC: 139 MG/DL — HIGH (ref 70–99)
GLUCOSE SERPL-MCNC: 149 MG/DL — HIGH (ref 70–99)
GLUCOSE UR QL: >=1000 MG/DL
HCT VFR BLD CALC: 33.5 % — LOW (ref 39–50)
HDLC SERPL-MCNC: 47 MG/DL — SIGNIFICANT CHANGE UP
HGB BLD-MCNC: 10.9 G/DL — LOW (ref 13–17)
INR BLD: 0.98 — SIGNIFICANT CHANGE UP (ref 0.85–1.18)
KETONES UR-MCNC: NEGATIVE MG/DL — SIGNIFICANT CHANGE UP
LEUKOCYTE ESTERASE UR-ACNC: NEGATIVE — SIGNIFICANT CHANGE UP
LIPID PNL WITH DIRECT LDL SERPL: 25 MG/DL — SIGNIFICANT CHANGE UP
LYMPHOCYTES # BLD AUTO: 1.45 K/UL — SIGNIFICANT CHANGE UP (ref 1–3.3)
LYMPHOCYTES # BLD AUTO: 29.5 % — SIGNIFICANT CHANGE UP (ref 13–44)
MACROCYTES BLD QL: SLIGHT — SIGNIFICANT CHANGE UP
MAGNESIUM SERPL-MCNC: 2 MG/DL — SIGNIFICANT CHANGE UP (ref 1.6–2.6)
MANUAL SMEAR VERIFICATION: SIGNIFICANT CHANGE UP
MCHC RBC-ENTMCNC: 24.4 PG — LOW (ref 27–34)
MCHC RBC-ENTMCNC: 32.5 GM/DL — SIGNIFICANT CHANGE UP (ref 32–36)
MCV RBC AUTO: 74.9 FL — LOW (ref 80–100)
MICROCYTES BLD QL: SLIGHT — SIGNIFICANT CHANGE UP
MONOCYTES # BLD AUTO: 0.53 K/UL — SIGNIFICANT CHANGE UP (ref 0–0.9)
MONOCYTES NFR BLD AUTO: 10.7 % — SIGNIFICANT CHANGE UP (ref 2–14)
NEUTROPHILS # BLD AUTO: 2.67 K/UL — SIGNIFICANT CHANGE UP (ref 1.8–7.4)
NEUTROPHILS NFR BLD AUTO: 54.4 % — SIGNIFICANT CHANGE UP (ref 43–77)
NITRITE UR-MCNC: NEGATIVE — SIGNIFICANT CHANGE UP
NON HDL CHOLESTEROL: 43 MG/DL — SIGNIFICANT CHANGE UP
NT-PROBNP SERPL-SCNC: 1683 PG/ML — HIGH (ref 0–300)
OVALOCYTES BLD QL SMEAR: SIGNIFICANT CHANGE UP
PH UR: 6.5 — SIGNIFICANT CHANGE UP (ref 5–8)
PLAT MORPH BLD: ABNORMAL
PLATELET # BLD AUTO: 198 K/UL — SIGNIFICANT CHANGE UP (ref 150–400)
POIKILOCYTOSIS BLD QL AUTO: SIGNIFICANT CHANGE UP
POTASSIUM SERPL-MCNC: 4.3 MMOL/L — SIGNIFICANT CHANGE UP (ref 3.5–5.3)
POTASSIUM SERPL-SCNC: 4.3 MMOL/L — SIGNIFICANT CHANGE UP (ref 3.5–5.3)
PROT SERPL-MCNC: 6.9 G/DL — SIGNIFICANT CHANGE UP (ref 6–8.3)
PROT UR-MCNC: NEGATIVE MG/DL — SIGNIFICANT CHANGE UP
PROTHROM AB SERPL-ACNC: 11.2 SEC — SIGNIFICANT CHANGE UP (ref 9.5–13)
RBC # BLD: 4.47 M/UL — SIGNIFICANT CHANGE UP (ref 4.2–5.8)
RBC # FLD: 17 % — HIGH (ref 10.3–14.5)
RBC BLD AUTO: ABNORMAL
RH IG SCN BLD-IMP: POSITIVE — SIGNIFICANT CHANGE UP
SCHISTOCYTES BLD QL AUTO: SLIGHT — SIGNIFICANT CHANGE UP
SMUDGE CELLS # BLD: PRESENT — SIGNIFICANT CHANGE UP
SODIUM SERPL-SCNC: 138 MMOL/L — SIGNIFICANT CHANGE UP (ref 135–145)
SP GR SPEC: 1.01 — SIGNIFICANT CHANGE UP (ref 1–1.03)
TRIGL SERPL-MCNC: 94 MG/DL — SIGNIFICANT CHANGE UP
TROPONIN T, HIGH SENSITIVITY RESULT: 19 NG/L — SIGNIFICANT CHANGE UP (ref 0–51)
TSH SERPL-MCNC: 0.65 UIU/ML — SIGNIFICANT CHANGE UP (ref 0.27–4.2)
UROBILINOGEN FLD QL: 0.2 MG/DL — SIGNIFICANT CHANGE UP (ref 0.2–1)
WBC # BLD: 4.91 K/UL — SIGNIFICANT CHANGE UP (ref 3.8–10.5)
WBC # FLD AUTO: 4.91 K/UL — SIGNIFICANT CHANGE UP (ref 3.8–10.5)

## 2024-07-28 PROCEDURE — 71045 X-RAY EXAM CHEST 1 VIEW: CPT | Mod: 26

## 2024-07-28 PROCEDURE — 93010 ELECTROCARDIOGRAM REPORT: CPT

## 2024-07-28 RX ORDER — INSULIN GLARGINE-YFGN 100 [IU]/ML
10 INJECTION, SOLUTION SUBCUTANEOUS EVERY MORNING
Refills: 0 | Status: DISCONTINUED | OUTPATIENT
Start: 2024-07-29 | End: 2024-07-31

## 2024-07-28 RX ORDER — ATORVASTATIN CALCIUM 40 MG/1
80 TABLET, FILM COATED ORAL AT BEDTIME
Refills: 0 | Status: DISCONTINUED | OUTPATIENT
Start: 2024-07-28 | End: 2024-07-31

## 2024-07-28 RX ORDER — DEXTROSE 4 G
25 TABLET,CHEWABLE ORAL ONCE
Refills: 0 | Status: DISCONTINUED | OUTPATIENT
Start: 2024-07-28 | End: 2024-07-31

## 2024-07-28 RX ORDER — ISOSORBIDE MONONITRATE 60 MG/1
30 TABLET, EXTENDED RELEASE ORAL DAILY
Refills: 0 | Status: DISCONTINUED | OUTPATIENT
Start: 2024-07-28 | End: 2024-07-31

## 2024-07-28 RX ORDER — DEXTROSE 4 G
12.5 TABLET,CHEWABLE ORAL ONCE
Refills: 0 | Status: DISCONTINUED | OUTPATIENT
Start: 2024-07-28 | End: 2024-07-31

## 2024-07-28 RX ORDER — METOPROLOL TARTRATE 100 MG
50 TABLET ORAL DAILY
Refills: 0 | Status: DISCONTINUED | OUTPATIENT
Start: 2024-07-29 | End: 2024-07-31

## 2024-07-28 RX ORDER — ASPIRIN 500 MG
81 TABLET ORAL DAILY
Refills: 0 | Status: DISCONTINUED | OUTPATIENT
Start: 2024-07-29 | End: 2024-07-31

## 2024-07-28 RX ORDER — PANTOPRAZOLE SODIUM 20 MG/1
40 TABLET, DELAYED RELEASE ORAL
Refills: 0 | Status: DISCONTINUED | OUTPATIENT
Start: 2024-07-28 | End: 2024-07-31

## 2024-07-28 RX ORDER — BACTERIOSTATIC SODIUM CHLORIDE 0.9 %
3 VIAL (ML) INJECTION EVERY 8 HOURS
Refills: 0 | Status: DISCONTINUED | OUTPATIENT
Start: 2024-07-28 | End: 2024-07-31

## 2024-07-28 RX ORDER — DEXTROSE MONOHYDRATE, SODIUM CHLORIDE, SODIUM LACTATE, CALCIUM CHLORIDE, MAGNESIUM CHLORIDE 1.5; 538; 448; 18.4; 5.08 G/100ML; MG/100ML; MG/100ML; MG/100ML; MG/100ML
1000 SOLUTION INTRAPERITONEAL
Refills: 0 | Status: DISCONTINUED | OUTPATIENT
Start: 2024-07-28 | End: 2024-07-31

## 2024-07-28 RX ORDER — HEPARIN SODIUM 1000 [USP'U]/ML
5000 INJECTION, SOLUTION INTRAVENOUS; SUBCUTANEOUS EVERY 8 HOURS
Refills: 0 | Status: DISCONTINUED | OUTPATIENT
Start: 2024-07-28 | End: 2024-07-31

## 2024-07-28 RX ORDER — GLUCAGON INJECTION, SOLUTION 0.5 MG/.1ML
1 INJECTION, SOLUTION SUBCUTANEOUS ONCE
Refills: 0 | Status: DISCONTINUED | OUTPATIENT
Start: 2024-07-28 | End: 2024-07-31

## 2024-07-28 RX ORDER — INSULIN LISPRO 100/ML
VIAL (ML) SUBCUTANEOUS
Refills: 0 | Status: DISCONTINUED | OUTPATIENT
Start: 2024-07-28 | End: 2024-07-31

## 2024-07-28 RX ORDER — DEXTROSE 4 G
15 TABLET,CHEWABLE ORAL ONCE
Refills: 0 | Status: DISCONTINUED | OUTPATIENT
Start: 2024-07-28 | End: 2024-07-31

## 2024-07-28 RX ORDER — SENNOSIDES 8.6 MG/1
2 TABLET ORAL AT BEDTIME
Refills: 0 | Status: DISCONTINUED | OUTPATIENT
Start: 2024-07-28 | End: 2024-07-31

## 2024-07-28 RX ADMIN — SENNOSIDES 2 TABLET(S): 8.6 TABLET ORAL at 21:06

## 2024-07-28 RX ADMIN — HEPARIN SODIUM 5000 UNIT(S): 1000 INJECTION, SOLUTION INTRAVENOUS; SUBCUTANEOUS at 21:05

## 2024-07-28 RX ADMIN — ATORVASTATIN CALCIUM 80 MILLIGRAM(S): 40 TABLET, FILM COATED ORAL at 21:06

## 2024-07-28 RX ADMIN — Medication 3 MILLILITER(S): at 22:57

## 2024-07-28 NOTE — H&P ADULT - NSHPPHYSICALEXAM_GEN_ALL_CORE
PHYSICAL EXAM:  General: well appearing sitting up in bed in NAD   HEENT: previous trach site well healed   Neurological: AOx3  Cardiovascular: Normal S1/S2. Regular rate/rhythm. No murmurs  Respiratory: Lungs CTA bilaterally. No wheezing or rales  Gastrointestinal: Non-distended. Soft. Non-tender  Extremities:  No edema. No calf tenderness.  Vascular: Radial 2+bilaterally, DP 2+ b/l  Incision Sites: previous PEG site well healed

## 2024-07-28 NOTE — PATIENT PROFILE ADULT - FALL HARM RISK - UNIVERSAL INTERVENTIONS
Bed in lowest position, wheels locked, appropriate side rails in place/Call bell, personal items and telephone in reach/Instruct patient to call for assistance before getting out of bed or chair/Non-slip footwear when patient is out of bed/[place] to call system/Physically safe environment - no spills, clutter or unnecessary equipment/Purposeful Proactive Rounding/Room/bathroom lighting operational, light cord in reach

## 2024-07-28 NOTE — H&P ADULT - HISTORY OF PRESENT ILLNESS
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 Ardon for surgical evaluation. Presents today for heart failure optimization and completion of pre-surgical testing in anticipation for OR. On arrival, pt feels well no complaints and Denies any chest pain, palpitations, orthopnea, dyspnea on exertion, shortness of breath, wheezing, abd pain, nausea, vomiting, constipation, lightheadedness, headaches, fevers, or chills.   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 Ardon for surgical evaluation. Presents today for heart failure optimization and completion of pre-surgical testing in anticipation for OR. On arrival, pt feels well no complaints and Denies any chest pain, palpitations, orthopnea, dyspnea on exertion, shortness of breath, wheezing, abd pain, nausea, vomiting, constipation, lightheadedness, headaches, fevers, or chills.

## 2024-07-28 NOTE — H&P ADULT - NSHPSOCIALHISTORY_GEN_ALL_CORE
no smoking, no ETOH, no illict drug use   no assistance devices for ambulation   lives at home with family, 3 steps in home

## 2024-07-28 NOTE — H&P ADULT - NSHPREVIEWOFSYSTEMS_GEN_ALL_CORE
Review of Systems  CONSTITUTIONAL:  Denies Fevers / chills, sweats, fatigue, weight loss, weight gain                                      NEURO:  Denies changes in sensation, seizures, syncope, confusion                                                                            EYES:  Denies Blurry vision, discharge, pain, loss of vision                                                                                    ENMT:  Denies Difficulty hearing, vertigo, dysphagia, epistaxis, recent dental work                                       CV:  Denies Chest pain, palpitations, PRECIADO, orthopnea                                                                                          RESPIRATORY:  Denies Wheezing, SOB, cough / sputum, hemoptysis                                                                GI:  Denies Nausea, vomiting, diarrhea, constipation, melena, difficulty swallowing                                               : Denies Hematuria, dysuria, urgency, incontinence                                                                                         MUSCULOSKELETAL:  Denies arthritis, joint swelling, muscle weakness                                                             SKIN/BREAST:  Denies rash, itching, hair loss, masses                                                                                            PSYCH:  Denies depression, anxiety, suicidal ideation                                                                                               HEME/LYMPH:  Denies bruises easily, enlarged lymph nodes, tender lymph nodes                                        ENDOCRINE:  Denies cold intolerance, heat intolerance, polydipsia

## 2024-07-28 NOTE — H&P ADULT - ASSESSMENT
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 Ardon for surgical evaluation. Presents today for heart failure optimization and completion of pre-surgical testing in anticipation for OR.     Plan:    Neurovascular:   -No pain  -hx of CVA: no residual deficits  -hx of retinal detachment, partial blindness left eye     Cardiovascular:   -preop OPCAB 7/31    -pre-surgical testing pending: TTE, Pa/lateral, EKG, PFTS, CT head/chest     -continue: met succ 50, Statin, Imdur 30, ASA  -HTN: continue with met succinate 50  -HLD: continue atorvastatin 80mg  -HFrEF (EF 25%): will need preoperative SG catheter for HF optimization +/- inotropes, needs ICU bed day before surgery    -Hemodynamically stable.   -Monitor: BP, HR, tele    Respiratory:   -Oxygenating well on room air  -Encourage continued use of IS 10x/hr and frequent ambulation  -CXR: pending pa/lateral     GI:  -GI PPX: pantoprazole 40mg daily   -PO Diet: consistent carb diet   -Bowel Regimen: senna     Renal / :  -BUN/Cr  -Monitor I/O's daily     Endocrine:    -A1c:  -TSH:    Hematologic:  -CBC: H/H-    ID:  -Temperature:   -CBC: WBC-    Prophylaxis:  -DVT prophylaxis with ____  -Continue with SCD's b/l while patient is at rest     Disposition:  -Discharge home once patient is medically ready   65 y/o M with PMHx HTN, HLD, HFrEF 25%, DM type II, 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 Ardon for surgical evaluation. Presents today for heart failure optimization and completion of pre-surgical testing in anticipation for OR.     Plan:    Neurovascular:   -No pain  -hx of CVA: no residual deficits  -hx of retinal detachment, partial blindness left eye     Cardiovascular:   -preop OPCAB 7/31    -pre-surgical testing pending: TTE, Pa/lateral, EKG, PFTS, CT head/chest     -continue: met succ 50, Statin, Imdur 30, ASA  -HTN: continue with met succinate 50  -HLD: continue atorvastatin 80mg  -HFrEF (EF 25%): will need preoperative SG catheter for HF optimization +/- inotropes, needs ICU bed day before surgery    -Hemodynamically stable.   -Monitor: BP, HR, tele    Respiratory:   -Oxygenating well on room air  -Encourage continued use of IS 10x/hr and frequent ambulation  -CXR: pending pa/lateral     GI:  -GI PPX: pantoprazole 40mg daily   -PO Diet: consistent carb diet   -Bowel Regimen: senna     Renal / :  -BUN/Cr 20/0.84  -Monitor I/O's daily     Endocrine:    -hx of DM type II    -on home regimen, well controlled    -Lantus 10 in MORNING and RISS for premeal  -A1c: 6.9  -TSH: 0.645    Hematologic:  -CBC: H/H- 10.9/33.5    ID:  -Temperature: afebrile   -CBC: WBC- 4.9    Prophylaxis:  -DVT prophylaxis with hep subQ  -Continue with SCD's b/l while patient is at rest     Disposition:  -preop OR pending

## 2024-07-29 ENCOUNTER — RESULT REVIEW (OUTPATIENT)
Age: 64
End: 2024-07-29

## 2024-07-29 LAB
ANION GAP SERPL CALC-SCNC: 7 MMOL/L — SIGNIFICANT CHANGE UP (ref 5–17)
BUN SERPL-MCNC: 20 MG/DL — SIGNIFICANT CHANGE UP (ref 7–23)
CALCIUM SERPL-MCNC: 8.8 MG/DL — SIGNIFICANT CHANGE UP (ref 8.4–10.5)
CHLORIDE SERPL-SCNC: 106 MMOL/L — SIGNIFICANT CHANGE UP (ref 96–108)
CO2 SERPL-SCNC: 27 MMOL/L — SIGNIFICANT CHANGE UP (ref 22–31)
CREAT SERPL-MCNC: 0.93 MG/DL — SIGNIFICANT CHANGE UP (ref 0.5–1.3)
EGFR: 93 ML/MIN/1.73M2 — SIGNIFICANT CHANGE UP
GLUCOSE BLDC GLUCOMTR-MCNC: 101 MG/DL — HIGH (ref 70–99)
GLUCOSE BLDC GLUCOMTR-MCNC: 112 MG/DL — HIGH (ref 70–99)
GLUCOSE BLDC GLUCOMTR-MCNC: 138 MG/DL — HIGH (ref 70–99)
GLUCOSE BLDC GLUCOMTR-MCNC: 176 MG/DL — HIGH (ref 70–99)
GLUCOSE SERPL-MCNC: 85 MG/DL — SIGNIFICANT CHANGE UP (ref 70–99)
HCT VFR BLD CALC: 35.9 % — LOW (ref 39–50)
HGB BLD-MCNC: 11.1 G/DL — LOW (ref 13–17)
MAGNESIUM SERPL-MCNC: 2 MG/DL — SIGNIFICANT CHANGE UP (ref 1.6–2.6)
MCHC RBC-ENTMCNC: 23.9 PG — LOW (ref 27–34)
MCHC RBC-ENTMCNC: 30.9 GM/DL — LOW (ref 32–36)
MCV RBC AUTO: 77.4 FL — LOW (ref 80–100)
NRBC # BLD: 0 /100 WBCS — SIGNIFICANT CHANGE UP (ref 0–0)
PLATELET # BLD AUTO: 177 K/UL — SIGNIFICANT CHANGE UP (ref 150–400)
POTASSIUM SERPL-MCNC: 3.8 MMOL/L — SIGNIFICANT CHANGE UP (ref 3.5–5.3)
POTASSIUM SERPL-SCNC: 3.8 MMOL/L — SIGNIFICANT CHANGE UP (ref 3.5–5.3)
RBC # BLD: 4.64 M/UL — SIGNIFICANT CHANGE UP (ref 4.2–5.8)
RBC # FLD: 16.9 % — HIGH (ref 10.3–14.5)
SODIUM SERPL-SCNC: 140 MMOL/L — SIGNIFICANT CHANGE UP (ref 135–145)
WBC # BLD: 4.28 K/UL — SIGNIFICANT CHANGE UP (ref 3.8–10.5)
WBC # FLD AUTO: 4.28 K/UL — SIGNIFICANT CHANGE UP (ref 3.8–10.5)

## 2024-07-29 PROCEDURE — 71046 X-RAY EXAM CHEST 2 VIEWS: CPT | Mod: 26

## 2024-07-29 PROCEDURE — 99223 1ST HOSP IP/OBS HIGH 75: CPT

## 2024-07-29 PROCEDURE — 71250 CT THORAX DX C-: CPT | Mod: 26

## 2024-07-29 PROCEDURE — 94010 BREATHING CAPACITY TEST: CPT | Mod: 26

## 2024-07-29 PROCEDURE — 70450 CT HEAD/BRAIN W/O DYE: CPT | Mod: 26

## 2024-07-29 PROCEDURE — 93306 TTE W/DOPPLER COMPLETE: CPT | Mod: 26

## 2024-07-29 RX ORDER — POTASSIUM CHLORIDE 1500 MG/1
20 TABLET, EXTENDED RELEASE ORAL ONCE
Refills: 0 | Status: COMPLETED | OUTPATIENT
Start: 2024-07-29 | End: 2024-07-29

## 2024-07-29 RX ADMIN — Medication 1: at 12:08

## 2024-07-29 RX ADMIN — Medication 50 MILLIGRAM(S): at 06:12

## 2024-07-29 RX ADMIN — HEPARIN SODIUM 5000 UNIT(S): 1000 INJECTION, SOLUTION INTRAVENOUS; SUBCUTANEOUS at 06:12

## 2024-07-29 RX ADMIN — Medication 3 MILLILITER(S): at 22:00

## 2024-07-29 RX ADMIN — INSULIN GLARGINE-YFGN 10 UNIT(S): 100 INJECTION, SOLUTION SUBCUTANEOUS at 07:12

## 2024-07-29 RX ADMIN — ISOSORBIDE MONONITRATE 30 MILLIGRAM(S): 60 TABLET, EXTENDED RELEASE ORAL at 11:39

## 2024-07-29 RX ADMIN — Medication 3 MILLILITER(S): at 13:02

## 2024-07-29 RX ADMIN — PANTOPRAZOLE SODIUM 40 MILLIGRAM(S): 20 TABLET, DELAYED RELEASE ORAL at 06:12

## 2024-07-29 RX ADMIN — HEPARIN SODIUM 5000 UNIT(S): 1000 INJECTION, SOLUTION INTRAVENOUS; SUBCUTANEOUS at 13:34

## 2024-07-29 RX ADMIN — ATORVASTATIN CALCIUM 80 MILLIGRAM(S): 40 TABLET, FILM COATED ORAL at 21:20

## 2024-07-29 RX ADMIN — Medication 3 MILLILITER(S): at 07:07

## 2024-07-29 RX ADMIN — POTASSIUM CHLORIDE 20 MILLIEQUIVALENT(S): 1500 TABLET, EXTENDED RELEASE ORAL at 07:37

## 2024-07-29 RX ADMIN — Medication 81 MILLIGRAM(S): at 11:39

## 2024-07-29 RX ADMIN — HEPARIN SODIUM 5000 UNIT(S): 1000 INJECTION, SOLUTION INTRAVENOUS; SUBCUTANEOUS at 21:20

## 2024-07-29 NOTE — CONSULT NOTE ADULT - SUBJECTIVE AND OBJECTIVE BOX
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 Ardon for surgical evaluation. Presents today for heart failure optimization and completion of pre-surgical testing in anticipation for OR. On arrival, pt feels well no complaints and Denies any chest pain, palpitations, orthopnea, dyspnea on exertion, shortness of breath, wheezing, abd pain, nausea, vomiting, constipation, lightheadedness, headaches, fevers, or chills.   (28 Jul 2024 16:09)      PAST MEDICAL & SURGICAL HISTORY:  DM (diabetes mellitus)      Cardiac arrest      HTN (hypertension)      HLD (hyperlipidemia)      Status post insertion of percutaneous endoscopic gastrostomy (PEG) tube          FAMILY HISTORY:      SOCIAL HISTORY:    [ ] Non-smoker  [ ] Smoker  [ ] Alcohol    MEDICATIONS  (STANDING):  aspirin enteric coated 81 milliGRAM(s) Oral daily  atorvastatin 80 milliGRAM(s) Oral at bedtime  dextrose 5%. 1000 milliLiter(s) (50 mL/Hr) IV Continuous <Continuous>  dextrose 5%. 1000 milliLiter(s) (100 mL/Hr) IV Continuous <Continuous>  dextrose 50% Injectable 25 Gram(s) IV Push once  dextrose 50% Injectable 12.5 Gram(s) IV Push once  dextrose 50% Injectable 25 Gram(s) IV Push once  glucagon  Injectable 1 milliGRAM(s) IntraMuscular once  heparin   Injectable 5000 Unit(s) SubCutaneous every 8 hours  insulin glargine Injectable (LANTUS) 10 Unit(s) SubCutaneous every morning  insulin lispro (ADMELOG) corrective regimen sliding scale   SubCutaneous Before meals and at bedtime  isosorbide   mononitrate ER Tablet (IMDUR) 30 milliGRAM(s) Oral daily  metoprolol succinate ER 50 milliGRAM(s) Oral daily  pantoprazole    Tablet 40 milliGRAM(s) Oral before breakfast  senna 2 Tablet(s) Oral at bedtime  sodium chloride 0.9% lock flush 3 milliLiter(s) IV Push every 8 hours    MEDICATIONS  (PRN):  dextrose Oral Gel 15 Gram(s) Oral once PRN Blood Glucose LESS THAN 70 milliGRAM(s)/deciliter    Home Medications:  Jardiance 25 mg oral tablet: 1 tab(s) orally once a day (28 Jul 2024 16:19)  Lantus 100 units/mL subcutaneous solution: 10 unit(s) subcutaneous once a day in the AM (28 Jul 2024 16:17)  Metoprolol Succinate ER 50 mg oral tablet, extended release: 1 tab(s) orally once a day (28 Jul 2024 16:17)  nitroglycerin 0.4 mg sublingual tablet: 1 tab(s) sublingually every 5 minutes as needed for  chest pain (28 Jul 2024 16:19)      ALLERGIES:  No Known Allergies      REVIEW OF SYSTEMS:  Constitutional: [ ] Fever [ ] Chills [ ] Fatigue [ ] Weight change   HEENT: [ ] Headache [ ] Vision changes [ ] Eye pain [ ] Difficulty Hearing [ ] Tinnitus [ ] Vertigo [ ] Runny nose [ ] Sore throat   Respiratory: [ ] Cough [ ] Wheezing [ ] Shortness of breath [ ] Hemoptysis  Cardiovascular: [ ] Chest Pain [ ] Palpitations [ ] PRECIADO [ ] PND [ ] Orthopnea [ ] Leg swelling  Gastrointestinal:  [ ] Nausea [ ] Vomiting [ ] Abdominal Pain [ ] Diarrhea [ ] Constipation [ ] Melena [ ] Hematochezia  Genitourinary: [ ] Nocturia [ ] Dysuria [ ] Incontinence  Musculoskeletal: [ ] Joint pain [ ] Joint swelling [ ] Muscle pain  Neurologic: [ ] Focal deficit [ ] Paresthesias [ ] Tremors [ ] Memory loss  Hematologic: [ ] Easy bruising  Endocrine: [ ] Cold intolerance [ ] Heat intolerance  Lymphatic: [ ] Swelling [ ] Lymphadenopathy   Skin: [ ] Rash [ ] Itching [ ] Hives [ ] Ecchymoses [ ] Wounds [ ] Lesions   Psychiatry: [ ] Depression [ ] Suicidal/Homicidal Ideation [ ] Anxiety [ ] Sleep Disturbances  [ ] 10 point review of systems is otherwise negative except as mentioned above              [ ] Unable to obtain    Vital Signs Last 24 Hrs  T(C): 36.2 (29 Jul 2024 13:45), Max: 36.8 (28 Jul 2024 22:22)  T(F): 97.2 (29 Jul 2024 13:45), Max: 98.2 (28 Jul 2024 22:22)  HR: 67 (29 Jul 2024 13:39) (61 - 86)  BP: 99/56 (29 Jul 2024 13:39) (99/56 - 126/66)  BP(mean): 74 (29 Jul 2024 13:39) (74 - 88)  RR: 16 (29 Jul 2024 13:39) (16 - 18)  SpO2: 100% (29 Jul 2024 13:39) (97% - 100%)    Parameters below as of 29 Jul 2024 13:39  Patient On (Oxygen Delivery Method): room air        I&O's Summary    28 Jul 2024 07:01  -  29 Jul 2024 07:00  --------------------------------------------------------  IN: 120 mL / OUT: 2450 mL / NET: -2330 mL        PHYSICAL EXAM:  Appearance: Normal	  HEENT: Normal oral mucosa, PERRL, EOMI	  Lymphatic: No lymphadenopathy  Cardiovascular: Normal S1 S2, No JVD, No murmurs, No edema  Respiratory: Lungs clear to auscultation	  Psychiatry: A & O x 3, Mood & affect appropriate  Gastrointestinal:  Soft, Non-tender, + BS	  Skin: No rashes, No ecchymoses, No cyanosis	  Neurologic: Non-focal  Extremities: Normal range of motion, No clubbing, cyanosis or edema  Vascular: Peripheral pulses palpable 2+ bilaterally    LABS:	 	                        11.1   4.28  )-----------( 177      ( 29 Jul 2024 06:07 )             35.9     07-29    140  |  106  |  20  ----------------------------<  85  3.8   |  27  |  0.93    Ca    8.8      29 Jul 2024 06:07  Mg     2.0     07-29    TPro  6.9  /  Alb  4.3  /  TBili  0.3  /  DBili  x   /  AST  31  /  ALT  24  /  AlkPhos  94  07-28    PT/INR - ( 28 Jul 2024 14:55 )   PT: 11.2 sec;   INR: 0.98          PTT - ( 28 Jul 2024 14:55 )  PTT:32.3 sec        proBNP:   Lipid Profile:   HgA1c:   TSH:     CARDIOVASCULAR DIAGNOSTIC TESTING:  Telemetry:  ECG:    [ ] Echocardiogram:  [ ] Stress Test:  [ ] Catheterization:    RADIOLOGY:

## 2024-07-29 NOTE — PROGRESS NOTE ADULT - SUBJECTIVE AND OBJECTIVE BOX
Patient discussed on morning rounds with Dr. Jimenez    Operation / Date: Pre-op    SUBJECTIVE ASSESSMENT: Patient seen and examined at bedside. Patient states that he feels well, denies chest pain, SOB, palpitations, N/V.     Vital Signs Last 24 Hrs  T(C): 36.6 (29 Jul 2024 09:19), Max: 36.8 (28 Jul 2024 22:22)  T(F): 97.8 (29 Jul 2024 09:19), Max: 98.2 (28 Jul 2024 22:22)  HR: 67 (29 Jul 2024 13:39) (61 - 86)  BP: 99/56 (29 Jul 2024 13:39) (99/56 - 127/59)  BP(mean): 74 (29 Jul 2024 13:39) (74 - 88)  RR: 16 (29 Jul 2024 13:39) (16 - 18)  SpO2: 100% (29 Jul 2024 13:39) (97% - 100%)    Parameters below as of 29 Jul 2024 13:39  Patient On (Oxygen Delivery Method): room air    I&O's Detail    28 Jul 2024 07:01  -  29 Jul 2024 07:00  --------------------------------------------------------  IN:    Oral Fluid: 120 mL  Total IN: 120 mL    OUT:    Voided (mL): 2450 mL  Total OUT: 2450 mL    Total NET: -2330 mL    CHEST TUBE:  None  KENISHA DRAIN:  None  EPICARDIAL WIRES: None  TIE DOWNS: None  GILBERT: None    PHYSICAL EXAM:  GENERAL: NAD, lying in bed comfortably  HEAD:  Atraumatic, Normocephalic  EYES: EOMI, PERRLA, conjunctiva and sclera clear  ENT: Moist mucous membranes  NECK: Supple, No JVD  CHEST/LUNG: CTAB  HEART: RRR  ABDOMEN: Soft, Nontender, Nondistended. No hepatomegally  EXTREMITIES:  2+ Peripheral Pulses, brisk capillary refill. No clubbing, cyanosis, or edema  NERVOUS SYSTEM:  Alert & Oriented X3, speech clear. No deficits     LABS:                        11.1   4.28  )-----------( 177      ( 29 Jul 2024 06:07 )             35.9     PT/INR - ( 28 Jul 2024 14:55 )   PT: 11.2 sec;   INR: 0.98       PTT - ( 28 Jul 2024 14:55 )  PTT:32.3 sec    07-29    140  |  106  |  20  ----------------------------<  85  3.8   |  27  |  0.93    Ca    8.8      29 Jul 2024 06:07  Mg     2.0     07-29    TPro  6.9  /  Alb  4.3  /  TBili  0.3  /  DBili  x   /  AST  31  /  ALT  24  /  AlkPhos  94  07-28    Urinalysis Basic - ( 29 Jul 2024 06:07 )    Color: x / Appearance: x / SG: x / pH: x  Gluc: 85 mg/dL / Ketone: x  / Bili: x / Urobili: x   Blood: x / Protein: x / Nitrite: x   Leuk Esterase: x / RBC: x / WBC x   Sq Epi: x / Non Sq Epi: x / Bacteria: x    MEDICATIONS  (STANDING):  aspirin enteric coated 81 milliGRAM(s) Oral daily  atorvastatin 80 milliGRAM(s) Oral at bedtime  dextrose 5%. 1000 milliLiter(s) (50 mL/Hr) IV Continuous <Continuous>  dextrose 5%. 1000 milliLiter(s) (100 mL/Hr) IV Continuous <Continuous>  dextrose 50% Injectable 25 Gram(s) IV Push once  dextrose 50% Injectable 25 Gram(s) IV Push once  dextrose 50% Injectable 12.5 Gram(s) IV Push once  glucagon  Injectable 1 milliGRAM(s) IntraMuscular once  heparin   Injectable 5000 Unit(s) SubCutaneous every 8 hours  insulin glargine Injectable (LANTUS) 10 Unit(s) SubCutaneous every morning  insulin lispro (ADMELOG) corrective regimen sliding scale   SubCutaneous Before meals and at bedtime  isosorbide   mononitrate ER Tablet (IMDUR) 30 milliGRAM(s) Oral daily  metoprolol succinate ER 50 milliGRAM(s) Oral daily  pantoprazole    Tablet 40 milliGRAM(s) Oral before breakfast  senna 2 Tablet(s) Oral at bedtime  sodium chloride 0.9% lock flush 3 milliLiter(s) IV Push every 8 hours    MEDICATIONS  (PRN):  dextrose Oral Gel 15 Gram(s) Oral once PRN Blood Glucose LESS THAN 70 milliGRAM(s)/deciliter    RADIOLOGY & ADDITIONAL TESTS:  < from: CT Head No Cont (07.29.24 @ 10:38) >  FINDINGS:    VENTRICLES AND SULCI: Again demonstrated is ex vacuo dilatation of the   right occipital horn. There is no hydrocephalus.  INTRA-AXIAL: No mass effect, acute hemorrhage, or midline shift.  There   is a chronic right occipital infarction.  EXTRA-AXIAL: No mass or fluid collection. Basal cisterns are normal in   appearance.    VISUALIZED SINUSES:  Clear.  TYMPANOMASTOID CAVITIES:  Clear.  VISUALIZED ORBITS: Again demonstrated is hyperdense material in the left   orbit likely from intraocular silicone.  CALVARIUM: Intact.    IMPRESSION:  No acute intracranial hemorrhage, mass effect, or new demarcated   territorial infarction. Stable chronic right occipital infarction.    < from: CT Chest No Cont (07.29.24 @ 10:38) >  FINDINGS:    LUNGS AND LARGE AIRWAYS: Patent central airways. No pulmonary mass or   consolidation. No suspicious nodules. Nochange few calcified granulomas   in the right lung, which may represent sequela of previous granulomatous   disease. Again scattered subsegmental atelectasis most notable in the   middle lobe and lingula. Paraspinal groundglass opacification in the   right lower lobe posterior segment likely related to adjacent   osteophytes. Mild biapical scarring.Subtle thickening of interlobular   septa which can be seen in pulmonary interstitial edema..      PLEURA: Interval resolution of prior bilateral pleural effusions.    VESSELS: No change in the dimensions of the ascending thoracic  aorta   measuring 3.8 cm AP by 3.8 cm transverse. Not aneurysmal. Remainder of   the thoracic aorta is normal in caliber. Mild atherosclerotic changes.    HEART: Heart size is normal. No pericardial effusion. Redemonstrated   coronary artery calcification, moderate to severe in the LAD territory.    MEDIASTINUM AND MARILYN: No change calcified mediastinal and right hilar   lymph nodes, possible sequelae of prior granulomatous disease or sarcoid.    CHEST WALL AND LOWER NECK: Mild bilateral gynecomastia.    VISUALIZED UPPER ABDOMEN: Partially included left upper pole renal cyst   and few colonic diverticuli.    BONES: Degenerative changes. Prominent anterolateral osteophytes of the   mid thoracic spine.    IMPRESSION:  Since 5/31/2024, no change in dimensions of the ascending thoracic aorta.  Moderate to severe coronary calcification along the LAD distribution.  Resolution of the prior small bilateral pleural effusions.

## 2024-07-29 NOTE — CONSULT NOTE ADULT - ASSESSMENT
63 YO M with a history of ACC/AHA Stage C ICM (LV 5.4 cm, LVEF 30-35%), moderate cpcPH, prior CVA, well controlled DM2, and known severe 3v CAD who presents for elective CABG. He was transferred from [facility] 6/2024 for surgical evaluation where plan was to pursue as outpatient and since then has tolerated GDMT with improvement but normalization in BNP levels.     He is euvolemic on exam and by non-invasive hemodynamics. He had a RHC last month with preserved cardiac output and his clinical HF status has improved since then with NYHA 1 symptoms. He is not tachycardic/hypotensive and LVOT VTI is greater than 15 with LVEF > 25%. He is optimized for CABG and based on these findings will defer pre-op RHC.     REVIEW OF STUDIES  TTE 7/29/2024: LV 5.4 cm, LVEF 30-35%, LVOT VTI 16 cm, normal RV size/function, mild AI, PASP 60 mmHg, IVC small/collapsing  LHC 6/2024: severe 3v CAD with LM involvement  RHC 6/2024: RA 5, PA 59/22 (35), PCWP 17, Iris CO/CI 5.1/2.8, PVR 3.6  Cardiac MRI 6/2024: LVEF 26%, RVEF 54%, subtle subendocardial LGE     PLAN  # Chronic systolic heart failure  - Etiology: ischemic, pending revascularization  - GDMT: reasonable to continue BB/imdur, will aim to reinitiate GDMT including ARNI eventually post-op  - Diuretics: euvolemic off diuretics  - Device: premature     # CAD  - Pending CABG, as above will defer pre-op RHC given hemodynamics last month and reassuring noninvasive hemodynamics on TTE today     # Pulmonary hypertension  - Hemodynamics suggest moderate combined pre and post-capillary pulmonary hypertension, suspect some underlying lung disease present

## 2024-07-29 NOTE — PROGRESS NOTE ADULT - ASSESSMENT
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 Ardon for surgical evaluation. Presents today for heart failure optimization and completion of pre-surgical testing in anticipation for OR. On arrival, pt feels well no complaints and Denies any chest pain, palpitations, orthopnea, dyspnea on exertion, shortness of breath, wheezing, abd pain, nausea, vomiting, constipation, lightheadedness, headaches, fevers, or chills.    Plan:    Neurovascular:   -Pain well controlled with current regimen. PRN's:    Cardiovascular:   -Hemodynamically stable.   -Monitor: BP, HR, tele    Respiratory:   -Oxygenating well on room air  -Encourage continued use of IS 10x/hr and frequent ambulation  -CXR:    GI:  -GI PPX:  -PO Diet  -Bowel Regimen:     Renal / :  -Continue to monitor renal function: BUN/Cr  -Monitor I/O's daily     Endocrine:    -No hx of DM or thyroid dx  -A1c:  -TSH:    Hematologic:  -CBC: H/H-  -Coagulation Panel.    ID:  -Temperature:   -CBC: WBC-    Prophylaxis:  -DVT prophylaxis with 5000 SubQ Heparin q8h.  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 He was referred to Dr. Ardon for surgical evaluation and presented to [facility] on 7/28 for heart failure optimization and pre-surgical testing. HF team consulted and PST underway. Plan for OPCAB on Wednesday 7/31.     Plan:    Neurovascular:   -Hx of CVA (no deficits)  -CT head stable (results above)  -Pain well controlled with current regimen.     Cardiovascular:   -Multivessel CAD  -Cont ASA, BB, Imdur and statin  -OR Wednesday for OPCAB  -HFrEF  -EF 30-35% on TTE today  -Per HF, no indication for RHC/pre-op swan at this time  -Hx of HTN  -Cont BB  -Hx of HLD  -Cont statin  -Hemodynamically stable.   -Monitor: BP, HR, tele    Respiratory:   -Oxygenating well on room air  -Encourage continued use of IS 10x/hr and frequent ambulation  -CXR: stable    GI:  -GI PPX: Protonix  -PO Diet  -Bowel Regimen: Senna     Renal / :  -Continue to monitor renal function: BUN/Cr: 20/0.93  -Monitor I/O's daily     Endocrine:    -Hx of DMII  -A1c: 6.9  -Cont ISS and lantus  -No hx of thyroid dx  -TSH: 0.645    Hematologic:  -CBC: H/H- 11/35.9  -Coagulation Panel.    ID:  -Temperature: Afebrile  -CBC: WBC- 4.28    Prophylaxis:  -DVT prophylaxis with 5000 SubQ Heparin q8h.  -Continue with SCD's b/l while patient is at rest     Disposition:  -OR Wednesday

## 2024-07-30 ENCOUNTER — TRANSCRIPTION ENCOUNTER (OUTPATIENT)
Age: 64
End: 2024-07-30

## 2024-07-30 LAB
ANION GAP SERPL CALC-SCNC: 9 MMOL/L — SIGNIFICANT CHANGE UP (ref 5–17)
BUN SERPL-MCNC: 20 MG/DL — SIGNIFICANT CHANGE UP (ref 7–23)
CALCIUM SERPL-MCNC: 9.2 MG/DL — SIGNIFICANT CHANGE UP (ref 8.4–10.5)
CHLORIDE SERPL-SCNC: 106 MMOL/L — SIGNIFICANT CHANGE UP (ref 96–108)
CO2 SERPL-SCNC: 27 MMOL/L — SIGNIFICANT CHANGE UP (ref 22–31)
CREAT SERPL-MCNC: 0.96 MG/DL — SIGNIFICANT CHANGE UP (ref 0.5–1.3)
EGFR: 89 ML/MIN/1.73M2 — SIGNIFICANT CHANGE UP
GLUCOSE BLDC GLUCOMTR-MCNC: 102 MG/DL — HIGH (ref 70–99)
GLUCOSE BLDC GLUCOMTR-MCNC: 132 MG/DL — HIGH (ref 70–99)
GLUCOSE BLDC GLUCOMTR-MCNC: 152 MG/DL — HIGH (ref 70–99)
GLUCOSE BLDC GLUCOMTR-MCNC: 183 MG/DL — HIGH (ref 70–99)
GLUCOSE SERPL-MCNC: 115 MG/DL — HIGH (ref 70–99)
HCT VFR BLD CALC: 37.4 % — LOW (ref 39–50)
HGB BLD-MCNC: 11.5 G/DL — LOW (ref 13–17)
MAGNESIUM SERPL-MCNC: 1.9 MG/DL — SIGNIFICANT CHANGE UP (ref 1.6–2.6)
MCHC RBC-ENTMCNC: 23.7 PG — LOW (ref 27–34)
MCHC RBC-ENTMCNC: 30.7 GM/DL — LOW (ref 32–36)
MCV RBC AUTO: 77.1 FL — LOW (ref 80–100)
NRBC # BLD: 0 /100 WBCS — SIGNIFICANT CHANGE UP (ref 0–0)
PLATELET # BLD AUTO: 180 K/UL — SIGNIFICANT CHANGE UP (ref 150–400)
POTASSIUM SERPL-MCNC: 4.5 MMOL/L — SIGNIFICANT CHANGE UP (ref 3.5–5.3)
POTASSIUM SERPL-SCNC: 4.5 MMOL/L — SIGNIFICANT CHANGE UP (ref 3.5–5.3)
RBC # BLD: 4.85 M/UL — SIGNIFICANT CHANGE UP (ref 4.2–5.8)
RBC # FLD: 17.2 % — HIGH (ref 10.3–14.5)
SODIUM SERPL-SCNC: 142 MMOL/L — SIGNIFICANT CHANGE UP (ref 135–145)
WBC # BLD: 5.29 K/UL — SIGNIFICANT CHANGE UP (ref 3.8–10.5)
WBC # FLD AUTO: 5.29 K/UL — SIGNIFICANT CHANGE UP (ref 3.8–10.5)

## 2024-07-30 RX ORDER — CHLORHEXIDINE GLUCONATE 500 MG/1
1 CLOTH TOPICAL ONCE
Refills: 0 | Status: COMPLETED | OUTPATIENT
Start: 2024-07-30 | End: 2024-07-30

## 2024-07-30 RX ORDER — MUPIROCIN CALCIUM 20 MG/G
1 CREAM TOPICAL EVERY 12 HOURS
Refills: 0 | Status: DISCONTINUED | OUTPATIENT
Start: 2024-07-30 | End: 2024-07-31

## 2024-07-30 RX ORDER — MAGNESIUM OXIDE 253 MG/1
800 TABLET ORAL ONCE
Refills: 0 | Status: COMPLETED | OUTPATIENT
Start: 2024-07-30 | End: 2024-07-30

## 2024-07-30 RX ORDER — CHLORHEXIDINE GLUCONATE 500 MG/1
15 CLOTH TOPICAL ONCE
Refills: 0 | Status: COMPLETED | OUTPATIENT
Start: 2024-07-30 | End: 2024-07-31

## 2024-07-30 RX ORDER — CHLORHEXIDINE GLUCONATE 500 MG/1
1 CLOTH TOPICAL ONCE
Refills: 0 | Status: COMPLETED | OUTPATIENT
Start: 2024-07-31 | End: 2024-07-31

## 2024-07-30 RX ORDER — LYSINE HCL 500 MG
2000 TABLET ORAL ONCE
Refills: 0 | Status: COMPLETED | OUTPATIENT
Start: 2024-07-30 | End: 2024-07-31

## 2024-07-30 RX ORDER — ACETAMINOPHEN 500 MG
1000 TABLET ORAL ONCE
Refills: 0 | Status: COMPLETED | OUTPATIENT
Start: 2024-07-31 | End: 2024-07-31

## 2024-07-30 RX ADMIN — Medication 50 MILLIGRAM(S): at 05:30

## 2024-07-30 RX ADMIN — MUPIROCIN CALCIUM 1 APPLICATION(S): 20 CREAM TOPICAL at 19:32

## 2024-07-30 RX ADMIN — Medication 3 MILLILITER(S): at 05:20

## 2024-07-30 RX ADMIN — CHLORHEXIDINE GLUCONATE 1 APPLICATION(S): 500 CLOTH TOPICAL at 19:31

## 2024-07-30 RX ADMIN — MAGNESIUM OXIDE 800 MILLIGRAM(S): 253 TABLET ORAL at 09:41

## 2024-07-30 RX ADMIN — ISOSORBIDE MONONITRATE 30 MILLIGRAM(S): 60 TABLET, EXTENDED RELEASE ORAL at 11:42

## 2024-07-30 RX ADMIN — Medication 81 MILLIGRAM(S): at 11:42

## 2024-07-30 RX ADMIN — Medication 3 MILLILITER(S): at 21:02

## 2024-07-30 RX ADMIN — HEPARIN SODIUM 5000 UNIT(S): 1000 INJECTION, SOLUTION INTRAVENOUS; SUBCUTANEOUS at 05:31

## 2024-07-30 RX ADMIN — INSULIN GLARGINE-YFGN 10 UNIT(S): 100 INJECTION, SOLUTION SUBCUTANEOUS at 07:35

## 2024-07-30 RX ADMIN — Medication 1: at 21:27

## 2024-07-30 RX ADMIN — Medication 1: at 11:41

## 2024-07-30 RX ADMIN — HEPARIN SODIUM 5000 UNIT(S): 1000 INJECTION, SOLUTION INTRAVENOUS; SUBCUTANEOUS at 21:25

## 2024-07-30 RX ADMIN — ATORVASTATIN CALCIUM 80 MILLIGRAM(S): 40 TABLET, FILM COATED ORAL at 21:25

## 2024-07-30 RX ADMIN — PANTOPRAZOLE SODIUM 40 MILLIGRAM(S): 20 TABLET, DELAYED RELEASE ORAL at 05:31

## 2024-07-30 RX ADMIN — HEPARIN SODIUM 5000 UNIT(S): 1000 INJECTION, SOLUTION INTRAVENOUS; SUBCUTANEOUS at 13:13

## 2024-07-30 RX ADMIN — CHLORHEXIDINE GLUCONATE 1 APPLICATION(S): 500 CLOTH TOPICAL at 21:53

## 2024-07-30 RX ADMIN — Medication 3 MILLILITER(S): at 13:10

## 2024-07-30 NOTE — PROGRESS NOTE ADULT - ASSESSMENT
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 He was referred to Dr. Ardon for surgical evaluation and presented to [facility] on 7/28 for heart failure optimization and pre-surgical testing. HF team consulted and PST underway. Plan for OPCAB on Wednesday 7/31.     Plan:    Neurovascular:   -Hx of CVA (no deficits)  -CT head stable (results above)  -Pain well controlled with current regimen.     Cardiovascular:   -Multivessel CAD  -Cont ASA, BB, Imdur and statin  -OR Wednesday for OPCAB  -HFrEF  -EF 30-35% on TTE   -Per HF, no indication for RHC/pre-op swan at this time  -Hx of HTN  -Cont BB  -Hx of HLD  -Cont statin  -Hemodynamically stable.   -Monitor: BP, HR, tele    Respiratory:   -Oxygenating well on room air  -Encourage continued use of IS 10x/hr and frequent ambulation  -CXR: stable    GI:  -GI PPX: Protonix  -PO Diet  -Bowel Regimen: Senna     Renal / :  -Continue to monitor renal function: BUN/Cr: 20/0.96  -Monitor I/O's daily     Endocrine:    -Hx of DMII  -A1c: 6.9  -Cont ISS and lantus  -No hx of thyroid dx  -TSH: 0.645    Hematologic:  -CBC: H/H-11.5/37.4  -Coagulation Panel.    ID:  -Temperature: Afebrile  -CBC: WBC- 5.29    Prophylaxis:  -DVT prophylaxis with 5000 SubQ Heparin q8h.  -Continue with SCD's b/l while patient is at rest     Disposition:  -OR Wednesday

## 2024-07-30 NOTE — PROGRESS NOTE ADULT - SUBJECTIVE AND OBJECTIVE BOX
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 He was referred to Dr. Ardon for surgical evaluation and presented to [facility] on 7/28 for heart failure optimization and pre-surgical testing. HF team consulted and PST underway. Plan for OPCAB on Wednesday 7/31.     PAST MEDICAL & SURGICAL HISTORY:  DM (diabetes mellitus)      Cardiac arrest      HTN (hypertension)      HLD (hyperlipidemia)      Status post insertion of percutaneous endoscopic gastrostomy (PEG) tube          No Known Allergies      Physical Exam  T(C): 36.4 (07-30-24 @ 09:00), Max: 36.6 (07-29-24 @ 21:09)  HR: 97 (07-30-24 @ 11:54) (64 - 97)  BP: 141/78 (07-30-24 @ 11:52) (98/52 - 149/85)  RR: 18 (07-30-24 @ 09:00) (16 - 18)  SpO2: 97% (07-30-24 @ 09:00) (97% - 100%)  GENERAL: NAD, lying in bed comfortably  HEAD:  Atraumatic, Normocephalic  EYES: EOMI, PERRLA, conjunctiva and sclera clear  ENT: Moist mucous membranes  NECK: Supple, No JVD  CHEST/LUNG: CTAB  HEART: RRR  ABDOMEN: Soft, Nontender, Nondistended. No hepatomegally  EXTREMITIES:  2+ Peripheral Pulses, brisk capillary refill. No clubbing, cyanosis, or edema  NERVOUS SYSTEM:  Alert & Oriented X3, speech clear. No deficits       MEDICATIONS  (STANDING):  ascorbic acid 2000 milliGRAM(s) Oral once  aspirin enteric coated 81 milliGRAM(s) Oral daily  atorvastatin 80 milliGRAM(s) Oral at bedtime  chlorhexidine 0.12% Liquid 15 milliLiter(s) Swish and Spit once  chlorhexidine 4% Liquid 1 Application(s) Topical once  chlorhexidine 4% Liquid 1 Application(s) Topical once  dextrose 5%. 1000 milliLiter(s) (100 mL/Hr) IV Continuous <Continuous>  dextrose 5%. 1000 milliLiter(s) (50 mL/Hr) IV Continuous <Continuous>  dextrose 50% Injectable 25 Gram(s) IV Push once  dextrose 50% Injectable 12.5 Gram(s) IV Push once  dextrose 50% Injectable 25 Gram(s) IV Push once  glucagon  Injectable 1 milliGRAM(s) IntraMuscular once  heparin   Injectable 5000 Unit(s) SubCutaneous every 8 hours  insulin glargine Injectable (LANTUS) 10 Unit(s) SubCutaneous every morning  insulin lispro (ADMELOG) corrective regimen sliding scale   SubCutaneous Before meals and at bedtime  isosorbide   mononitrate ER Tablet (IMDUR) 30 milliGRAM(s) Oral daily  metoprolol succinate ER 50 milliGRAM(s) Oral daily  mupirocin 2% Nasal 1 Application(s) Both Nostrils every 12 hours  pantoprazole    Tablet 40 milliGRAM(s) Oral before breakfast  senna 2 Tablet(s) Oral at bedtime  sodium chloride 0.9% lock flush 3 milliLiter(s) IV Push every 8 hours    MEDICATIONS  (PRN):  dextrose Oral Gel 15 Gram(s) Oral once PRN Blood Glucose LESS THAN 70 milliGRAM(s)/deciliter      On Beta Blocker? YES  Labs:                        11.5   5.29  )-----------( 180      ( 30 Jul 2024 07:11 )             37.4     07-30    142  |  106  |  20  ----------------------------<  115<H>  4.5   |  27  |  0.96    Ca    9.2      30 Jul 2024 07:11  Mg     1.9     07-30    TPro  6.9  /  Alb  4.3  /  TBili  0.3  /  DBili  x   /  AST  31  /  ALT  24  /  AlkPhos  94  07-28    PT/INR - ( 28 Jul 2024 14:55 )   PT: 11.2 sec;   INR: 0.98          PTT - ( 28 Jul 2024 14:55 )  PTT:32.3 sec  Urinalysis Basic - ( 30 Jul 2024 07:11 )    Color: x / Appearance: x / SG: x / pH: x  Gluc: 115 mg/dL / Ketone: x  / Bili: x / Urobili: x   Blood: x / Protein: x / Nitrite: x   Leuk Esterase: x / RBC: x / WBC x   Sq Epi: x / Non Sq Epi: x / Bacteria: x      ABO Interpretation: O (07-28-24 @ 15:44)              Hgb A1C: 6.9    EKG: NSR    CXR: c< from: Xray Chest 2 Views PA/Lat (07.29.24 @ 09:56) >  Heart, lungs and mediastinum are unremarkable. Thoracic spine   degenerative changes.    < end of copied text >      CT Scans:< from: CT Chest No Cont (07.29.24 @ 10:38) >  Since 5/31/2024, no change in dimensions of the ascending thoracic aorta.  Moderate to severe coronary calcification along the LAD distribution.  Resolution of the prior small bilateral pleural effusions.    < end of copied text >      Cath Report:     Echo:  1. Severely reduced left ventricular systolic function.   2. Regional wall motion abnormalities consistent with ischemic heart   disease.   3. Normal right ventricular size and systolic function.   4. Normal atria.   5. Mild-to-moderate aortic regurgitation.   6. Mild mitral regurgitation.   7. Mild tricuspid regurgitation.   8. Pulmonary hypertension present, pulmonary artery systolic pressure is   60 mmHg.   9. No pericardial effusion.  10. Compared to the previous TTE performed on 6/5/2024, there is evidence   of pulmonary HTN in this study.    PFT's:    Carotid Duplex:    Consult in Chart?  YES / NO  Consent in Chart? YES / NO  Pre-op Orders Placed? YES / NO  Blood Products Ordered? YES / NO  NPO ordered? 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 He was referred to Dr. Ardon for surgical evaluation and presented to [facility] on 7/28 for heart failure optimization and pre-surgical testing. HF team consulted and PST underway. Plan for OPCAB on Wednesday 7/31.     PAST MEDICAL & SURGICAL HISTORY:  DM (diabetes mellitus)      Cardiac arrest      HTN (hypertension)      HLD (hyperlipidemia)      Status post insertion of percutaneous endoscopic gastrostomy (PEG) tube          No Known Allergies      Physical Exam  T(C): 36.4 (07-30-24 @ 09:00), Max: 36.6 (07-29-24 @ 21:09)  HR: 97 (07-30-24 @ 11:54) (64 - 97)  BP: 141/78 (07-30-24 @ 11:52) (98/52 - 149/85)  RR: 18 (07-30-24 @ 09:00) (16 - 18)  SpO2: 97% (07-30-24 @ 09:00) (97% - 100%)  GENERAL: NAD, lying in bed comfortably  HEAD:  Atraumatic, Normocephalic  EYES: EOMI, PERRLA, conjunctiva and sclera clear  ENT: Moist mucous membranes  NECK: Supple, No JVD  CHEST/LUNG: CTAB  HEART: RRR  ABDOMEN: Soft, Nontender, Nondistended. No hepatomegally  EXTREMITIES:  2+ Peripheral Pulses, brisk capillary refill. No clubbing, cyanosis, or edema  NERVOUS SYSTEM:  Alert & Oriented X3, speech clear. No deficits       MEDICATIONS  (STANDING):  ascorbic acid 2000 milliGRAM(s) Oral once  aspirin enteric coated 81 milliGRAM(s) Oral daily  atorvastatin 80 milliGRAM(s) Oral at bedtime  chlorhexidine 0.12% Liquid 15 milliLiter(s) Swish and Spit once  chlorhexidine 4% Liquid 1 Application(s) Topical once  chlorhexidine 4% Liquid 1 Application(s) Topical once  dextrose 5%. 1000 milliLiter(s) (100 mL/Hr) IV Continuous <Continuous>  dextrose 5%. 1000 milliLiter(s) (50 mL/Hr) IV Continuous <Continuous>  dextrose 50% Injectable 25 Gram(s) IV Push once  dextrose 50% Injectable 12.5 Gram(s) IV Push once  dextrose 50% Injectable 25 Gram(s) IV Push once  glucagon  Injectable 1 milliGRAM(s) IntraMuscular once  heparin   Injectable 5000 Unit(s) SubCutaneous every 8 hours  insulin glargine Injectable (LANTUS) 10 Unit(s) SubCutaneous every morning  insulin lispro (ADMELOG) corrective regimen sliding scale   SubCutaneous Before meals and at bedtime  isosorbide   mononitrate ER Tablet (IMDUR) 30 milliGRAM(s) Oral daily  metoprolol succinate ER 50 milliGRAM(s) Oral daily  mupirocin 2% Nasal 1 Application(s) Both Nostrils every 12 hours  pantoprazole    Tablet 40 milliGRAM(s) Oral before breakfast  senna 2 Tablet(s) Oral at bedtime  sodium chloride 0.9% lock flush 3 milliLiter(s) IV Push every 8 hours    MEDICATIONS  (PRN):  dextrose Oral Gel 15 Gram(s) Oral once PRN Blood Glucose LESS THAN 70 milliGRAM(s)/deciliter      On Beta Blocker? YES  Labs:                        11.5   5.29  )-----------( 180      ( 30 Jul 2024 07:11 )             37.4     07-30    142  |  106  |  20  ----------------------------<  115<H>  4.5   |  27  |  0.96    Ca    9.2      30 Jul 2024 07:11  Mg     1.9     07-30    TPro  6.9  /  Alb  4.3  /  TBili  0.3  /  DBili  x   /  AST  31  /  ALT  24  /  AlkPhos  94  07-28    PT/INR - ( 28 Jul 2024 14:55 )   PT: 11.2 sec;   INR: 0.98          PTT - ( 28 Jul 2024 14:55 )  PTT:32.3 sec  Urinalysis Basic - ( 30 Jul 2024 07:11 )    Color: x / Appearance: x / SG: x / pH: x  Gluc: 115 mg/dL / Ketone: x  / Bili: x / Urobili: x   Blood: x / Protein: x / Nitrite: x   Leuk Esterase: x / RBC: x / WBC x   Sq Epi: x / Non Sq Epi: x / Bacteria: x      ABO Interpretation: O (07-28-24 @ 15:44)              Hgb A1C: 6.9    EKG: NSR    CXR: c< from: Xray Chest 2 Views PA/Lat (07.29.24 @ 09:56) >  Heart, lungs and mediastinum are unremarkable. Thoracic spine   degenerative changes.    < end of copied text >      CT Scans:< from: CT Chest No Cont (07.29.24 @ 10:38) >  Since 5/31/2024, no change in dimensions of the ascending thoracic aorta.  Moderate to severe coronary calcification along the LAD distribution.  Resolution of the prior small bilateral pleural effusions.    < end of copied text >      Cath Report: LM: 70% ostial stenosis. 80% distal stenosis   LAD: prox: moderate diffuse disease. mid: severe diffuse disease. distal: 70% stenosis. D1: 90%   ostial stenosis, medium size. D2: mild diffuse disease, medium size   LCX: 95% ostial stenosis. Distal: 80% stenosis. OM1: mod/severe diffuse disease   Ramus: 90% stenosis   RCA: Mid RCA  with bridging collaterals. RPDA: mod diffuse disease, not well seen. RPL: large   size, mild diffuse disease       RA: 5/3/2 mmHg   RV: 59/-3/9 mmHg   PA: 59/22/35 mmHg   PCWP: 21/17/17 mmHg     CI: 2.8     Echo:  1. Severely reduced left ventricular systolic function.   2. Regional wall motion abnormalities consistent with ischemic heart   disease.   3. Normal right ventricular size and systolic function.   4. Normal atria.   5. Mild-to-moderate aortic regurgitation.   6. Mild mitral regurgitation.   7. Mild tricuspid regurgitation.   8. Pulmonary hypertension present, pulmonary artery systolic pressure is   60 mmHg.   9. No pericardial effusion.  10. Compared to the previous TTE performed on 6/5/2024, there is evidence   of pulmonary HTN in this study.    PFT's: done    Carotid Duplex: < from: US Duplex Carotid Arteries Complete, Bilateral (06.04.24 @ 20:04) >  No hemodynamically significant stenosis of either carotid   artery.      < end of copied text >      Consult in Chart?  YES  Consent in Chart? YES   Pre-op Orders Placed? YES   Blood Products Ordered? YES   NPO ordered? YES

## 2024-07-31 ENCOUNTER — APPOINTMENT (OUTPATIENT)
Dept: CARDIOTHORACIC SURGERY | Facility: HOSPITAL | Age: 64
End: 2024-07-31

## 2024-07-31 LAB
ALBUMIN SERPL ELPH-MCNC: 3.2 G/DL — LOW (ref 3.3–5)
ALBUMIN SERPL ELPH-MCNC: 4 G/DL — SIGNIFICANT CHANGE UP (ref 3.3–5)
ALBUMIN SERPL ELPH-MCNC: 4.2 G/DL — SIGNIFICANT CHANGE UP (ref 3.3–5)
ALBUMIN SERPL ELPH-MCNC: 4.2 G/DL — SIGNIFICANT CHANGE UP (ref 3.3–5)
ALBUMIN SERPL ELPH-MCNC: 4.7 G/DL — SIGNIFICANT CHANGE UP (ref 3.3–5)
ALP SERPL-CCNC: 60 U/L — SIGNIFICANT CHANGE UP (ref 40–120)
ALP SERPL-CCNC: 63 U/L — SIGNIFICANT CHANGE UP (ref 40–120)
ALP SERPL-CCNC: 66 U/L — SIGNIFICANT CHANGE UP (ref 40–120)
ALP SERPL-CCNC: 68 U/L — SIGNIFICANT CHANGE UP (ref 40–120)
ALP SERPL-CCNC: 97 U/L — SIGNIFICANT CHANGE UP (ref 40–120)
ALT FLD-CCNC: 20 U/L — SIGNIFICANT CHANGE UP (ref 10–45)
ALT FLD-CCNC: 21 U/L — SIGNIFICANT CHANGE UP (ref 10–45)
ALT FLD-CCNC: 21 U/L — SIGNIFICANT CHANGE UP (ref 10–45)
ALT FLD-CCNC: 22 U/L — SIGNIFICANT CHANGE UP (ref 10–45)
ALT FLD-CCNC: 31 U/L — SIGNIFICANT CHANGE UP (ref 10–45)
ANION GAP SERPL CALC-SCNC: 10 MMOL/L — SIGNIFICANT CHANGE UP (ref 5–17)
ANION GAP SERPL CALC-SCNC: 12 MMOL/L — SIGNIFICANT CHANGE UP (ref 5–17)
ANION GAP SERPL CALC-SCNC: 13 MMOL/L — SIGNIFICANT CHANGE UP (ref 5–17)
ANION GAP SERPL CALC-SCNC: 13 MMOL/L — SIGNIFICANT CHANGE UP (ref 5–17)
ANION GAP SERPL CALC-SCNC: 7 MMOL/L — SIGNIFICANT CHANGE UP (ref 5–17)
ANISOCYTOSIS BLD QL: SLIGHT — SIGNIFICANT CHANGE UP
APTT BLD: 105.1 SEC — HIGH (ref 24.5–35.6)
APTT BLD: 28.8 SEC — SIGNIFICANT CHANGE UP (ref 24.5–35.6)
APTT BLD: 29.8 SEC — SIGNIFICANT CHANGE UP (ref 24.5–35.6)
APTT BLD: 35.5 SEC — SIGNIFICANT CHANGE UP (ref 24.5–35.6)
APTT BLD: 43.3 SEC — HIGH (ref 24.5–35.6)
AST SERPL-CCNC: 28 U/L — SIGNIFICANT CHANGE UP (ref 10–40)
AST SERPL-CCNC: 28 U/L — SIGNIFICANT CHANGE UP (ref 10–40)
AST SERPL-CCNC: 30 U/L — SIGNIFICANT CHANGE UP (ref 10–40)
AST SERPL-CCNC: 32 U/L — SIGNIFICANT CHANGE UP (ref 10–40)
AST SERPL-CCNC: 32 U/L — SIGNIFICANT CHANGE UP (ref 10–40)
BASE EXCESS BLDA CALC-SCNC: 1.2 MMOL/L — SIGNIFICANT CHANGE UP (ref -2–3)
BASE EXCESS BLDA CALC-SCNC: 1.9 MMOL/L — SIGNIFICANT CHANGE UP (ref -2–3)
BASE EXCESS BLDA CALC-SCNC: 3.3 MMOL/L — HIGH (ref -2–3)
BASE EXCESS BLDA CALC-SCNC: 3.4 MMOL/L — HIGH (ref -2–3)
BASE EXCESS BLDA CALC-SCNC: 3.5 MMOL/L — HIGH (ref -2–3)
BASE EXCESS BLDV CALC-SCNC: -1.2 MMOL/L — SIGNIFICANT CHANGE UP (ref -2–3)
BASE EXCESS BLDV CALC-SCNC: 1.4 MMOL/L — SIGNIFICANT CHANGE UP (ref -2–3)
BASOPHILS # BLD AUTO: 0.01 K/UL — SIGNIFICANT CHANGE UP (ref 0–0.2)
BASOPHILS # BLD AUTO: 0.03 K/UL — SIGNIFICANT CHANGE UP (ref 0–0.2)
BASOPHILS # BLD AUTO: 0.07 K/UL — SIGNIFICANT CHANGE UP (ref 0–0.2)
BASOPHILS NFR BLD AUTO: 0.1 % — SIGNIFICANT CHANGE UP (ref 0–2)
BASOPHILS NFR BLD AUTO: 0.6 % — SIGNIFICANT CHANGE UP (ref 0–2)
BASOPHILS NFR BLD AUTO: 0.9 % — SIGNIFICANT CHANGE UP (ref 0–2)
BILIRUB SERPL-MCNC: 0.5 MG/DL — SIGNIFICANT CHANGE UP (ref 0.2–1.2)
BILIRUB SERPL-MCNC: 0.5 MG/DL — SIGNIFICANT CHANGE UP (ref 0.2–1.2)
BILIRUB SERPL-MCNC: 0.6 MG/DL — SIGNIFICANT CHANGE UP (ref 0.2–1.2)
BILIRUB SERPL-MCNC: 0.6 MG/DL — SIGNIFICANT CHANGE UP (ref 0.2–1.2)
BILIRUB SERPL-MCNC: 0.9 MG/DL — SIGNIFICANT CHANGE UP (ref 0.2–1.2)
BLD GP AB SCN SERPL QL: NEGATIVE — SIGNIFICANT CHANGE UP
BUN SERPL-MCNC: 14 MG/DL — SIGNIFICANT CHANGE UP (ref 7–23)
BUN SERPL-MCNC: 15 MG/DL — SIGNIFICANT CHANGE UP (ref 7–23)
BUN SERPL-MCNC: 16 MG/DL — SIGNIFICANT CHANGE UP (ref 7–23)
BUN SERPL-MCNC: 17 MG/DL — SIGNIFICANT CHANGE UP (ref 7–23)
BUN SERPL-MCNC: 18 MG/DL — SIGNIFICANT CHANGE UP (ref 7–23)
CA-I BLDA-SCNC: 1.13 MMOL/L — LOW (ref 1.15–1.33)
CA-I BLDA-SCNC: 1.2 MMOL/L — SIGNIFICANT CHANGE UP (ref 1.15–1.33)
CA-I BLDA-SCNC: 1.21 MMOL/L — SIGNIFICANT CHANGE UP (ref 1.15–1.33)
CA-I BLDA-SCNC: 1.25 MMOL/L — SIGNIFICANT CHANGE UP (ref 1.15–1.33)
CA-I BLDA-SCNC: 1.27 MMOL/L — SIGNIFICANT CHANGE UP (ref 1.15–1.33)
CA-I SERPL-SCNC: 1.19 MMOL/L — SIGNIFICANT CHANGE UP (ref 1.15–1.33)
CA-I SERPL-SCNC: 1.22 MMOL/L — SIGNIFICANT CHANGE UP (ref 1.15–1.33)
CALCIUM SERPL-MCNC: 8.2 MG/DL — LOW (ref 8.4–10.5)
CALCIUM SERPL-MCNC: 8.5 MG/DL — SIGNIFICANT CHANGE UP (ref 8.4–10.5)
CALCIUM SERPL-MCNC: 8.5 MG/DL — SIGNIFICANT CHANGE UP (ref 8.4–10.5)
CALCIUM SERPL-MCNC: 8.6 MG/DL — SIGNIFICANT CHANGE UP (ref 8.4–10.5)
CALCIUM SERPL-MCNC: 9.3 MG/DL — SIGNIFICANT CHANGE UP (ref 8.4–10.5)
CHLORIDE SERPL-SCNC: 102 MMOL/L — SIGNIFICANT CHANGE UP (ref 96–108)
CHLORIDE SERPL-SCNC: 104 MMOL/L — SIGNIFICANT CHANGE UP (ref 96–108)
CHLORIDE SERPL-SCNC: 105 MMOL/L — SIGNIFICANT CHANGE UP (ref 96–108)
CHLORIDE SERPL-SCNC: 105 MMOL/L — SIGNIFICANT CHANGE UP (ref 96–108)
CHLORIDE SERPL-SCNC: 107 MMOL/L — SIGNIFICANT CHANGE UP (ref 96–108)
CO2 BLDA-SCNC: 26 MMOL/L — HIGH (ref 19–24)
CO2 BLDA-SCNC: 27 MMOL/L — HIGH (ref 19–24)
CO2 BLDA-SCNC: 27 MMOL/L — HIGH (ref 19–24)
CO2 BLDA-SCNC: 28 MMOL/L — HIGH (ref 19–24)
CO2 BLDA-SCNC: 28 MMOL/L — HIGH (ref 19–24)
CO2 BLDV-SCNC: 24.8 MMOL/L — SIGNIFICANT CHANGE UP (ref 22–26)
CO2 BLDV-SCNC: 28 MMOL/L — HIGH (ref 22–26)
CO2 SERPL-SCNC: 22 MMOL/L — SIGNIFICANT CHANGE UP (ref 22–31)
CO2 SERPL-SCNC: 24 MMOL/L — SIGNIFICANT CHANGE UP (ref 22–31)
CO2 SERPL-SCNC: 28 MMOL/L — SIGNIFICANT CHANGE UP (ref 22–31)
COHGB MFR BLDA: 0 % — SIGNIFICANT CHANGE UP
COHGB MFR BLDA: 0.1 % — SIGNIFICANT CHANGE UP
COHGB MFR BLDA: 0.2 % — SIGNIFICANT CHANGE UP
COHGB MFR BLDA: 0.3 % — SIGNIFICANT CHANGE UP
COHGB MFR BLDA: 0.3 % — SIGNIFICANT CHANGE UP
COHGB MFR BLDV: 1.3 % — SIGNIFICANT CHANGE UP
CREAT SERPL-MCNC: 0.79 MG/DL — SIGNIFICANT CHANGE UP (ref 0.5–1.3)
CREAT SERPL-MCNC: 0.82 MG/DL — SIGNIFICANT CHANGE UP (ref 0.5–1.3)
CREAT SERPL-MCNC: 0.82 MG/DL — SIGNIFICANT CHANGE UP (ref 0.5–1.3)
CREAT SERPL-MCNC: 0.89 MG/DL — SIGNIFICANT CHANGE UP (ref 0.5–1.3)
CREAT SERPL-MCNC: 0.95 MG/DL — SIGNIFICANT CHANGE UP (ref 0.5–1.3)
EGFR: 100 ML/MIN/1.73M2 — SIGNIFICANT CHANGE UP
EGFR: 90 ML/MIN/1.73M2 — SIGNIFICANT CHANGE UP
EGFR: 97 ML/MIN/1.73M2 — SIGNIFICANT CHANGE UP
EGFR: 99 ML/MIN/1.73M2 — SIGNIFICANT CHANGE UP
EGFR: 99 ML/MIN/1.73M2 — SIGNIFICANT CHANGE UP
EOSINOPHIL # BLD AUTO: 0 K/UL — SIGNIFICANT CHANGE UP (ref 0–0.5)
EOSINOPHIL # BLD AUTO: 0 K/UL — SIGNIFICANT CHANGE UP (ref 0–0.5)
EOSINOPHIL # BLD AUTO: 0.18 K/UL — SIGNIFICANT CHANGE UP (ref 0–0.5)
EOSINOPHIL NFR BLD AUTO: 0 % — SIGNIFICANT CHANGE UP (ref 0–6)
EOSINOPHIL NFR BLD AUTO: 0 % — SIGNIFICANT CHANGE UP (ref 0–6)
EOSINOPHIL NFR BLD AUTO: 3.4 % — SIGNIFICANT CHANGE UP (ref 0–6)
GAS PNL BLDA: SIGNIFICANT CHANGE UP
GAS PNL BLDV: 137 MMOL/L — SIGNIFICANT CHANGE UP (ref 136–145)
GAS PNL BLDV: 138 MMOL/L — SIGNIFICANT CHANGE UP (ref 136–145)
GAS PNL BLDV: SIGNIFICANT CHANGE UP
GIANT PLATELETS BLD QL SMEAR: PRESENT — SIGNIFICANT CHANGE UP
GLUCOSE BLDA-MCNC: 143 MG/DL — HIGH (ref 70–99)
GLUCOSE BLDA-MCNC: 143 MG/DL — HIGH (ref 70–99)
GLUCOSE BLDA-MCNC: 153 MG/DL — HIGH (ref 70–99)
GLUCOSE BLDA-MCNC: 163 MG/DL — HIGH (ref 70–99)
GLUCOSE BLDA-MCNC: 164 MG/DL — HIGH (ref 70–99)
GLUCOSE BLDC GLUCOMTR-MCNC: 150 MG/DL — HIGH (ref 70–99)
GLUCOSE BLDC GLUCOMTR-MCNC: 164 MG/DL — HIGH (ref 70–99)
GLUCOSE BLDC GLUCOMTR-MCNC: 193 MG/DL — HIGH (ref 70–99)
GLUCOSE BLDC GLUCOMTR-MCNC: 237 MG/DL — HIGH (ref 70–99)
GLUCOSE BLDV-MCNC: 160 MG/DL — HIGH (ref 70–99)
GLUCOSE SERPL-MCNC: 156 MG/DL — HIGH (ref 70–99)
GLUCOSE SERPL-MCNC: 156 MG/DL — HIGH (ref 70–99)
GLUCOSE SERPL-MCNC: 161 MG/DL — HIGH (ref 70–99)
GLUCOSE SERPL-MCNC: 167 MG/DL — HIGH (ref 70–99)
GLUCOSE SERPL-MCNC: 218 MG/DL — HIGH (ref 70–99)
HCO3 BLDA-SCNC: 25 MMOL/L — SIGNIFICANT CHANGE UP (ref 21–28)
HCO3 BLDA-SCNC: 26 MMOL/L — SIGNIFICANT CHANGE UP (ref 21–28)
HCO3 BLDA-SCNC: 26 MMOL/L — SIGNIFICANT CHANGE UP (ref 21–28)
HCO3 BLDA-SCNC: 27 MMOL/L — SIGNIFICANT CHANGE UP (ref 21–28)
HCO3 BLDA-SCNC: 27 MMOL/L — SIGNIFICANT CHANGE UP (ref 21–28)
HCO3 BLDV-SCNC: 24 MMOL/L — SIGNIFICANT CHANGE UP (ref 22–29)
HCO3 BLDV-SCNC: 27 MMOL/L — SIGNIFICANT CHANGE UP (ref 22–29)
HCT VFR BLD CALC: 26 % — LOW (ref 39–50)
HCT VFR BLD CALC: 26.9 % — LOW (ref 39–50)
HCT VFR BLD CALC: 27.1 % — LOW (ref 39–50)
HCT VFR BLD CALC: 29.6 % — LOW (ref 39–50)
HCT VFR BLD CALC: 38.1 % — LOW (ref 39–50)
HGB BLD CALC-MCNC: 9.4 G/DL — LOW (ref 12.6–17.4)
HGB BLD-MCNC: 12 G/DL — LOW (ref 13–17)
HGB BLD-MCNC: 8.3 G/DL — LOW (ref 13–17)
HGB BLD-MCNC: 8.6 G/DL — LOW (ref 13–17)
HGB BLD-MCNC: 8.6 G/DL — LOW (ref 13–17)
HGB BLD-MCNC: 9.4 G/DL — LOW (ref 13–17)
HGB BLDA-MCNC: 10.4 G/DL — LOW (ref 12.6–17.4)
HGB BLDA-MCNC: 10.4 G/DL — LOW (ref 12.6–17.4)
HGB BLDA-MCNC: 11.2 G/DL — LOW (ref 12.6–17.4)
HGB BLDA-MCNC: 9.7 G/DL — LOW (ref 12.6–17.4)
HGB BLDA-MCNC: 9.7 G/DL — LOW (ref 12.6–17.4)
IMM GRANULOCYTES NFR BLD AUTO: 0.2 % — SIGNIFICANT CHANGE UP (ref 0–0.9)
IMM GRANULOCYTES NFR BLD AUTO: 0.2 % — SIGNIFICANT CHANGE UP (ref 0–0.9)
INR BLD: 0.92 — SIGNIFICANT CHANGE UP (ref 0.85–1.18)
INR BLD: 1.01 — SIGNIFICANT CHANGE UP (ref 0.85–1.18)
INR BLD: 1.03 — SIGNIFICANT CHANGE UP (ref 0.85–1.18)
INR BLD: 1.06 — SIGNIFICANT CHANGE UP (ref 0.85–1.18)
INR BLD: 1.12 — SIGNIFICANT CHANGE UP (ref 0.85–1.18)
ISTAT ARTERIAL BE: -2 MMOL/L — SIGNIFICANT CHANGE UP (ref -2–3)
ISTAT ARTERIAL GLUCOSE: 158 MG/DL — HIGH (ref 70–99)
ISTAT ARTERIAL HCO3: 23 MMOL/L — SIGNIFICANT CHANGE UP (ref 22–26)
ISTAT ARTERIAL HEMATOCRIT: 29 % — LOW (ref 39–50)
ISTAT ARTERIAL HEMOGLOBIN: 9.9 G/DL — LOW (ref 13–17)
ISTAT ARTERIAL IONIZED CALCIUM: 1.17 MMOL/L — SIGNIFICANT CHANGE UP (ref 1.12–1.3)
ISTAT ARTERIAL PCO2: 35 MMHG — SIGNIFICANT CHANGE UP (ref 35–45)
ISTAT ARTERIAL PH: 7.41 — SIGNIFICANT CHANGE UP (ref 7.35–7.45)
ISTAT ARTERIAL PO2: 96 MMHG — SIGNIFICANT CHANGE UP (ref 80–105)
ISTAT ARTERIAL POTASSIUM: 3.8 MMOL/L — SIGNIFICANT CHANGE UP (ref 3.5–5.3)
ISTAT ARTERIAL SO2: 98 % — SIGNIFICANT CHANGE UP (ref 95–98)
ISTAT ARTERIAL SODIUM: 141 MMOL/L — SIGNIFICANT CHANGE UP (ref 135–145)
ISTAT ARTERIAL TCO2: 24 MMOL/L — SIGNIFICANT CHANGE UP (ref 22–31)
LACTATE SERPL-SCNC: 0.9 MMOL/L — SIGNIFICANT CHANGE UP (ref 0.5–2)
LACTATE SERPL-SCNC: 0.9 MMOL/L — SIGNIFICANT CHANGE UP (ref 0.5–2)
LACTATE SERPL-SCNC: 1.1 MMOL/L — SIGNIFICANT CHANGE UP (ref 0.5–2)
LYMPHOCYTES # BLD AUTO: 0.33 K/UL — LOW (ref 1–3.3)
LYMPHOCYTES # BLD AUTO: 0.48 K/UL — LOW (ref 1–3.3)
LYMPHOCYTES # BLD AUTO: 1.43 K/UL — SIGNIFICANT CHANGE UP (ref 1–3.3)
LYMPHOCYTES # BLD AUTO: 27.3 % — SIGNIFICANT CHANGE UP (ref 13–44)
LYMPHOCYTES # BLD AUTO: 3.8 % — LOW (ref 13–44)
LYMPHOCYTES # BLD AUTO: 6.1 % — LOW (ref 13–44)
MAGNESIUM SERPL-MCNC: 1.6 MG/DL — SIGNIFICANT CHANGE UP (ref 1.6–2.6)
MAGNESIUM SERPL-MCNC: 1.7 MG/DL — SIGNIFICANT CHANGE UP (ref 1.6–2.6)
MAGNESIUM SERPL-MCNC: 2.1 MG/DL — SIGNIFICANT CHANGE UP (ref 1.6–2.6)
MANUAL SMEAR VERIFICATION: SIGNIFICANT CHANGE UP
MCHC RBC-ENTMCNC: 23.7 PG — LOW (ref 27–34)
MCHC RBC-ENTMCNC: 23.7 PG — LOW (ref 27–34)
MCHC RBC-ENTMCNC: 23.8 PG — LOW (ref 27–34)
MCHC RBC-ENTMCNC: 23.9 PG — LOW (ref 27–34)
MCHC RBC-ENTMCNC: 24 PG — LOW (ref 27–34)
MCHC RBC-ENTMCNC: 31.5 GM/DL — LOW (ref 32–36)
MCHC RBC-ENTMCNC: 31.7 GM/DL — LOW (ref 32–36)
MCHC RBC-ENTMCNC: 31.8 GM/DL — LOW (ref 32–36)
MCHC RBC-ENTMCNC: 31.9 GM/DL — LOW (ref 32–36)
MCHC RBC-ENTMCNC: 32 GM/DL — SIGNIFICANT CHANGE UP (ref 32–36)
MCV RBC AUTO: 74.1 FL — LOW (ref 80–100)
MCV RBC AUTO: 74.3 FL — LOW (ref 80–100)
MCV RBC AUTO: 74.9 FL — LOW (ref 80–100)
MCV RBC AUTO: 75.7 FL — LOW (ref 80–100)
MCV RBC AUTO: 75.9 FL — LOW (ref 80–100)
METHGB MFR BLDA: 0 % — SIGNIFICANT CHANGE UP
METHGB MFR BLDA: 0.4 % — SIGNIFICANT CHANGE UP
METHGB MFR BLDA: 0.7 % — SIGNIFICANT CHANGE UP
METHGB MFR BLDA: 0.8 % — SIGNIFICANT CHANGE UP
METHGB MFR BLDA: 1.1 % — SIGNIFICANT CHANGE UP
MICROCYTES BLD QL: SLIGHT — SIGNIFICANT CHANGE UP
MONOCYTES # BLD AUTO: 0.21 K/UL — SIGNIFICANT CHANGE UP (ref 0–0.9)
MONOCYTES # BLD AUTO: 0.39 K/UL — SIGNIFICANT CHANGE UP (ref 0–0.9)
MONOCYTES # BLD AUTO: 0.57 K/UL — SIGNIFICANT CHANGE UP (ref 0–0.9)
MONOCYTES NFR BLD AUTO: 2.6 % — SIGNIFICANT CHANGE UP (ref 2–14)
MONOCYTES NFR BLD AUTO: 6.6 % — SIGNIFICANT CHANGE UP (ref 2–14)
MONOCYTES NFR BLD AUTO: 7.5 % — SIGNIFICANT CHANGE UP (ref 2–14)
MYELOCYTES NFR BLD: 0.9 % — HIGH (ref 0–0)
NEUTROPHILS # BLD AUTO: 3.19 K/UL — SIGNIFICANT CHANGE UP (ref 1.8–7.4)
NEUTROPHILS # BLD AUTO: 7.03 K/UL — SIGNIFICANT CHANGE UP (ref 1.8–7.4)
NEUTROPHILS # BLD AUTO: 7.69 K/UL — HIGH (ref 1.8–7.4)
NEUTROPHILS NFR BLD AUTO: 61 % — SIGNIFICANT CHANGE UP (ref 43–77)
NEUTROPHILS NFR BLD AUTO: 84.2 % — HIGH (ref 43–77)
NEUTROPHILS NFR BLD AUTO: 89.3 % — HIGH (ref 43–77)
NEUTS BAND # BLD: 4.4 % — SIGNIFICANT CHANGE UP (ref 0–8)
NRBC # BLD: 0 /100 WBCS — SIGNIFICANT CHANGE UP (ref 0–0)
NRBC # BLD: 1 /100 WBCS — HIGH (ref 0–0)
NRBC # BLD: SIGNIFICANT CHANGE UP /100 WBCS (ref 0–0)
OVALOCYTES BLD QL SMEAR: SLIGHT — SIGNIFICANT CHANGE UP
OXYHGB MFR BLDA: 97.3 % — HIGH (ref 90–95)
OXYHGB MFR BLDA: 97.6 % — HIGH (ref 90–95)
OXYHGB MFR BLDA: 97.8 % — HIGH (ref 90–95)
OXYHGB MFR BLDA: 98.3 % — HIGH (ref 90–95)
OXYHGB MFR BLDA: 98.7 % — HIGH (ref 90–95)
PCO2 BLDA: 33 MMHG — LOW (ref 35–48)
PCO2 BLDA: 35 MMHG — SIGNIFICANT CHANGE UP (ref 35–48)
PCO2 BLDV: 39 MMHG — LOW (ref 42–55)
PCO2 BLDV: 44 MMHG — SIGNIFICANT CHANGE UP (ref 42–55)
PH BLDA: 7.46 — HIGH (ref 7.35–7.45)
PH BLDA: 7.47 — HIGH (ref 7.35–7.45)
PH BLDA: 7.49 — HIGH (ref 7.35–7.45)
PH BLDA: 7.49 — HIGH (ref 7.35–7.45)
PH BLDA: 7.51 — HIGH (ref 7.35–7.45)
PH BLDV: 7.39 — SIGNIFICANT CHANGE UP (ref 7.32–7.43)
PH BLDV: 7.39 — SIGNIFICANT CHANGE UP (ref 7.32–7.43)
PHOSPHATE SERPL-MCNC: 2.9 MG/DL — SIGNIFICANT CHANGE UP (ref 2.5–4.5)
PHOSPHATE SERPL-MCNC: 3.1 MG/DL — SIGNIFICANT CHANGE UP (ref 2.5–4.5)
PHOSPHATE SERPL-MCNC: 3.7 MG/DL — SIGNIFICANT CHANGE UP (ref 2.5–4.5)
PHOSPHATE SERPL-MCNC: 4 MG/DL — SIGNIFICANT CHANGE UP (ref 2.5–4.5)
PHOSPHATE SERPL-MCNC: 4 MG/DL — SIGNIFICANT CHANGE UP (ref 2.5–4.5)
PLAT MORPH BLD: ABNORMAL
PLATELET # BLD AUTO: 125 K/UL — LOW (ref 150–400)
PLATELET # BLD AUTO: 126 K/UL — LOW (ref 150–400)
PLATELET # BLD AUTO: 137 K/UL — LOW (ref 150–400)
PLATELET # BLD AUTO: 179 K/UL — SIGNIFICANT CHANGE UP (ref 150–400)
PLATELET # BLD AUTO: 191 K/UL — SIGNIFICANT CHANGE UP (ref 150–400)
PO2 BLDA: 274 MMHG — HIGH (ref 83–108)
PO2 BLDA: 402 MMHG — HIGH (ref 83–108)
PO2 BLDA: 406 MMHG — HIGH (ref 83–108)
PO2 BLDA: 411 MMHG — HIGH (ref 83–108)
PO2 BLDA: 471 MMHG — HIGH (ref 83–108)
PO2 BLDV: 40 MMHG — SIGNIFICANT CHANGE UP (ref 25–45)
PO2 BLDV: 45 MMHG — SIGNIFICANT CHANGE UP (ref 25–45)
POLYCHROMASIA BLD QL SMEAR: SLIGHT — SIGNIFICANT CHANGE UP
POTASSIUM BLDA-SCNC: 3.7 MMOL/L — SIGNIFICANT CHANGE UP (ref 3.5–5.1)
POTASSIUM BLDA-SCNC: 3.9 MMOL/L — SIGNIFICANT CHANGE UP (ref 3.5–5.1)
POTASSIUM BLDA-SCNC: 4 MMOL/L — SIGNIFICANT CHANGE UP (ref 3.5–5.1)
POTASSIUM BLDA-SCNC: 4.3 MMOL/L — SIGNIFICANT CHANGE UP (ref 3.5–5.1)
POTASSIUM BLDA-SCNC: 4.4 MMOL/L — SIGNIFICANT CHANGE UP (ref 3.5–5.1)
POTASSIUM BLDV-SCNC: 4 MMOL/L — SIGNIFICANT CHANGE UP (ref 3.5–5.1)
POTASSIUM BLDV-SCNC: 4.1 MMOL/L — SIGNIFICANT CHANGE UP (ref 3.5–5.1)
POTASSIUM SERPL-MCNC: 3.9 MMOL/L — SIGNIFICANT CHANGE UP (ref 3.5–5.3)
POTASSIUM SERPL-MCNC: 3.9 MMOL/L — SIGNIFICANT CHANGE UP (ref 3.5–5.3)
POTASSIUM SERPL-MCNC: 4.1 MMOL/L — SIGNIFICANT CHANGE UP (ref 3.5–5.3)
POTASSIUM SERPL-MCNC: 4.1 MMOL/L — SIGNIFICANT CHANGE UP (ref 3.5–5.3)
POTASSIUM SERPL-MCNC: 4.2 MMOL/L — SIGNIFICANT CHANGE UP (ref 3.5–5.3)
POTASSIUM SERPL-SCNC: 3.9 MMOL/L — SIGNIFICANT CHANGE UP (ref 3.5–5.3)
POTASSIUM SERPL-SCNC: 3.9 MMOL/L — SIGNIFICANT CHANGE UP (ref 3.5–5.3)
POTASSIUM SERPL-SCNC: 4.1 MMOL/L — SIGNIFICANT CHANGE UP (ref 3.5–5.3)
POTASSIUM SERPL-SCNC: 4.1 MMOL/L — SIGNIFICANT CHANGE UP (ref 3.5–5.3)
POTASSIUM SERPL-SCNC: 4.2 MMOL/L — SIGNIFICANT CHANGE UP (ref 3.5–5.3)
PROT SERPL-MCNC: 5.5 G/DL — LOW (ref 6–8.3)
PROT SERPL-MCNC: 6.1 G/DL — SIGNIFICANT CHANGE UP (ref 6–8.3)
PROT SERPL-MCNC: 6.1 G/DL — SIGNIFICANT CHANGE UP (ref 6–8.3)
PROT SERPL-MCNC: 6.3 G/DL — SIGNIFICANT CHANGE UP (ref 6–8.3)
PROT SERPL-MCNC: 7.7 G/DL — SIGNIFICANT CHANGE UP (ref 6–8.3)
PROTHROM AB SERPL-ACNC: 10.5 SEC — SIGNIFICANT CHANGE UP (ref 9.5–13)
PROTHROM AB SERPL-ACNC: 11.5 SEC — SIGNIFICANT CHANGE UP (ref 9.5–13)
PROTHROM AB SERPL-ACNC: 11.7 SEC — SIGNIFICANT CHANGE UP (ref 9.5–13)
PROTHROM AB SERPL-ACNC: 12.1 SEC — SIGNIFICANT CHANGE UP (ref 9.5–13)
PROTHROM AB SERPL-ACNC: 12.7 SEC — SIGNIFICANT CHANGE UP (ref 9.5–13)
RBC # BLD: 3.5 M/UL — LOW (ref 4.2–5.8)
RBC # BLD: 3.62 M/UL — LOW (ref 4.2–5.8)
RBC # BLD: 3.63 M/UL — LOW (ref 4.2–5.8)
RBC # BLD: 3.91 M/UL — LOW (ref 4.2–5.8)
RBC # BLD: 5.02 M/UL — SIGNIFICANT CHANGE UP (ref 4.2–5.8)
RBC # FLD: 16.9 % — HIGH (ref 10.3–14.5)
RBC # FLD: 17 % — HIGH (ref 10.3–14.5)
RBC # FLD: 17.2 % — HIGH (ref 10.3–14.5)
RBC # FLD: 17.3 % — HIGH (ref 10.3–14.5)
RBC # FLD: 17.5 % — HIGH (ref 10.3–14.5)
RBC BLD AUTO: ABNORMAL
RH IG SCN BLD-IMP: POSITIVE — SIGNIFICANT CHANGE UP
SAO2 % BLDA: 98.3 % — HIGH (ref 94–98)
SAO2 % BLDA: 98.7 % — HIGH (ref 94–98)
SAO2 % BLDA: 98.7 % — HIGH (ref 94–98)
SAO2 % BLDA: 98.8 % — HIGH (ref 94–98)
SAO2 % BLDA: 98.9 % — HIGH (ref 94–98)
SAO2 % BLDV: 67.4 % — SIGNIFICANT CHANGE UP (ref 67–88)
SAO2 % BLDV: 75.4 % — SIGNIFICANT CHANGE UP (ref 67–88)
SODIUM BLDA-SCNC: 137 MMOL/L — SIGNIFICANT CHANGE UP (ref 136–145)
SODIUM BLDA-SCNC: 138 MMOL/L — SIGNIFICANT CHANGE UP (ref 136–145)
SODIUM BLDA-SCNC: 139 MMOL/L — SIGNIFICANT CHANGE UP (ref 136–145)
SODIUM SERPL-SCNC: 138 MMOL/L — SIGNIFICANT CHANGE UP (ref 135–145)
SODIUM SERPL-SCNC: 139 MMOL/L — SIGNIFICANT CHANGE UP (ref 135–145)
SODIUM SERPL-SCNC: 139 MMOL/L — SIGNIFICANT CHANGE UP (ref 135–145)
SODIUM SERPL-SCNC: 142 MMOL/L — SIGNIFICANT CHANGE UP (ref 135–145)
SODIUM SERPL-SCNC: 142 MMOL/L — SIGNIFICANT CHANGE UP (ref 135–145)
VARIANT LYMPHS # BLD: 0.9 % — SIGNIFICANT CHANGE UP (ref 0–6)
WBC # BLD: 12.86 K/UL — HIGH (ref 3.8–10.5)
WBC # BLD: 5.23 K/UL — SIGNIFICANT CHANGE UP (ref 3.8–10.5)
WBC # BLD: 7.86 K/UL — SIGNIFICANT CHANGE UP (ref 3.8–10.5)
WBC # BLD: 7.94 K/UL — SIGNIFICANT CHANGE UP (ref 3.8–10.5)
WBC # BLD: 8.62 K/UL — SIGNIFICANT CHANGE UP (ref 3.8–10.5)
WBC # FLD AUTO: 12.86 K/UL — HIGH (ref 3.8–10.5)
WBC # FLD AUTO: 5.23 K/UL — SIGNIFICANT CHANGE UP (ref 3.8–10.5)
WBC # FLD AUTO: 7.86 K/UL — SIGNIFICANT CHANGE UP (ref 3.8–10.5)
WBC # FLD AUTO: 7.94 K/UL — SIGNIFICANT CHANGE UP (ref 3.8–10.5)
WBC # FLD AUTO: 8.62 K/UL — SIGNIFICANT CHANGE UP (ref 3.8–10.5)

## 2024-07-31 PROCEDURE — 33517 CABG ARTERY-VEIN SINGLE: CPT

## 2024-07-31 PROCEDURE — 71045 X-RAY EXAM CHEST 1 VIEW: CPT | Mod: 26

## 2024-07-31 PROCEDURE — 33534 CABG ARTERIAL TWO: CPT

## 2024-07-31 PROCEDURE — 99291 CRITICAL CARE FIRST HOUR: CPT

## 2024-07-31 PROCEDURE — 99292 CRITICAL CARE ADDL 30 MIN: CPT

## 2024-07-31 DEVICE — CHEST DRAIN THORACIC PVC 32FR RIGHT ANGLE: Type: IMPLANTABLE DEVICE | Status: FUNCTIONAL

## 2024-07-31 DEVICE — SHUNT FLO-THRU INTRALUMINAL1.75MM X 18MM: Type: IMPLANTABLE DEVICE | Status: FUNCTIONAL

## 2024-07-31 RX ORDER — ACETAMINOPHEN 500 MG
1000 TABLET ORAL EVERY 6 HOURS
Refills: 0 | Status: COMPLETED | OUTPATIENT
Start: 2024-07-31 | End: 2024-08-01

## 2024-07-31 RX ORDER — MAGNESIUM SULFATE 500 MG/ML
2 VIAL (ML) INJECTION ONCE
Refills: 0 | Status: COMPLETED | OUTPATIENT
Start: 2024-07-31 | End: 2024-07-31

## 2024-07-31 RX ORDER — ASPIRIN 325 MG
10 TABLET ORAL ONCE
Refills: 0 | Status: DISCONTINUED | OUTPATIENT
Start: 2024-08-02 | End: 2024-08-04

## 2024-07-31 RX ORDER — DEXTROSE 4 G
25 TABLET,CHEWABLE ORAL ONCE
Refills: 0 | Status: DISCONTINUED | OUTPATIENT
Start: 2024-07-31 | End: 2024-08-05

## 2024-07-31 RX ORDER — INSULIN GLARGINE-YFGN 100 [IU]/ML
12 INJECTION, SOLUTION SUBCUTANEOUS AT BEDTIME
Refills: 0 | Status: DISCONTINUED | OUTPATIENT
Start: 2024-07-31 | End: 2024-08-01

## 2024-07-31 RX ORDER — SENNOSIDES 8.6 MG/1
2 TABLET ORAL AT BEDTIME
Refills: 0 | Status: DISCONTINUED | OUTPATIENT
Start: 2024-08-01 | End: 2024-08-05

## 2024-07-31 RX ORDER — DEXMEDETOMIDINE HYDROCHLORIDE 4 UG/ML
0.3 INJECTION, SOLUTION INTRAVENOUS
Qty: 200 | Refills: 0 | Status: DISCONTINUED | OUTPATIENT
Start: 2024-07-31 | End: 2024-07-31

## 2024-07-31 RX ORDER — INSULIN LISPRO 100/ML
VIAL (ML) SUBCUTANEOUS
Refills: 0 | Status: DISCONTINUED | OUTPATIENT
Start: 2024-07-31 | End: 2024-08-05

## 2024-07-31 RX ORDER — DOBUTAMINE HYDROCHLORIDE 12.5 MG/ML
2 INJECTION INTRAVENOUS
Qty: 500 | Refills: 0 | Status: DISCONTINUED | OUTPATIENT
Start: 2024-07-31 | End: 2024-08-02

## 2024-07-31 RX ORDER — ALBUMIN HUMAN 25 %
250 INTRAVENOUS SOLUTION INTRAVENOUS
Refills: 0 | Status: DISCONTINUED | OUTPATIENT
Start: 2024-07-31 | End: 2024-07-31

## 2024-07-31 RX ORDER — MUPIROCIN CALCIUM 20 MG/G
1 CREAM TOPICAL
Refills: 0 | Status: COMPLETED | OUTPATIENT
Start: 2024-07-31 | End: 2024-08-05

## 2024-07-31 RX ORDER — GLUCAGON INJECTION, SOLUTION 0.5 MG/.1ML
1 INJECTION, SOLUTION SUBCUTANEOUS ONCE
Refills: 0 | Status: DISCONTINUED | OUTPATIENT
Start: 2024-07-31 | End: 2024-08-05

## 2024-07-31 RX ORDER — PANTOPRAZOLE SODIUM 20 MG/1
40 TABLET, DELAYED RELEASE ORAL DAILY
Refills: 0 | Status: DISCONTINUED | OUTPATIENT
Start: 2024-07-31 | End: 2024-07-31

## 2024-07-31 RX ORDER — CHLORHEXIDINE GLUCONATE 500 MG/1
1 CLOTH TOPICAL DAILY
Refills: 0 | Status: DISCONTINUED | OUTPATIENT
Start: 2024-07-31 | End: 2024-08-05

## 2024-07-31 RX ORDER — CLOPIDOGREL BISULFATE 75 MG/1
75 TABLET, FILM COATED ORAL DAILY
Refills: 0 | Status: DISCONTINUED | OUTPATIENT
Start: 2024-07-31 | End: 2024-08-05

## 2024-07-31 RX ORDER — POTASSIUM CHLORIDE 1500 MG/1
20 TABLET, EXTENDED RELEASE ORAL ONCE
Refills: 0 | Status: COMPLETED | OUTPATIENT
Start: 2024-07-31 | End: 2024-07-31

## 2024-07-31 RX ORDER — FENTANYL CITRATE 1200 UG/1
25 LOZENGE ORAL; TRANSMUCOSAL ONCE
Refills: 0 | Status: DISCONTINUED | OUTPATIENT
Start: 2024-07-31 | End: 2024-07-31

## 2024-07-31 RX ORDER — DEXTROSE 4 G
25 TABLET,CHEWABLE ORAL
Refills: 0 | Status: DISCONTINUED | OUTPATIENT
Start: 2024-07-31 | End: 2024-07-31

## 2024-07-31 RX ORDER — LYSINE HCL 500 MG
500 TABLET ORAL
Refills: 0 | Status: COMPLETED | OUTPATIENT
Start: 2024-07-31 | End: 2024-08-05

## 2024-07-31 RX ORDER — DEXTROSE MONOHYDRATE, SODIUM CHLORIDE, SODIUM LACTATE, CALCIUM CHLORIDE, MAGNESIUM CHLORIDE 1.5; 538; 448; 18.4; 5.08 G/100ML; MG/100ML; MG/100ML; MG/100ML; MG/100ML
1000 SOLUTION INTRAPERITONEAL
Refills: 0 | Status: DISCONTINUED | OUTPATIENT
Start: 2024-07-31 | End: 2024-08-05

## 2024-07-31 RX ORDER — LORATADINE 10 MG
17 TABLET,DISINTEGRATING ORAL DAILY
Refills: 0 | Status: DISCONTINUED | OUTPATIENT
Start: 2024-08-01 | End: 2024-08-05

## 2024-07-31 RX ORDER — CHLORHEXIDINE GLUCONATE 500 MG/1
15 CLOTH TOPICAL EVERY 12 HOURS
Refills: 0 | Status: DISCONTINUED | OUTPATIENT
Start: 2024-07-31 | End: 2024-07-31

## 2024-07-31 RX ORDER — INSULIN LISPRO 100/ML
4 VIAL (ML) SUBCUTANEOUS
Refills: 0 | Status: DISCONTINUED | OUTPATIENT
Start: 2024-07-31 | End: 2024-08-01

## 2024-07-31 RX ORDER — GABAPENTIN 400 MG/1
100 CAPSULE ORAL EVERY 8 HOURS
Refills: 0 | Status: COMPLETED | OUTPATIENT
Start: 2024-07-31 | End: 2024-08-05

## 2024-07-31 RX ORDER — DEXTROSE 4 G
50 TABLET,CHEWABLE ORAL
Refills: 0 | Status: DISCONTINUED | OUTPATIENT
Start: 2024-07-31 | End: 2024-07-31

## 2024-07-31 RX ORDER — NITROGLYCERIN 400 UG/1
10 AEROSOL, METERED SUBLINGUAL
Qty: 50 | Refills: 0 | Status: DISCONTINUED | OUTPATIENT
Start: 2024-07-31 | End: 2024-08-01

## 2024-07-31 RX ORDER — PANTOPRAZOLE SODIUM 20 MG/1
40 TABLET, DELAYED RELEASE ORAL
Refills: 0 | Status: DISCONTINUED | OUTPATIENT
Start: 2024-07-31 | End: 2024-08-05

## 2024-07-31 RX ORDER — FUROSEMIDE 10 MG/ML
20 INJECTION, SOLUTION INTRAVENOUS ONCE
Refills: 0 | Status: COMPLETED | OUTPATIENT
Start: 2024-07-31 | End: 2024-07-31

## 2024-07-31 RX ORDER — FUROSEMIDE 10 MG/ML
10 INJECTION, SOLUTION INTRAVENOUS ONCE
Refills: 0 | Status: COMPLETED | OUTPATIENT
Start: 2024-07-31 | End: 2024-07-31

## 2024-07-31 RX ORDER — BACTERIOSTATIC SODIUM CHLORIDE 0.9 %
1000 VIAL (ML) INJECTION
Refills: 0 | Status: DISCONTINUED | OUTPATIENT
Start: 2024-07-31 | End: 2024-08-04

## 2024-07-31 RX ORDER — DEXTROSE 4 G
12.5 TABLET,CHEWABLE ORAL ONCE
Refills: 0 | Status: DISCONTINUED | OUTPATIENT
Start: 2024-07-31 | End: 2024-08-05

## 2024-07-31 RX ORDER — HEPARIN SODIUM 1000 [USP'U]/ML
5000 INJECTION, SOLUTION INTRAVENOUS; SUBCUTANEOUS EVERY 8 HOURS
Refills: 0 | Status: DISCONTINUED | OUTPATIENT
Start: 2024-07-31 | End: 2024-08-02

## 2024-07-31 RX ORDER — KETOROLAC TROMETHAMINE 10 MG
15 TABLET ORAL ONCE
Refills: 0 | Status: DISCONTINUED | OUTPATIENT
Start: 2024-07-31 | End: 2024-07-31

## 2024-07-31 RX ORDER — BUPIVACAINE HCL/0.9 % NACL/PF 0.25 %
0.04 PLASTIC BAG, INJECTION (ML) EPIDURAL
Qty: 8 | Refills: 0 | Status: DISCONTINUED | OUTPATIENT
Start: 2024-07-31 | End: 2024-07-31

## 2024-07-31 RX ORDER — CEFAZOLIN SODIUM 10 G
2000 VIAL (EA) INJECTION EVERY 8 HOURS
Refills: 0 | Status: COMPLETED | OUTPATIENT
Start: 2024-07-31 | End: 2024-08-01

## 2024-07-31 RX ORDER — ASPIRIN 500 MG
81 TABLET ORAL DAILY
Refills: 0 | Status: DISCONTINUED | OUTPATIENT
Start: 2024-07-31 | End: 2024-08-05

## 2024-07-31 RX ORDER — DEXTROSE 4 G
15 TABLET,CHEWABLE ORAL ONCE
Refills: 0 | Status: DISCONTINUED | OUTPATIENT
Start: 2024-07-31 | End: 2024-08-05

## 2024-07-31 RX ORDER — ACETAMINOPHEN 500 MG
1000 TABLET ORAL ONCE
Refills: 0 | Status: COMPLETED | OUTPATIENT
Start: 2024-07-31 | End: 2024-07-31

## 2024-07-31 RX ORDER — ALBUMIN HUMAN 25 %
250 INTRAVENOUS SOLUTION INTRAVENOUS
Refills: 0 | Status: COMPLETED | OUTPATIENT
Start: 2024-07-31 | End: 2024-07-31

## 2024-07-31 RX ORDER — ACETAMINOPHEN 500 MG
650 TABLET ORAL EVERY 6 HOURS
Refills: 0 | Status: DISCONTINUED | OUTPATIENT
Start: 2024-08-01 | End: 2024-08-01

## 2024-07-31 RX ADMIN — INSULIN GLARGINE-YFGN 10 UNIT(S): 100 INJECTION, SOLUTION SUBCUTANEOUS at 06:21

## 2024-07-31 RX ADMIN — MUPIROCIN CALCIUM 1 APPLICATION(S): 20 CREAM TOPICAL at 20:00

## 2024-07-31 RX ADMIN — Medication 500 MILLIGRAM(S): at 19:24

## 2024-07-31 RX ADMIN — Medication 1000 MILLIGRAM(S): at 06:30

## 2024-07-31 RX ADMIN — FENTANYL CITRATE 25 MICROGRAM(S): 1200 LOZENGE ORAL; TRANSMUCOSAL at 13:20

## 2024-07-31 RX ADMIN — Medication 1000 MILLIGRAM(S): at 05:42

## 2024-07-31 RX ADMIN — CLOPIDOGREL BISULFATE 75 MILLIGRAM(S): 75 TABLET, FILM COATED ORAL at 19:23

## 2024-07-31 RX ADMIN — GABAPENTIN 100 MILLIGRAM(S): 400 CAPSULE ORAL at 21:22

## 2024-07-31 RX ADMIN — MUPIROCIN CALCIUM 1 APPLICATION(S): 20 CREAM TOPICAL at 06:27

## 2024-07-31 RX ADMIN — Medication 81 MILLIGRAM(S): at 19:24

## 2024-07-31 RX ADMIN — Medication 400 MILLIGRAM(S): at 14:01

## 2024-07-31 RX ADMIN — FUROSEMIDE 20 MILLIGRAM(S): 10 INJECTION, SOLUTION INTRAVENOUS at 14:51

## 2024-07-31 RX ADMIN — CHLORHEXIDINE GLUCONATE 15 MILLILITER(S): 500 CLOTH TOPICAL at 05:36

## 2024-07-31 RX ADMIN — Medication 50 MILLIGRAM(S): at 05:42

## 2024-07-31 RX ADMIN — HEPARIN SODIUM 5000 UNIT(S): 1000 INJECTION, SOLUTION INTRAVENOUS; SUBCUTANEOUS at 21:22

## 2024-07-31 RX ADMIN — Medication 200 GRAM(S): at 22:15

## 2024-07-31 RX ADMIN — FENTANYL CITRATE 25 MICROGRAM(S): 1200 LOZENGE ORAL; TRANSMUCOSAL at 13:01

## 2024-07-31 RX ADMIN — Medication 3 MILLILITER(S): at 06:24

## 2024-07-31 RX ADMIN — Medication 100 MILLIGRAM(S): at 16:56

## 2024-07-31 RX ADMIN — FUROSEMIDE 10 MILLIGRAM(S): 10 INJECTION, SOLUTION INTRAVENOUS at 20:32

## 2024-07-31 RX ADMIN — PANTOPRAZOLE SODIUM 40 MILLIGRAM(S): 20 TABLET, DELAYED RELEASE ORAL at 05:43

## 2024-07-31 RX ADMIN — Medication 2: at 21:44

## 2024-07-31 RX ADMIN — Medication 25 GRAM(S): at 15:39

## 2024-07-31 RX ADMIN — Medication 2000 MILLIGRAM(S): at 05:42

## 2024-07-31 RX ADMIN — POTASSIUM CHLORIDE 100 MILLIEQUIVALENT(S): 1500 TABLET, EXTENDED RELEASE ORAL at 17:52

## 2024-07-31 RX ADMIN — INSULIN GLARGINE-YFGN 12 UNIT(S): 100 INJECTION, SOLUTION SUBCUTANEOUS at 21:38

## 2024-07-31 RX ADMIN — Medication 2: at 06:21

## 2024-07-31 RX ADMIN — Medication 1000 MILLIGRAM(S): at 14:30

## 2024-07-31 RX ADMIN — Medication 125 MILLILITER(S): at 13:30

## 2024-07-31 RX ADMIN — DOBUTAMINE HYDROCHLORIDE 6.29 MICROGRAM(S)/KG/MIN: 12.5 INJECTION INTRAVENOUS at 13:01

## 2024-07-31 RX ADMIN — Medication 400 MILLIGRAM(S): at 23:51

## 2024-07-31 RX ADMIN — Medication 125 MILLILITER(S): at 12:51

## 2024-07-31 RX ADMIN — NITROGLYCERIN 3 MICROGRAM(S)/MIN: 400 AEROSOL, METERED SUBLINGUAL at 19:49

## 2024-07-31 RX ADMIN — Medication 100 MILLIGRAM(S): at 23:51

## 2024-07-31 RX ADMIN — CHLORHEXIDINE GLUCONATE 1 APPLICATION(S): 500 CLOTH TOPICAL at 05:32

## 2024-07-31 RX ADMIN — Medication 15 MILLIGRAM(S): at 17:52

## 2024-07-31 RX ADMIN — Medication 15 MILLIGRAM(S): at 18:00

## 2024-07-31 RX ADMIN — DEXMEDETOMIDINE HYDROCHLORIDE 5.24 MICROGRAM(S)/KG/HR: 4 INJECTION, SOLUTION INTRAVENOUS at 13:01

## 2024-07-31 NOTE — BRIEF OPERATIVE NOTE - OPERATION/FINDINGS
OPCABx3 (LIMA to LAD, RA to OM, SVG to PDA)  DUNCAN with global hypokinesis preop, EF 30% - postop DUNCAN with improving hypokinesis and EF30%    Radial harvest time 20 min, prep time 5 min  EVH time 20 min, prep time 10 min

## 2024-07-31 NOTE — BRIEF OPERATIVE NOTE - NSICDXBRIEFPROCEDURE_GEN_ALL_CORE_FT
PROCEDURES:  CABG, with DUNCAN 31-Jul-2024 12:11:40 OPCABx3 (LIMA to LAD, RA to OM, SVG to PDA) EF 30% Elina Tuttle

## 2024-07-31 NOTE — PROGRESS NOTE ADULT - SUBJECTIVE AND OBJECTIVE BOX
CTICU  CRITICAL  CARE  attending     Hand off received 					   Pertinent clinical, laboratory, radiographic, hemodynamic, echocardiographic, respiratory data, microbiologic data and chart were reviewed and analyzed frequently throughout the course of the day and night  Patient seen and examined with CTS/ SH attending at bedside  Pt is a 64y , Male, HEALTH ISSUES - PROBLEM Dx:      , FAMILY HISTORY:  PAST MEDICAL & SURGICAL HISTORY:  DM (diabetes mellitus)      Cardiac arrest      HTN (hypertension)      HLD (hyperlipidemia)      Status post insertion of percutaneous endoscopic gastrostomy (PEG) tube        Patient is a 64y old  Male who presents with a chief complaint of CAD (30 Jul 2024 12:12)      14 system review was unremarkable    Vital signs, hemodynamic and respiratory parameters were reviewed from the bedside nursing flowsheet.  ICU Vital Signs Last 24 Hrs  T(C): 37.1 (31 Jul 2024 22:57), Max: 37.1 (31 Jul 2024 22:57)  T(F): 98.8 (31 Jul 2024 22:57), Max: 98.8 (31 Jul 2024 22:57)  HR: 100 (31 Jul 2024 23:00) (63 - 100)  BP: 111/57 (31 Jul 2024 07:07) (108/58 - 111/57)  BP(mean): 78 (31 Jul 2024 07:07) (78 - 78)  ABP: 113/33 (31 Jul 2024 23:00) (87/41 - 146/52)  ABP(mean): 58 (31 Jul 2024 23:00) (57 - 92)  RR: 18 (31 Jul 2024 23:00) (12 - 26)  SpO2: 100% (31 Jul 2024 23:00) (95% - 100%)    O2 Parameters below as of 31 Jul 2024 23:00  Patient On (Oxygen Delivery Method): nasal cannula, high flow  O2 Flow (L/min): 40  O2 Concentration (%): 40      Adult Advanced Hemodynamics Last 24 Hrs  CVP(mm Hg): -2 (31 Jul 2024 23:00) (-2 - 14)  CVP(cm H2O): --  CO: --  CI: --  PA: --  PA(mean): --  PCWP: --  SVR: --  SVRI: --  PVR: --  PVRI: --, ABG - ( 31 Jul 2024 21:30 )  pH, Arterial: 7.40  pH, Blood: x     /  pCO2: 38    /  pO2: 156   / HCO3: 24    / Base Excess: -1.1  /  SaO2: 98.8              Mode: HFNC  FiO2: 40    Intake and output was reviewed and the fluid balance was calculated  Daily Height in cm: 172.72 (31 Jul 2024 07:07)    Daily   I&O's Summary    30 Jul 2024 07:01  -  31 Jul 2024 07:00  --------------------------------------------------------  IN: 237 mL / OUT: 0 mL / NET: 237 mL    31 Jul 2024 07:01  -  01 Aug 2024 00:00  --------------------------------------------------------  IN: 1227.6 mL / OUT: 2050 mL / NET: -822.4 mL        All lines and drain sites were assessed  [facility]      POCT Blood Glucose.: 164 mg/dL (31 Jul 2024 21:34)    No acute change in mental status  Auscultation of the chest reveals equal bs  Abdomen is soft  Extremities are warm and well perfused  Wounds appear clean and unremarkable  Antibiotics are periop    labs  CBC Full  -  ( 31 Jul 2024 21:30 )  WBC Count : 7.86 K/uL  RBC Count : 3.50 M/uL  Hemoglobin : 8.3 g/dL  Hematocrit : 26.0 %  Platelet Count - Automated : 137 K/uL  Mean Cell Volume : 74.3 fl  Mean Cell Hemoglobin : 23.7 pg  Mean Cell Hemoglobin Concentration : 31.9 gm/dL  Auto Neutrophil # : x  Auto Lymphocyte # : x  Auto Monocyte # : x  Auto Eosinophil # : x  Auto Basophil # : x  Auto Neutrophil % : x  Auto Lymphocyte % : x  Auto Monocyte % : x  Auto Eosinophil % : x  Auto Basophil % : x    07-31    139  |  102  |  15  ----------------------------<  161<H>  4.1   |  24  |  0.89    Ca    8.5      31 Jul 2024 21:30  Phos  4.0     07-31  Mg     2.1     07-31    TPro  6.1  /  Alb  4.0  /  TBili  0.5  /  DBili  x   /  AST  32  /  ALT  21  /  AlkPhos  60  07-31    PT/INR - ( 31 Jul 2024 21:30 )   PT: 11.5 sec;   INR: 1.01          PTT - ( 31 Jul 2024 21:30 )  PTT:35.5 sec  The current medications were reviewed   MEDICATIONS  (STANDING):  acetaminophen   IVPB .. 1000 milliGRAM(s) IV Intermittent every 6 hours  ascorbic acid 500 milliGRAM(s) Oral two times a day  aspirin enteric coated 81 milliGRAM(s) Oral daily  ceFAZolin   IVPB 2000 milliGRAM(s) IV Intermittent every 8 hours  chlorhexidine 2% Cloths 1 Application(s) Topical daily  clopidogrel Tablet 75 milliGRAM(s) Oral daily  dextrose 5%. 1000 milliLiter(s) (50 mL/Hr) IV Continuous <Continuous>  dextrose 5%. 1000 milliLiter(s) (100 mL/Hr) IV Continuous <Continuous>  dextrose 50% Injectable 25 Gram(s) IV Push once  dextrose 50% Injectable 25 Gram(s) IV Push once  dextrose 50% Injectable 12.5 Gram(s) IV Push once  DOBUTamine Infusion 2.5 MICROgram(s)/kG/Min (5.24 mL/Hr) IV Continuous <Continuous>  gabapentin 100 milliGRAM(s) Oral every 8 hours  glucagon  Injectable 1 milliGRAM(s) IntraMuscular once  heparin   Injectable 5000 Unit(s) SubCutaneous every 8 hours  insulin glargine Injectable (LANTUS) 12 Unit(s) SubCutaneous at bedtime  insulin lispro (ADMELOG) corrective regimen sliding scale   SubCutaneous three times a day before meals  insulin lispro Injectable (ADMELOG) 4 Unit(s) SubCutaneous three times a day before meals  mupirocin 2% Ointment 1 Application(s) Both Nostrils two times a day  nitroglycerin  Infusion 10 MICROgram(s)/Min (3 mL/Hr) IV Continuous <Continuous>  pantoprazole    Tablet 40 milliGRAM(s) Oral before breakfast  sodium chloride 0.9%. 1000 milliLiter(s) (10 mL/Hr) IV Continuous <Continuous>    MEDICATIONS  (PRN):  dextrose Oral Gel 15 Gram(s) Oral once PRN Blood Glucose LESS THAN 70 milliGRAM(s)/deciliter       PROBLEM LIST/ ASSESSMENT:  HEALTH ISSUES - PROBLEM Dx:      ,   Patient is a 64y old  Male who presents with a chief complaint of CAD (30 Jul 2024 12:12)     s/p cardiac surgery    Acute systolic and diastolic heart failure evidenced by SOB and parenchymal infiltrates; will treat with diuresis    Cardiogenic shock on ionotropy      Acute post operative pulmonary insufficiency ruled in due to hypoxemia, O2 sats < 91% on RA treated with HFNC            My plan includes :  close hemodynamic, ventilatory and drain monitoring and management per post op routine    Monitor for arrhythmias and monitor parameters for organ perfusion  beta blockade not administered due to hemodynamic instability and bradycardia  monitor neurologic status  Head of the bed should remain elevated to 45 deg .   chest PT and IS will be encouraged  monitor adequacy of oxygenation and ventilation and attempt to wean oxygen  antibiotic regimen will be tailored to the clinical, laboratory and microbiologic data  Nutritional goals will be met using po eventually , ensure adequate caloric intake and montior the same  Stress ulcer and VTE prophylaxis will be achieved    Glycemic control is satisfactory  Electrolytes have been repleted as necessary and wound care has been carried out. Pain control has been achieved.   agressive physical therapy and early mobility and ambulation goals will be met   The family was updated about the course and plan  CRITICAL CARE TIME Upon my evaluation, this patient had a high probability of imminent or life-threatening deterioration due to the above problems which required my direct attention, intervention, and personal management.  I have personally provided 150 minutes of critical care time exclusive of time spent on separately billable procedures. Time included review of laboratory data, radiology results, discussion with consultants, and monitoring for potential decompensation. Interventions were performed as documented abovepersonally provided by me  in evaluation and management, reassessments, review and interpretation of labs and x-rays, ventilator and hemodynamic management, formulating a plan and coordinating care: ___150___ MIN.  Time does not include procedural time.    CTICU ATTENDING     					    Lcuas Dawkins MD

## 2024-08-01 LAB
ALBUMIN SERPL ELPH-MCNC: 3.9 G/DL — SIGNIFICANT CHANGE UP (ref 3.3–5)
ALBUMIN SERPL ELPH-MCNC: 4 G/DL — SIGNIFICANT CHANGE UP (ref 3.3–5)
ALBUMIN SERPL ELPH-MCNC: 4 G/DL — SIGNIFICANT CHANGE UP (ref 3.3–5)
ALBUMIN SERPL ELPH-MCNC: 4.2 G/DL — SIGNIFICANT CHANGE UP (ref 3.3–5)
ALP SERPL-CCNC: 51 U/L — SIGNIFICANT CHANGE UP (ref 40–120)
ALP SERPL-CCNC: 54 U/L — SIGNIFICANT CHANGE UP (ref 40–120)
ALP SERPL-CCNC: 56 U/L — SIGNIFICANT CHANGE UP (ref 40–120)
ALP SERPL-CCNC: 58 U/L — SIGNIFICANT CHANGE UP (ref 40–120)
ALT FLD-CCNC: 15 U/L — SIGNIFICANT CHANGE UP (ref 10–45)
ALT FLD-CCNC: 16 U/L — SIGNIFICANT CHANGE UP (ref 10–45)
ALT FLD-CCNC: 16 U/L — SIGNIFICANT CHANGE UP (ref 10–45)
ALT FLD-CCNC: 18 U/L — SIGNIFICANT CHANGE UP (ref 10–45)
ANION GAP SERPL CALC-SCNC: 10 MMOL/L — SIGNIFICANT CHANGE UP (ref 5–17)
ANION GAP SERPL CALC-SCNC: 10 MMOL/L — SIGNIFICANT CHANGE UP (ref 5–17)
ANION GAP SERPL CALC-SCNC: 11 MMOL/L — SIGNIFICANT CHANGE UP (ref 5–17)
ANION GAP SERPL CALC-SCNC: 13 MMOL/L — SIGNIFICANT CHANGE UP (ref 5–17)
APTT BLD: 29.4 SEC — SIGNIFICANT CHANGE UP (ref 24.5–35.6)
APTT BLD: 29.9 SEC — SIGNIFICANT CHANGE UP (ref 24.5–35.6)
APTT BLD: 30.8 SEC — SIGNIFICANT CHANGE UP (ref 24.5–35.6)
APTT BLD: 37.7 SEC — HIGH (ref 24.5–35.6)
AST SERPL-CCNC: 27 U/L — SIGNIFICANT CHANGE UP (ref 10–40)
AST SERPL-CCNC: 29 U/L — SIGNIFICANT CHANGE UP (ref 10–40)
AST SERPL-CCNC: 30 U/L — SIGNIFICANT CHANGE UP (ref 10–40)
AST SERPL-CCNC: 30 U/L — SIGNIFICANT CHANGE UP (ref 10–40)
BASE EXCESS BLDV CALC-SCNC: 0.1 MMOL/L — SIGNIFICANT CHANGE UP (ref -2–3)
BASE EXCESS BLDV CALC-SCNC: 0.3 MMOL/L — SIGNIFICANT CHANGE UP (ref -2–3)
BASE EXCESS BLDV CALC-SCNC: 2.5 MMOL/L — SIGNIFICANT CHANGE UP (ref -2–3)
BASOPHILS # BLD AUTO: 0.01 K/UL — SIGNIFICANT CHANGE UP (ref 0–0.2)
BASOPHILS # BLD AUTO: 0.02 K/UL — SIGNIFICANT CHANGE UP (ref 0–0.2)
BASOPHILS # BLD AUTO: 0.03 K/UL — SIGNIFICANT CHANGE UP (ref 0–0.2)
BASOPHILS NFR BLD AUTO: 0.1 % — SIGNIFICANT CHANGE UP (ref 0–2)
BASOPHILS NFR BLD AUTO: 0.3 % — SIGNIFICANT CHANGE UP (ref 0–2)
BASOPHILS NFR BLD AUTO: 0.4 % — SIGNIFICANT CHANGE UP (ref 0–2)
BILIRUB SERPL-MCNC: 0.5 MG/DL — SIGNIFICANT CHANGE UP (ref 0.2–1.2)
BILIRUB SERPL-MCNC: 0.6 MG/DL — SIGNIFICANT CHANGE UP (ref 0.2–1.2)
BILIRUB SERPL-MCNC: 0.6 MG/DL — SIGNIFICANT CHANGE UP (ref 0.2–1.2)
BILIRUB SERPL-MCNC: 0.7 MG/DL — SIGNIFICANT CHANGE UP (ref 0.2–1.2)
BUN SERPL-MCNC: 14 MG/DL — SIGNIFICANT CHANGE UP (ref 7–23)
BUN SERPL-MCNC: 16 MG/DL — SIGNIFICANT CHANGE UP (ref 7–23)
BUN SERPL-MCNC: 16 MG/DL — SIGNIFICANT CHANGE UP (ref 7–23)
BUN SERPL-MCNC: 21 MG/DL — SIGNIFICANT CHANGE UP (ref 7–23)
CA-I SERPL-SCNC: 1.2 MMOL/L — SIGNIFICANT CHANGE UP (ref 1.15–1.33)
CALCIUM SERPL-MCNC: 8.7 MG/DL — SIGNIFICANT CHANGE UP (ref 8.4–10.5)
CALCIUM SERPL-MCNC: 8.7 MG/DL — SIGNIFICANT CHANGE UP (ref 8.4–10.5)
CALCIUM SERPL-MCNC: 8.8 MG/DL — SIGNIFICANT CHANGE UP (ref 8.4–10.5)
CALCIUM SERPL-MCNC: 9 MG/DL — SIGNIFICANT CHANGE UP (ref 8.4–10.5)
CHLORIDE SERPL-SCNC: 102 MMOL/L — SIGNIFICANT CHANGE UP (ref 96–108)
CHLORIDE SERPL-SCNC: 102 MMOL/L — SIGNIFICANT CHANGE UP (ref 96–108)
CHLORIDE SERPL-SCNC: 103 MMOL/L — SIGNIFICANT CHANGE UP (ref 96–108)
CHLORIDE SERPL-SCNC: 105 MMOL/L — SIGNIFICANT CHANGE UP (ref 96–108)
CO2 BLDV-SCNC: 27.4 MMOL/L — HIGH (ref 22–26)
CO2 BLDV-SCNC: 27.4 MMOL/L — HIGH (ref 22–26)
CO2 BLDV-SCNC: 29.3 MMOL/L — HIGH (ref 22–26)
CO2 SERPL-SCNC: 24 MMOL/L — SIGNIFICANT CHANGE UP (ref 22–31)
CO2 SERPL-SCNC: 24 MMOL/L — SIGNIFICANT CHANGE UP (ref 22–31)
CO2 SERPL-SCNC: 25 MMOL/L — SIGNIFICANT CHANGE UP (ref 22–31)
CO2 SERPL-SCNC: 25 MMOL/L — SIGNIFICANT CHANGE UP (ref 22–31)
CORTIS AM PEAK SERPL-MCNC: 11.58 UG/DL — SIGNIFICANT CHANGE UP (ref 6.02–18.4)
CREAT SERPL-MCNC: 0.87 MG/DL — SIGNIFICANT CHANGE UP (ref 0.5–1.3)
CREAT SERPL-MCNC: 0.87 MG/DL — SIGNIFICANT CHANGE UP (ref 0.5–1.3)
CREAT SERPL-MCNC: 0.92 MG/DL — SIGNIFICANT CHANGE UP (ref 0.5–1.3)
CREAT SERPL-MCNC: 0.93 MG/DL — SIGNIFICANT CHANGE UP (ref 0.5–1.3)
EGFR: 93 ML/MIN/1.73M2 — SIGNIFICANT CHANGE UP
EGFR: 94 ML/MIN/1.73M2 — SIGNIFICANT CHANGE UP
EGFR: 98 ML/MIN/1.73M2 — SIGNIFICANT CHANGE UP
EGFR: 98 ML/MIN/1.73M2 — SIGNIFICANT CHANGE UP
EOSINOPHIL # BLD AUTO: 0 K/UL — SIGNIFICANT CHANGE UP (ref 0–0.5)
EOSINOPHIL # BLD AUTO: 0 K/UL — SIGNIFICANT CHANGE UP (ref 0–0.5)
EOSINOPHIL # BLD AUTO: 0.01 K/UL — SIGNIFICANT CHANGE UP (ref 0–0.5)
EOSINOPHIL NFR BLD AUTO: 0 % — SIGNIFICANT CHANGE UP (ref 0–6)
EOSINOPHIL NFR BLD AUTO: 0 % — SIGNIFICANT CHANGE UP (ref 0–6)
EOSINOPHIL NFR BLD AUTO: 0.1 % — SIGNIFICANT CHANGE UP (ref 0–6)
GAS PNL BLDA: SIGNIFICANT CHANGE UP
GAS PNL BLDV: 135 MMOL/L — LOW (ref 136–145)
GAS PNL BLDV: SIGNIFICANT CHANGE UP
GAS PNL BLDV: SIGNIFICANT CHANGE UP
GLUCOSE BLDC GLUCOMTR-MCNC: 173 MG/DL — HIGH (ref 70–99)
GLUCOSE BLDC GLUCOMTR-MCNC: 178 MG/DL — HIGH (ref 70–99)
GLUCOSE BLDC GLUCOMTR-MCNC: 210 MG/DL — HIGH (ref 70–99)
GLUCOSE BLDC GLUCOMTR-MCNC: 235 MG/DL — HIGH (ref 70–99)
GLUCOSE BLDC GLUCOMTR-MCNC: 251 MG/DL — HIGH (ref 70–99)
GLUCOSE SERPL-MCNC: 183 MG/DL — HIGH (ref 70–99)
GLUCOSE SERPL-MCNC: 185 MG/DL — HIGH (ref 70–99)
GLUCOSE SERPL-MCNC: 231 MG/DL — HIGH (ref 70–99)
GLUCOSE SERPL-MCNC: 271 MG/DL — HIGH (ref 70–99)
HCO3 BLDV-SCNC: 26 MMOL/L — SIGNIFICANT CHANGE UP (ref 22–29)
HCO3 BLDV-SCNC: 26 MMOL/L — SIGNIFICANT CHANGE UP (ref 22–29)
HCO3 BLDV-SCNC: 28 MMOL/L — SIGNIFICANT CHANGE UP (ref 22–29)
HCT VFR BLD CALC: 25.1 % — LOW (ref 39–50)
HCT VFR BLD CALC: 25.5 % — LOW (ref 39–50)
HCT VFR BLD CALC: 26.4 % — LOW (ref 39–50)
HCT VFR BLD CALC: 28.9 % — LOW (ref 39–50)
HGB BLD-MCNC: 7.9 G/DL — LOW (ref 13–17)
HGB BLD-MCNC: 8.1 G/DL — LOW (ref 13–17)
HGB BLD-MCNC: 8.5 G/DL — LOW (ref 13–17)
HGB BLD-MCNC: 9.5 G/DL — LOW (ref 13–17)
IMM GRANULOCYTES NFR BLD AUTO: 0.1 % — SIGNIFICANT CHANGE UP (ref 0–0.9)
IMM GRANULOCYTES NFR BLD AUTO: 0.3 % — SIGNIFICANT CHANGE UP (ref 0–0.9)
IMM GRANULOCYTES NFR BLD AUTO: 0.5 % — SIGNIFICANT CHANGE UP (ref 0–0.9)
INR BLD: 1 — SIGNIFICANT CHANGE UP (ref 0.85–1.18)
INR BLD: 1.15 — SIGNIFICANT CHANGE UP (ref 0.85–1.18)
INR BLD: 1.18 — SIGNIFICANT CHANGE UP (ref 0.85–1.18)
INR BLD: 1.24 — HIGH (ref 0.85–1.18)
LACTATE SERPL-SCNC: 0.9 MMOL/L — SIGNIFICANT CHANGE UP (ref 0.5–2)
LACTATE SERPL-SCNC: 1 MMOL/L — SIGNIFICANT CHANGE UP (ref 0.5–2)
LACTATE SERPL-SCNC: 1.2 MMOL/L — SIGNIFICANT CHANGE UP (ref 0.5–2)
LACTATE SERPL-SCNC: 2 MMOL/L — SIGNIFICANT CHANGE UP (ref 0.5–2)
LYMPHOCYTES # BLD AUTO: 0.52 K/UL — LOW (ref 1–3.3)
LYMPHOCYTES # BLD AUTO: 0.87 K/UL — LOW (ref 1–3.3)
LYMPHOCYTES # BLD AUTO: 1.21 K/UL — SIGNIFICANT CHANGE UP (ref 1–3.3)
LYMPHOCYTES # BLD AUTO: 12 % — LOW (ref 13–44)
LYMPHOCYTES # BLD AUTO: 17.1 % — SIGNIFICANT CHANGE UP (ref 13–44)
LYMPHOCYTES # BLD AUTO: 6.9 % — LOW (ref 13–44)
MAGNESIUM SERPL-MCNC: 1.8 MG/DL — SIGNIFICANT CHANGE UP (ref 1.6–2.6)
MAGNESIUM SERPL-MCNC: 1.9 MG/DL — SIGNIFICANT CHANGE UP (ref 1.6–2.6)
MAGNESIUM SERPL-MCNC: 1.9 MG/DL — SIGNIFICANT CHANGE UP (ref 1.6–2.6)
MAGNESIUM SERPL-MCNC: 2 MG/DL — SIGNIFICANT CHANGE UP (ref 1.6–2.6)
MCHC RBC-ENTMCNC: 23.5 PG — LOW (ref 27–34)
MCHC RBC-ENTMCNC: 24.3 PG — LOW (ref 27–34)
MCHC RBC-ENTMCNC: 24.8 PG — LOW (ref 27–34)
MCHC RBC-ENTMCNC: 26 PG — LOW (ref 27–34)
MCHC RBC-ENTMCNC: 31.5 GM/DL — LOW (ref 32–36)
MCHC RBC-ENTMCNC: 31.8 GM/DL — LOW (ref 32–36)
MCHC RBC-ENTMCNC: 32.2 GM/DL — SIGNIFICANT CHANGE UP (ref 32–36)
MCHC RBC-ENTMCNC: 32.9 GM/DL — SIGNIFICANT CHANGE UP (ref 32–36)
MCV RBC AUTO: 74.7 FL — LOW (ref 80–100)
MCV RBC AUTO: 76.3 FL — LOW (ref 80–100)
MCV RBC AUTO: 77 FL — LOW (ref 80–100)
MCV RBC AUTO: 79 FL — LOW (ref 80–100)
MONOCYTES # BLD AUTO: 0.55 K/UL — SIGNIFICANT CHANGE UP (ref 0–0.9)
MONOCYTES # BLD AUTO: 0.8 K/UL — SIGNIFICANT CHANGE UP (ref 0–0.9)
MONOCYTES # BLD AUTO: 0.82 K/UL — SIGNIFICANT CHANGE UP (ref 0–0.9)
MONOCYTES NFR BLD AUTO: 11.3 % — SIGNIFICANT CHANGE UP (ref 2–14)
MONOCYTES NFR BLD AUTO: 11.3 % — SIGNIFICANT CHANGE UP (ref 2–14)
MONOCYTES NFR BLD AUTO: 7.3 % — SIGNIFICANT CHANGE UP (ref 2–14)
NEUTROPHILS # BLD AUTO: 5.02 K/UL — SIGNIFICANT CHANGE UP (ref 1.8–7.4)
NEUTROPHILS # BLD AUTO: 5.53 K/UL — SIGNIFICANT CHANGE UP (ref 1.8–7.4)
NEUTROPHILS # BLD AUTO: 6.4 K/UL — SIGNIFICANT CHANGE UP (ref 1.8–7.4)
NEUTROPHILS NFR BLD AUTO: 70.8 % — SIGNIFICANT CHANGE UP (ref 43–77)
NEUTROPHILS NFR BLD AUTO: 76.3 % — SIGNIFICANT CHANGE UP (ref 43–77)
NEUTROPHILS NFR BLD AUTO: 85.2 % — HIGH (ref 43–77)
NRBC # BLD: 0 /100 WBCS — SIGNIFICANT CHANGE UP (ref 0–0)
PCO2 BLDV: 45 MMHG — SIGNIFICANT CHANGE UP (ref 42–55)
PCO2 BLDV: 45 MMHG — SIGNIFICANT CHANGE UP (ref 42–55)
PCO2 BLDV: 46 MMHG — SIGNIFICANT CHANGE UP (ref 42–55)
PH BLDV: 7.36 — SIGNIFICANT CHANGE UP (ref 7.32–7.43)
PH BLDV: 7.37 — SIGNIFICANT CHANGE UP (ref 7.32–7.43)
PH BLDV: 7.4 — SIGNIFICANT CHANGE UP (ref 7.32–7.43)
PHOSPHATE SERPL-MCNC: 1.7 MG/DL — LOW (ref 2.5–4.5)
PHOSPHATE SERPL-MCNC: 2.8 MG/DL — SIGNIFICANT CHANGE UP (ref 2.5–4.5)
PHOSPHATE SERPL-MCNC: 3.1 MG/DL — SIGNIFICANT CHANGE UP (ref 2.5–4.5)
PHOSPHATE SERPL-MCNC: 4.5 MG/DL — SIGNIFICANT CHANGE UP (ref 2.5–4.5)
PLATELET # BLD AUTO: 103 K/UL — LOW (ref 150–400)
PLATELET # BLD AUTO: 121 K/UL — LOW (ref 150–400)
PLATELET # BLD AUTO: 128 K/UL — LOW (ref 150–400)
PLATELET # BLD AUTO: 156 K/UL — SIGNIFICANT CHANGE UP (ref 150–400)
PO2 BLDV: 36 MMHG — SIGNIFICANT CHANGE UP (ref 25–45)
PO2 BLDV: 39 MMHG — SIGNIFICANT CHANGE UP (ref 25–45)
PO2 BLDV: 44 MMHG — SIGNIFICANT CHANGE UP (ref 25–45)
POTASSIUM BLDV-SCNC: 4.5 MMOL/L — SIGNIFICANT CHANGE UP (ref 3.5–5.1)
POTASSIUM SERPL-MCNC: 4 MMOL/L — SIGNIFICANT CHANGE UP (ref 3.5–5.3)
POTASSIUM SERPL-MCNC: 4.2 MMOL/L — SIGNIFICANT CHANGE UP (ref 3.5–5.3)
POTASSIUM SERPL-MCNC: 4.2 MMOL/L — SIGNIFICANT CHANGE UP (ref 3.5–5.3)
POTASSIUM SERPL-MCNC: 4.5 MMOL/L — SIGNIFICANT CHANGE UP (ref 3.5–5.3)
POTASSIUM SERPL-SCNC: 4 MMOL/L — SIGNIFICANT CHANGE UP (ref 3.5–5.3)
POTASSIUM SERPL-SCNC: 4.2 MMOL/L — SIGNIFICANT CHANGE UP (ref 3.5–5.3)
POTASSIUM SERPL-SCNC: 4.2 MMOL/L — SIGNIFICANT CHANGE UP (ref 3.5–5.3)
POTASSIUM SERPL-SCNC: 4.5 MMOL/L — SIGNIFICANT CHANGE UP (ref 3.5–5.3)
PROT SERPL-MCNC: 6 G/DL — SIGNIFICANT CHANGE UP (ref 6–8.3)
PROT SERPL-MCNC: 6 G/DL — SIGNIFICANT CHANGE UP (ref 6–8.3)
PROT SERPL-MCNC: 6.3 G/DL — SIGNIFICANT CHANGE UP (ref 6–8.3)
PROT SERPL-MCNC: 6.3 G/DL — SIGNIFICANT CHANGE UP (ref 6–8.3)
PROTHROM AB SERPL-ACNC: 11.4 SEC — SIGNIFICANT CHANGE UP (ref 9.5–13)
PROTHROM AB SERPL-ACNC: 13.1 SEC — HIGH (ref 9.5–13)
PROTHROM AB SERPL-ACNC: 13.4 SEC — HIGH (ref 9.5–13)
PROTHROM AB SERPL-ACNC: 14 SEC — HIGH (ref 9.5–13)
RBC # BLD: 3.34 M/UL — LOW (ref 4.2–5.8)
RBC # BLD: 3.36 M/UL — LOW (ref 4.2–5.8)
RBC # BLD: 3.43 M/UL — LOW (ref 4.2–5.8)
RBC # BLD: 3.66 M/UL — LOW (ref 4.2–5.8)
RBC # FLD: 17.1 % — HIGH (ref 10.3–14.5)
RBC # FLD: 17.5 % — HIGH (ref 10.3–14.5)
RBC # FLD: 17.9 % — HIGH (ref 10.3–14.5)
RBC # FLD: 18.2 % — HIGH (ref 10.3–14.5)
SAO2 % BLDV: 62.6 % — LOW (ref 67–88)
SAO2 % BLDV: 71.6 % — SIGNIFICANT CHANGE UP (ref 67–88)
SAO2 % BLDV: 76.8 % — SIGNIFICANT CHANGE UP (ref 67–88)
SODIUM SERPL-SCNC: 137 MMOL/L — SIGNIFICANT CHANGE UP (ref 135–145)
SODIUM SERPL-SCNC: 137 MMOL/L — SIGNIFICANT CHANGE UP (ref 135–145)
SODIUM SERPL-SCNC: 138 MMOL/L — SIGNIFICANT CHANGE UP (ref 135–145)
SODIUM SERPL-SCNC: 142 MMOL/L — SIGNIFICANT CHANGE UP (ref 135–145)
WBC # BLD: 7.09 K/UL — SIGNIFICANT CHANGE UP (ref 3.8–10.5)
WBC # BLD: 7.25 K/UL — SIGNIFICANT CHANGE UP (ref 3.8–10.5)
WBC # BLD: 7.52 K/UL — SIGNIFICANT CHANGE UP (ref 3.8–10.5)
WBC # BLD: 7.94 K/UL — SIGNIFICANT CHANGE UP (ref 3.8–10.5)
WBC # FLD AUTO: 7.09 K/UL — SIGNIFICANT CHANGE UP (ref 3.8–10.5)
WBC # FLD AUTO: 7.25 K/UL — SIGNIFICANT CHANGE UP (ref 3.8–10.5)
WBC # FLD AUTO: 7.52 K/UL — SIGNIFICANT CHANGE UP (ref 3.8–10.5)
WBC # FLD AUTO: 7.94 K/UL — SIGNIFICANT CHANGE UP (ref 3.8–10.5)

## 2024-08-01 PROCEDURE — 99291 CRITICAL CARE FIRST HOUR: CPT

## 2024-08-01 PROCEDURE — 99292 CRITICAL CARE ADDL 30 MIN: CPT

## 2024-08-01 PROCEDURE — 71045 X-RAY EXAM CHEST 1 VIEW: CPT | Mod: 26,76

## 2024-08-01 PROCEDURE — 71045 X-RAY EXAM CHEST 1 VIEW: CPT | Mod: 26,77

## 2024-08-01 PROCEDURE — 99291 CRITICAL CARE FIRST HOUR: CPT | Mod: GC

## 2024-08-01 RX ORDER — MAGNESIUM SULFATE 500 MG/ML
2 VIAL (ML) INJECTION ONCE
Refills: 0 | Status: COMPLETED | OUTPATIENT
Start: 2024-08-01 | End: 2024-08-01

## 2024-08-01 RX ORDER — POTASSIUM CHLORIDE 1500 MG/1
20 TABLET, EXTENDED RELEASE ORAL DAILY
Refills: 0 | Status: DISCONTINUED | OUTPATIENT
Start: 2024-08-01 | End: 2024-08-02

## 2024-08-01 RX ORDER — HYDROCORTISONE ACETATE
50 POWDER (GRAM) MISCELLANEOUS EVERY 12 HOURS
Refills: 0 | Status: DISCONTINUED | OUTPATIENT
Start: 2024-08-02 | End: 2024-08-02

## 2024-08-01 RX ORDER — MAGNESIUM OXIDE 253 MG/1
400 TABLET ORAL ONCE
Refills: 0 | Status: COMPLETED | OUTPATIENT
Start: 2024-08-01 | End: 2024-08-01

## 2024-08-01 RX ORDER — HYDROCORTISONE ACETATE
75 POWDER (GRAM) MISCELLANEOUS EVERY 8 HOURS
Refills: 0 | Status: DISCONTINUED | OUTPATIENT
Start: 2024-08-01 | End: 2024-08-01

## 2024-08-01 RX ORDER — LIDOCAINE 5% 5 G/100G
1 CREAM TOPICAL DAILY
Refills: 0 | Status: DISCONTINUED | OUTPATIENT
Start: 2024-08-01 | End: 2024-08-05

## 2024-08-01 RX ORDER — MAGNESIUM SULFATE 500 MG/ML
2 VIAL (ML) INJECTION ONCE
Refills: 0 | Status: DISCONTINUED | OUTPATIENT
Start: 2024-08-01 | End: 2024-08-01

## 2024-08-01 RX ORDER — FUROSEMIDE 10 MG/ML
20 INJECTION, SOLUTION INTRAVENOUS EVERY 6 HOURS
Refills: 0 | Status: DISCONTINUED | OUTPATIENT
Start: 2024-08-01 | End: 2024-08-02

## 2024-08-01 RX ORDER — INSULIN GLARGINE-YFGN 100 [IU]/ML
14 INJECTION, SOLUTION SUBCUTANEOUS AT BEDTIME
Refills: 0 | Status: DISCONTINUED | OUTPATIENT
Start: 2024-08-01 | End: 2024-08-05

## 2024-08-01 RX ORDER — ALBUMIN HUMAN 25 %
250 INTRAVENOUS SOLUTION INTRAVENOUS
Refills: 0 | Status: COMPLETED | OUTPATIENT
Start: 2024-08-01 | End: 2024-08-01

## 2024-08-01 RX ORDER — ATORVASTATIN CALCIUM 40 MG/1
80 TABLET, FILM COATED ORAL AT BEDTIME
Refills: 0 | Status: DISCONTINUED | OUTPATIENT
Start: 2024-08-01 | End: 2024-08-05

## 2024-08-01 RX ORDER — ACETAMINOPHEN 500 MG
1000 TABLET ORAL EVERY 6 HOURS
Refills: 0 | Status: COMPLETED | OUTPATIENT
Start: 2024-08-01 | End: 2024-08-04

## 2024-08-01 RX ORDER — FUROSEMIDE 10 MG/ML
20 INJECTION, SOLUTION INTRAVENOUS EVERY 12 HOURS
Refills: 0 | Status: DISCONTINUED | OUTPATIENT
Start: 2024-08-01 | End: 2024-08-01

## 2024-08-01 RX ORDER — DEXMEDETOMIDINE HYDROCHLORIDE 4 UG/ML
0.1 INJECTION, SOLUTION INTRAVENOUS
Qty: 400 | Refills: 0 | Status: DISCONTINUED | OUTPATIENT
Start: 2024-08-01 | End: 2024-08-01

## 2024-08-01 RX ORDER — FUROSEMIDE 10 MG/ML
10 INJECTION, SOLUTION INTRAVENOUS ONCE
Refills: 0 | Status: COMPLETED | OUTPATIENT
Start: 2024-08-01 | End: 2024-08-01

## 2024-08-01 RX ORDER — DEXTROSE MONOHYDRATE, SODIUM CHLORIDE, SODIUM LACTATE, CALCIUM CHLORIDE, MAGNESIUM CHLORIDE 1.5; 538; 448; 18.4; 5.08 G/100ML; MG/100ML; MG/100ML; MG/100ML; MG/100ML
500 SOLUTION INTRAPERITONEAL ONCE
Refills: 0 | Status: COMPLETED | OUTPATIENT
Start: 2024-08-01 | End: 2024-08-01

## 2024-08-01 RX ORDER — INSULIN LISPRO 100/ML
6 VIAL (ML) SUBCUTANEOUS
Refills: 0 | Status: DISCONTINUED | OUTPATIENT
Start: 2024-08-01 | End: 2024-08-05

## 2024-08-01 RX ORDER — SODIUM PHOSPHATE, MONOBASIC, MONOHYDRATE 276; 142 MG/ML; MG/ML
15 INJECTION, SOLUTION INTRAVENOUS ONCE
Refills: 0 | Status: COMPLETED | OUTPATIENT
Start: 2024-08-01 | End: 2024-08-01

## 2024-08-01 RX ORDER — MIDODRINE HYDROCHLORIDE 2.5 MG/1
10 TABLET ORAL EVERY 8 HOURS
Refills: 0 | Status: DISCONTINUED | OUTPATIENT
Start: 2024-08-01 | End: 2024-08-01

## 2024-08-01 RX ADMIN — PANTOPRAZOLE SODIUM 40 MILLIGRAM(S): 20 TABLET, DELAYED RELEASE ORAL at 06:20

## 2024-08-01 RX ADMIN — Medication 6 UNIT(S): at 17:13

## 2024-08-01 RX ADMIN — HEPARIN SODIUM 5000 UNIT(S): 1000 INJECTION, SOLUTION INTRAVENOUS; SUBCUTANEOUS at 06:20

## 2024-08-01 RX ADMIN — GABAPENTIN 100 MILLIGRAM(S): 400 CAPSULE ORAL at 15:30

## 2024-08-01 RX ADMIN — MUPIROCIN CALCIUM 1 APPLICATION(S): 20 CREAM TOPICAL at 19:02

## 2024-08-01 RX ADMIN — Medication 81 MILLIGRAM(S): at 12:10

## 2024-08-01 RX ADMIN — Medication 1000 MILLIGRAM(S): at 22:05

## 2024-08-01 RX ADMIN — CHLORHEXIDINE GLUCONATE 1 APPLICATION(S): 500 CLOTH TOPICAL at 12:14

## 2024-08-01 RX ADMIN — Medication 500 MILLIGRAM(S): at 06:20

## 2024-08-01 RX ADMIN — Medication 25 GRAM(S): at 23:03

## 2024-08-01 RX ADMIN — CLOPIDOGREL BISULFATE 75 MILLIGRAM(S): 75 TABLET, FILM COATED ORAL at 12:10

## 2024-08-01 RX ADMIN — POTASSIUM CHLORIDE 20 MILLIEQUIVALENT(S): 1500 TABLET, EXTENDED RELEASE ORAL at 15:38

## 2024-08-01 RX ADMIN — Medication 2: at 17:13

## 2024-08-01 RX ADMIN — Medication 4 UNIT(S): at 11:54

## 2024-08-01 RX ADMIN — Medication 1000 MILLIGRAM(S): at 17:12

## 2024-08-01 RX ADMIN — INSULIN GLARGINE-YFGN 14 UNIT(S): 100 INJECTION, SOLUTION SUBCUTANEOUS at 22:04

## 2024-08-01 RX ADMIN — LIDOCAINE 5% 1 PATCH: 5 CREAM TOPICAL at 07:07

## 2024-08-01 RX ADMIN — Medication 1000 MILLIGRAM(S): at 06:22

## 2024-08-01 RX ADMIN — Medication 100 MILLIGRAM(S): at 22:04

## 2024-08-01 RX ADMIN — DEXMEDETOMIDINE HYDROCHLORIDE 1.75 MICROGRAM(S)/KG/HR: 4 INJECTION, SOLUTION INTRAVENOUS at 00:39

## 2024-08-01 RX ADMIN — LIDOCAINE 5% 1 PATCH: 5 CREAM TOPICAL at 06:21

## 2024-08-01 RX ADMIN — Medication 1000 MILLIGRAM(S): at 17:28

## 2024-08-01 RX ADMIN — ATORVASTATIN CALCIUM 80 MILLIGRAM(S): 40 TABLET, FILM COATED ORAL at 22:04

## 2024-08-01 RX ADMIN — Medication 1000 MILLIGRAM(S): at 23:00

## 2024-08-01 RX ADMIN — Medication 250 MILLILITER(S): at 06:21

## 2024-08-01 RX ADMIN — GABAPENTIN 100 MILLIGRAM(S): 400 CAPSULE ORAL at 06:20

## 2024-08-01 RX ADMIN — Medication 4: at 07:10

## 2024-08-01 RX ADMIN — MUPIROCIN CALCIUM 1 APPLICATION(S): 20 CREAM TOPICAL at 06:08

## 2024-08-01 RX ADMIN — Medication 4 UNIT(S): at 07:10

## 2024-08-01 RX ADMIN — Medication 400 MILLIGRAM(S): at 12:10

## 2024-08-01 RX ADMIN — FUROSEMIDE 20 MILLIGRAM(S): 10 INJECTION, SOLUTION INTRAVENOUS at 22:04

## 2024-08-01 RX ADMIN — MIDODRINE HYDROCHLORIDE 10 MILLIGRAM(S): 2.5 TABLET ORAL at 15:31

## 2024-08-01 RX ADMIN — Medication 100 MILLIGRAM(S): at 15:38

## 2024-08-01 RX ADMIN — GABAPENTIN 100 MILLIGRAM(S): 400 CAPSULE ORAL at 22:05

## 2024-08-01 RX ADMIN — Medication 1000 MILLIGRAM(S): at 13:00

## 2024-08-01 RX ADMIN — Medication 75 MILLIGRAM(S): at 15:38

## 2024-08-01 RX ADMIN — HEPARIN SODIUM 5000 UNIT(S): 1000 INJECTION, SOLUTION INTRAVENOUS; SUBCUTANEOUS at 22:04

## 2024-08-01 RX ADMIN — Medication 400 MILLIGRAM(S): at 06:20

## 2024-08-01 RX ADMIN — SODIUM PHOSPHATE, MONOBASIC, MONOHYDRATE 62.5 MILLIMOLE(S): 276; 142 INJECTION, SOLUTION INTRAVENOUS at 23:09

## 2024-08-01 RX ADMIN — Medication 4: at 22:04

## 2024-08-01 RX ADMIN — Medication 6: at 11:55

## 2024-08-01 RX ADMIN — HEPARIN SODIUM 5000 UNIT(S): 1000 INJECTION, SOLUTION INTRAVENOUS; SUBCUTANEOUS at 14:00

## 2024-08-01 RX ADMIN — Medication 1000 MILLIGRAM(S): at 00:13

## 2024-08-01 RX ADMIN — MAGNESIUM OXIDE 400 MILLIGRAM(S): 253 TABLET ORAL at 15:38

## 2024-08-01 RX ADMIN — Medication 500 MILLIGRAM(S): at 17:12

## 2024-08-01 RX ADMIN — DEXTROSE MONOHYDRATE, SODIUM CHLORIDE, SODIUM LACTATE, CALCIUM CHLORIDE, MAGNESIUM CHLORIDE 750 MILLILITER(S): 1.5; 538; 448; 18.4; 5.08 SOLUTION INTRAPERITONEAL at 07:53

## 2024-08-01 RX ADMIN — Medication 200 GRAM(S): at 22:05

## 2024-08-01 RX ADMIN — MAGNESIUM OXIDE 400 MILLIGRAM(S): 253 TABLET ORAL at 12:38

## 2024-08-01 RX ADMIN — LIDOCAINE 5% 1 PATCH: 5 CREAM TOPICAL at 19:02

## 2024-08-01 RX ADMIN — Medication 100 MILLIGRAM(S): at 07:15

## 2024-08-01 RX ADMIN — FUROSEMIDE 20 MILLIGRAM(S): 10 INJECTION, SOLUTION INTRAVENOUS at 15:48

## 2024-08-01 RX ADMIN — Medication 17 GRAM(S): at 12:09

## 2024-08-01 NOTE — DIETITIAN INITIAL EVALUATION ADULT - OTHER INFO
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 Ardon for surgical evaluation. Presents today for heart failure optimization and completion of pre-surgical testing in anticipation for OR. S/p OPCAB 7/31    Patient seen for nutrition assessment. Received OOB in chair on HFNC, MAP 64, /40. Labs and medications reviewed. Electrolytes WDL, glucose 156-271 x 24 hours, HgbA1c 6.9%<H>. Ordered for abx, 12U lantus at bedtime, 4U premeal admelog TID, miralax, senna, vitamin C, protonix. No pressure injuries documented, surgical sx MSI, +1 generalized edema. No report of GI distress, however last BM PTA per flowsheets- on bowel regimen. Current diet order: DASH/TLC, Consistent Carbohydrate. Confirms NKFA. No difficulty chewing/swallowing reported. Reports good appetite, consumed 100% of breakfast which consisted of oatmeal, avocado toast, yogurt. PTA, patient endorses good appetite at baseline. Typical intake includes oatmeal, eggs, or yogurt for breakfast, tuna sandwiches, avocado toast, or soup for lunch, and salad with chicken, fish, or lentils for dinner. Dosing weight: 154 pounds, BMI 23.4, reports UBW of 154 pounds and denies recent weight loss. Observed with no overt signs and symptoms of muscle or fat wasting. Based on ASPEN guidelines, pt does not meet criteria for malnutrition. Nutrition education provided. Encouraged adequate PO intake with emphasis on protein for post-op healing, reviewed sources of protein. See nutrition recommendations below.

## 2024-08-01 NOTE — DIETITIAN INITIAL EVALUATION ADULT - PERTINENT MEDS FT
MEDICATIONS  (STANDING):  acetaminophen     Tablet .. 1000 milliGRAM(s) Oral every 6 hours  acetaminophen   IVPB .. 1000 milliGRAM(s) IV Intermittent every 6 hours  ascorbic acid 500 milliGRAM(s) Oral two times a day  aspirin enteric coated 81 milliGRAM(s) Oral daily  atorvastatin 80 milliGRAM(s) Oral at bedtime  ceFAZolin   IVPB 2000 milliGRAM(s) IV Intermittent every 8 hours  chlorhexidine 2% Cloths 1 Application(s) Topical daily  clopidogrel Tablet 75 milliGRAM(s) Oral daily  dextrose 5%. 1000 milliLiter(s) (50 mL/Hr) IV Continuous <Continuous>  dextrose 5%. 1000 milliLiter(s) (100 mL/Hr) IV Continuous <Continuous>  dextrose 50% Injectable 12.5 Gram(s) IV Push once  dextrose 50% Injectable 25 Gram(s) IV Push once  dextrose 50% Injectable 25 Gram(s) IV Push once  DOBUTamine Infusion 1.5 MICROgram(s)/kG/Min (3.15 mL/Hr) IV Continuous <Continuous>  gabapentin 100 milliGRAM(s) Oral every 8 hours  glucagon  Injectable 1 milliGRAM(s) IntraMuscular once  heparin   Injectable 5000 Unit(s) SubCutaneous every 8 hours  insulin glargine Injectable (LANTUS) 12 Unit(s) SubCutaneous at bedtime  insulin lispro (ADMELOG) corrective regimen sliding scale   SubCutaneous Before meals and at bedtime  insulin lispro Injectable (ADMELOG) 4 Unit(s) SubCutaneous three times a day before meals  lidocaine   4% Patch 1 Patch Transdermal daily  midodrine 10 milliGRAM(s) Oral every 8 hours  mupirocin 2% Ointment 1 Application(s) Both Nostrils two times a day  pantoprazole    Tablet 40 milliGRAM(s) Oral before breakfast  polyethylene glycol 3350 17 Gram(s) Oral daily  senna 2 Tablet(s) Oral at bedtime  sodium chloride 0.9%. 1000 milliLiter(s) (10 mL/Hr) IV Continuous <Continuous>    MEDICATIONS  (PRN):  dextrose Oral Gel 15 Gram(s) Oral once PRN Blood Glucose LESS THAN 70 milliGRAM(s)/deciliter

## 2024-08-01 NOTE — DIETITIAN INITIAL EVALUATION ADULT - NSFNSGIIOFT_GEN_A_CORE
07-31-24 @ 07:01 - 08-01-24 @ 07:00  --------------------------------------------------------  OUT:    Chest Tube (mL): 95 mL  Total OUT: 95 mL    Total NET: -95 mL      08-01-24 @ 07:01  -  08-01-24 @ 11:40  --------------------------------------------------------  OUT:    Chest Tube (mL): 25 mL  Total OUT: 25 mL    Total NET: -25 mL

## 2024-08-01 NOTE — DIETITIAN INITIAL EVALUATION ADULT - NS FNS DIET ORDER
Diet, DASH/TLC:   Sodium & Cholesterol Restricted  Consistent Carbohydrate {No Snacks} (CSTCHO) (07-31-24 @ 19:29)

## 2024-08-01 NOTE — PROGRESS NOTE ADULT - ASSESSMENT
64M with stage C ICM (5.4cm, LVEF 30-35%), moderate cpcPH, stroke, DM2, severe 3VD now s/p elective CABG () x3 (LIMA-LAD, RA-OM, SVG-PDA).      Review:  TTE 2024: LV 5.4 cm, LVEF 30-35%, LVOT VTI 16 cm, normal RV size/function, mild AI, PASP 60 mmHg, IVC small/collapsing  [facility] 2024: severe 3v CAD with LM involvement  [facility] 2024: RA 5, PA 59/22 (35), PCWP 17, Iris CO/CI 5.1/2.8, PVR 3.6  Cardiac MRI 2024: LVEF 26%, RVEF 54%, subtle subendocardial LGE     #Post-cardiotomy shock  -c.w  3.5  -Low dose levo as needed  -If by  unable to wean off pressors would favor insertion of swan  -Monitor perfusion markers and if rising would favor swan  -check central venous sats    #Chronic systolic HF - ischemic w/ viability on MRI  -GDMT: On hold, on pressors. Restart post-op  -Diuretics: CVP 3 this AM. No further diuretics now  -Device: premature    #cpcPH: Likely underlying lung disease in addition to heart failure 64M with stage C ICM (5.4cm, LVEF 30-35%), moderate cpcPH, stroke, DM2, severe 3VD now s/p elective CABG () x3 (LIMA-LAD, RA-OM, SVG-PDA).      Review:  TTE 2024: LV 5.4 cm, LVEF 30-35%, LVOT VTI 16 cm, normal RV size/function, mild AI, PASP 60 mmHg, IVC small/collapsing  [facility] 2024: severe 3v CAD with LM involvement  [facility] 2024: RA 5, PA 59/22 (35), PCWP 17, Iris CO/CI 5.1/2.8, PVR 3.6  Cardiac MRI 2024: LVEF 26%, RVEF 54%, subtle subendocardial LGE     #Post-cardiotomy shock    -c.w  3.5  -Low dose levo as needed  -If by  unable to wean off pressors would favor insertion of swan  -Monitor perfusion markers and if rising would favor swan  -check central venous sats -- Iris CO 9.5, CI 5.2 today.    #Chronic systolic HF - ischemic w/ viability on MRI  -GDMT: On hold, on pressors. Restart post-op  -Diuretics: CVP 3 this AM. No further diuretics now  -Device: premature    #cpcPH: Likely underlying lung disease in addition to heart failure

## 2024-08-01 NOTE — PROGRESS NOTE ADULT - ATTENDING COMMENTS
63 YO M with a history of ACC/AHA Stage C ICM (LV 5.4 cm, LVEF 30-35%), moderate cpcPH, prior CVA, well controlled DM2, and known severe 3v CAD who presents for elective CABG. He was transferred from [facility] 6/2024 for surgical evaluation where plan was to pursue as outpatient and since then has tolerated GDMT with improvement in BNP levels and NYHA functional class. He is now s/p 3v CABG by Dr. Ardon on 7/31. He is recovering well though hypotensive and requiring low doses of inopressors at this time.     REVIEW OF STUDIES  TTE 7/29/2024: LV 5.4 cm, LVEF 30-35%, LVOT VTI 16 cm, normal RV size/function, mild AI, PASP 60 mmHg, IVC small/collapsing  [facility] 6/2024: severe 3v CAD with LM involvement  [facility] 6/2024: RA 5, PA 59/22 (35), PCWP 17, Iris CO/CI 5.1/2.8, PVR 3.6  Cardiac MRI 6/2024: LVEF 26%, RVEF 54%, subtle subendocardial LGE     PLAN  # Post-op shock  - low CVP and high central venous saturation argue against cardiogenic shock but would place PAC tomorrow if unable to wean low dose levophed  - continue dobutamine 3.5 for now, follow perfusion markers and central venous saturations which are reassuring at this time     # Chronic systolic heart failure  - Etiology: ischemic, now s/p revascularization  - GDMT: will reintroduce as tolerates  - Diuretics: euvolemic off diuretics, goal CVP 8-10  - Device: premature     # CAD  - now s/p CABG  - on DAPT per VTS  - continue statin

## 2024-08-01 NOTE — DIETITIAN INITIAL EVALUATION ADULT - OTHER CALCULATIONS
pounds, %%  Pt within % IBW thus actual body weight used for all calculations. Needs adjusted for age, post-op healing.

## 2024-08-01 NOTE — DIETITIAN INITIAL EVALUATION ADULT - PERTINENT LABORATORY DATA
08-01    137  |  102  |  16  ----------------------------<  271<H>  4.2   |  25  |  0.87    Ca    8.7      01 Aug 2024 09:10  Phos  3.1     08-01  Mg     1.9     08-01    TPro  6.0  /  Alb  3.9  /  TBili  0.6  /  DBili  x   /  AST  27  /  ALT  16  /  AlkPhos  51  08-01  POCT Blood Glucose.: 210 mg/dL (08-01-24 @ 07:03)  A1C with Estimated Average Glucose Result: 6.9 % (07-28-24 @ 14:55)  A1C with Estimated Average Glucose Result: 6.5 % (06-04-24 @ 19:43)

## 2024-08-01 NOTE — PHYSICAL THERAPY INITIAL EVALUATION ADULT - PERTINENT HX OF CURRENT PROBLEM, REHAB EVAL
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 Ardon for surgical evaluation. Presents today for heart failure optimization and completion of pre-surgical testing in anticipation for OR. On arrival, pt feels well no complaints and Denies any chest pain, palpitations, orthopnea, dyspnea on exertion, shortness of breath, wheezing, abd pain, nausea, vomiting, constipation, lightheadedness, headaches, fevers, or chills.

## 2024-08-01 NOTE — DIETITIAN INITIAL EVALUATION ADULT - ADD RECOMMEND
No s/s hyperglycemia. VSS. Patient A&O4. Patient denies CP, SOB. Patient C/O mild palpitations. Patient returned to NSR HR 70s without intervention. Patient stated palpitations resolved. VSS. VSS. Patient denies symptoms besides polyuria. Pt aox4, responsive, no s/s of distress observed/reported. C/O 10/10 chest pain which resolved within 5mins. VSS. patient aox4;vss. denies cp, sob, dizziness, palps. 1. Continue DASH/TLC, Consistent Carbohydrate diet   2. Encourage and monitor PO intake, honor preferences as able   3. Monitor labs, wt trends, GI function, and skin integrity   4. Pain and bowel regimen per team   5. RD to remain available for additional nutrition interventions and diet edu as needed

## 2024-08-01 NOTE — DIETITIAN INITIAL EVALUATION ADULT - PERSON TAUGHT/METHOD
Encouraged adequate PO intake with emphasis on protein for post-op healing, sources of protein/verbal instruction/patient instructed

## 2024-08-01 NOTE — PHYSICAL THERAPY INITIAL EVALUATION ADULT - GENERAL OBSERVATIONS, REHAB EVAL
PT IE completed. Pt received seated in bedside chair, +tele, +a line, +central line, +2 blakes, +pacing wires, +rick, +HFNC PT IE completed. Pt received seated in bedside chair, +tele, +a line, +central line, +2 blakes, +pacing wires, +rick, +HFNC, +Prevena

## 2024-08-01 NOTE — PROGRESS NOTE ADULT - SUBJECTIVE AND OBJECTIVE BOX
INTERVAL EVENTS: No o/n events. Denies CP, dyspnea, palpitations, presyncope, syncope, f/c/n/v.     REVIEW OF SYSTEMS:  10 systems reviewed and negative unless otherwise stated.      PHYSICAL EXAM:  GENERAL: No acute distress  ENT: JVP cmH2O  CHEST/LUNG: Clear to auscultation bilaterally  HEART:  No murmurs, S1+s2   ABDOMEN: Soft, Nontender  EXTREMITIES: Warm and well perfused. No edema  NEUROLOGY: AAOx3    LABS:                        7.9    7.94  )-----------( 156      ( 01 Aug 2024 02:11 )             25.1     08-01    142  |  105  |  14  ----------------------------<  183<H>  4.0   |  24  |  0.93    Ca    9.0      01 Aug 2024 02:11  Phos  4.5     08-01  Mg     2.0     08-01    TPro  6.0  /  Alb  4.0  /  TBili  0.5  /  DBili  x   /  AST  30  /  ALT  18  /  AlkPhos  58  08-01      Radiographic Imaging, ECG, echocardiography personally reviewed.           INTERVAL EVENTS: s/p CABG 7/31      PHYSICAL EXAM:  GENERAL: No acute distress  ENT: RIJ central line  CHEST/LUNG: Clear to auscultation bilaterally  HEART:  No murmurs, S1+s2   ABDOMEN: Soft, Nontender  EXTREMITIES: Warm and well perfused. No edema  NEUROLOGY: AAOx3    LABS:                        7.9    7.94  )-----------( 156      ( 01 Aug 2024 02:11 )             25.1     08-01    142  |  105  |  14  ----------------------------<  183<H>  4.0   |  24  |  0.93    Ca    9.0      01 Aug 2024 02:11  Phos  4.5     08-01  Mg     2.0     08-01    TPro  6.0  /  Alb  4.0  /  TBili  0.5  /  DBili  x   /  AST  30  /  ALT  18  /  AlkPhos  58  08-01      Radiographic Imaging, ECG, echocardiography personally reviewed.

## 2024-08-01 NOTE — PHYSICAL THERAPY INITIAL EVALUATION ADULT - ADDITIONAL COMMENTS
Pt was IND PTA, lives with wife in a house with 4 KATHIE and 1 FOS within. Pt denies use of any DME/AD PTA.

## 2024-08-01 NOTE — PROGRESS NOTE ADULT - SUBJECTIVE AND OBJECTIVE BOX
CTICU  CRITICAL  CARE  attending     Hand off received 					   Pertinent clinical, laboratory, radiographic, hemodynamic, echocardiographic, respiratory data, microbiologic data and chart were reviewed and analyzed frequently throughout the course of the day and night  Patient seen and examined with CTS/ SH attending at bedside  Pt is a 64y , Male, HEALTH ISSUES - PROBLEM Dx:      , FAMILY HISTORY:  PAST MEDICAL & SURGICAL HISTORY:  DM (diabetes mellitus)      Cardiac arrest      HTN (hypertension)      HLD (hyperlipidemia)      Status post insertion of percutaneous endoscopic gastrostomy (PEG) tube        Patient is a 64y old  Male who presents with a chief complaint of CAD    I25.10     (01 Aug 2024 11:40)      14 system review was unremarkable    Vital signs, hemodynamic and respiratory parameters were reviewed from the bedside nursing flowsheet.  ICU Vital Signs Last 24 Hrs  T(C): 36.7 (01 Aug 2024 22:47), Max: 37.2 (01 Aug 2024 17:17)  T(F): 98 (01 Aug 2024 22:47), Max: 99 (01 Aug 2024 17:17)  HR: 101 (01 Aug 2024 23:00) (78 - 105)  BP: 87/43 (01 Aug 2024 06:00) (87/43 - 87/43)  BP(mean): 61 (01 Aug 2024 06:00) (61 - 61)  ABP: 130/46 (01 Aug 2024 23:00) (81/50 - 146/45)  ABP(mean): 76 (01 Aug 2024 23:00) (50 - 100)  RR: 16 (01 Aug 2024 23:00) (16 - 18)  SpO2: 100% (01 Aug 2024 23:00) (96% - 100%)    O2 Parameters below as of 01 Aug 2024 23:00  Patient On (Oxygen Delivery Method): nasal cannula w/ humidification  O2 Flow (L/min): 2        Adult Advanced Hemodynamics Last 24 Hrs  CVP(mm Hg): 4 (01 Aug 2024 23:00) (-3 - 18)  CVP(cm H2O): --  CO: --  CI: --  PA: --  PA(mean): --  PCWP: --  SVR: --  SVRI: --  PVR: --  PVRI: --, ABG - ( 01 Aug 2024 21:12 )  pH, Arterial: 7.44  pH, Blood: x     /  pCO2: 37    /  pO2: 81    / HCO3: 25    / Base Excess: 1.1   /  SaO2: 98.1              Mode: HFNC  FiO2: 40    Intake and output was reviewed and the fluid balance was calculated  Daily     Daily   I&O's Summary    31 Jul 2024 07:01  -  01 Aug 2024 07:00  --------------------------------------------------------  IN: 1955.8 mL / OUT: 2640 mL / NET: -684.2 mL    01 Aug 2024 07:01  -  01 Aug 2024 23:08  --------------------------------------------------------  IN: 1628.6 mL / OUT: 1745 mL / NET: -116.4 mL        All lines and drain sites were assessed  [facility]      POCT Blood Glucose.: 235 mg/dL (01 Aug 2024 21:51)    No acute change in mental status  Auscultation of the chest reveals equal bs  Abdomen is soft  Extremities are warm and well perfused  Wounds appear clean and unremarkable  Antibiotics are periop    labs  CBC Full  -  ( 01 Aug 2024 21:33 )  WBC Count : 7.52 K/uL  RBC Count : 3.66 M/uL  Hemoglobin : 9.5 g/dL  Hematocrit : 28.9 %  Platelet Count - Automated : 103 K/uL  Mean Cell Volume : 79.0 fl  Mean Cell Hemoglobin : 26.0 pg  Mean Cell Hemoglobin Concentration : 32.9 gm/dL  Auto Neutrophil # : 6.40 K/uL  Auto Lymphocyte # : 0.52 K/uL  Auto Monocyte # : 0.55 K/uL  Auto Eosinophil # : 0.00 K/uL  Auto Basophil # : 0.01 K/uL  Auto Neutrophil % : 85.2 %  Auto Lymphocyte % : 6.9 %  Auto Monocyte % : 7.3 %  Auto Eosinophil % : 0.0 %  Auto Basophil % : 0.1 %    08-01    138  |  103  |  21  ----------------------------<  231<H>  4.5   |  25  |  0.92    Ca    8.7      01 Aug 2024 21:33  Phos  1.7     08-01  Mg     1.8     08-01    TPro  6.3  /  Alb  4.0  /  TBili  0.6  /  DBili  x   /  AST  30  /  ALT  15  /  AlkPhos  56  08-01    PT/INR - ( 01 Aug 2024 21:33 )   PT: 14.0 sec;   INR: 1.24          PTT - ( 01 Aug 2024 21:33 )  PTT:37.7 sec  The current medications were reviewed   MEDICATIONS  (STANDING):  acetaminophen     Tablet .. 1000 milliGRAM(s) Oral every 6 hours  ascorbic acid 500 milliGRAM(s) Oral two times a day  aspirin enteric coated 81 milliGRAM(s) Oral daily  atorvastatin 80 milliGRAM(s) Oral at bedtime  chlorhexidine 2% Cloths 1 Application(s) Topical daily  clopidogrel Tablet 75 milliGRAM(s) Oral daily  dextrose 5%. 1000 milliLiter(s) (100 mL/Hr) IV Continuous <Continuous>  dextrose 5%. 1000 milliLiter(s) (50 mL/Hr) IV Continuous <Continuous>  dextrose 50% Injectable 12.5 Gram(s) IV Push once  dextrose 50% Injectable 25 Gram(s) IV Push once  dextrose 50% Injectable 25 Gram(s) IV Push once  DOBUTamine Infusion 2 MICROgram(s)/kG/Min (4.19 mL/Hr) IV Continuous <Continuous>  furosemide   Injectable 20 milliGRAM(s) IV Push every 6 hours  gabapentin 100 milliGRAM(s) Oral every 8 hours  glucagon  Injectable 1 milliGRAM(s) IntraMuscular once  heparin   Injectable 5000 Unit(s) SubCutaneous every 8 hours  insulin glargine Injectable (LANTUS) 14 Unit(s) SubCutaneous at bedtime  insulin lispro (ADMELOG) corrective regimen sliding scale   SubCutaneous Before meals and at bedtime  insulin lispro Injectable (ADMELOG) 6 Unit(s) SubCutaneous three times a day before meals  lidocaine   4% Patch 1 Patch Transdermal daily  mupirocin 2% Ointment 1 Application(s) Both Nostrils two times a day  pantoprazole    Tablet 40 milliGRAM(s) Oral before breakfast  polyethylene glycol 3350 17 Gram(s) Oral daily  potassium chloride    Tablet ER 20 milliEquivalent(s) Oral daily  senna 2 Tablet(s) Oral at bedtime  sodium chloride 0.9%. 1000 milliLiter(s) (10 mL/Hr) IV Continuous <Continuous>  sodium phosphate 15 milliMole(s)/250 mL IVPB 15 milliMole(s) IV Intermittent once    MEDICATIONS  (PRN):  dextrose Oral Gel 15 Gram(s) Oral once PRN Blood Glucose LESS THAN 70 milliGRAM(s)/deciliter       PROBLEM LIST/ ASSESSMENT:  HEALTH ISSUES - PROBLEM Dx:      ,   Patient is a 64y old  Male who presents with a chief complaint of CAD    I25.10     (01 Aug 2024 11:40)     s/p cardiac surgery    Acute systolic and diastolic heart failure evidenced by SOB and parenchymal infiltrates; will treat with diuresis    Cardiogenic shock on ionotropy    Vasogenic shock due to hypotension in the cticu , will keep on pressors      Acute post operative pulmonary insufficiency ruled in due to hypoxemia, O2 sats < 91% on RA treated with HFNC          My plan includes :  close hemodynamic, ventilatory and drain monitoring and management per post op routine    Monitor for arrhythmias and monitor parameters for organ perfusion  beta blockade not administered due to hemodynamic instability and bradycardia  monitor neurologic status  Head of the bed should remain elevated to 45 deg .   chest PT and IS will be encouraged  monitor adequacy of oxygenation and ventilation and attempt to wean oxygen  antibiotic regimen will be tailored to the clinical, laboratory and microbiologic data  Nutritional goals will be met using po eventually , ensure adequate caloric intake and montior the same  Stress ulcer and VTE prophylaxis will be achieved    Glycemic control is satisfactory  Electrolytes have been repleted as necessary and wound care has been carried out. Pain control has been achieved.   agressive physical therapy and early mobility and ambulation goals will be met   The family was updated about the course and plan  CRITICAL CARE TIME Upon my evaluation, this patient had a high probability of imminent or life-threatening deterioration due to the above problems which required my direct attention, intervention, and personal management.  I have personally provided 150 minutes of critical care time exclusive of time spent on separately billable procedures. Time included review of laboratory data, radiology results, discussion with consultants, and monitoring for potential decompensation. Interventions were performed as documented abovepersonally provided by me  in evaluation and management, reassessments, review and interpretation of labs and x-rays, ventilator and hemodynamic management, formulating a plan and coordinating care: ___150___ MIN.  Time does not include procedural time.    CTICU ATTENDING     					    Lucas Dawkins MD

## 2024-08-01 NOTE — PHYSICAL THERAPY INITIAL EVALUATION ADULT - ACTIVE RANGE OF MOTION EXAMINATION, REHAB EVAL
marianna. upper extremity Active ROM was WNL (within normal limits)/bilateral lower extremity Active ROM was WNL (within normal limits)

## 2024-08-02 ENCOUNTER — RESULT REVIEW (OUTPATIENT)
Age: 64
End: 2024-08-02

## 2024-08-02 LAB
ALBUMIN SERPL ELPH-MCNC: 3.8 G/DL — SIGNIFICANT CHANGE UP (ref 3.3–5)
ALBUMIN SERPL ELPH-MCNC: 4.2 G/DL — SIGNIFICANT CHANGE UP (ref 3.3–5)
ALBUMIN SERPL ELPH-MCNC: 4.2 G/DL — SIGNIFICANT CHANGE UP (ref 3.3–5)
ALP SERPL-CCNC: 49 U/L — SIGNIFICANT CHANGE UP (ref 40–120)
ALP SERPL-CCNC: 52 U/L — SIGNIFICANT CHANGE UP (ref 40–120)
ALP SERPL-CCNC: 54 U/L — SIGNIFICANT CHANGE UP (ref 40–120)
ALT FLD-CCNC: 12 U/L — SIGNIFICANT CHANGE UP (ref 10–45)
ALT FLD-CCNC: 12 U/L — SIGNIFICANT CHANGE UP (ref 10–45)
ALT FLD-CCNC: 9 U/L — LOW (ref 10–45)
ANION GAP SERPL CALC-SCNC: 10 MMOL/L — SIGNIFICANT CHANGE UP (ref 5–17)
ANION GAP SERPL CALC-SCNC: 10 MMOL/L — SIGNIFICANT CHANGE UP (ref 5–17)
ANION GAP SERPL CALC-SCNC: 9 MMOL/L — SIGNIFICANT CHANGE UP (ref 5–17)
APTT BLD: 32.1 SEC — SIGNIFICANT CHANGE UP (ref 24.5–35.6)
APTT BLD: 36.6 SEC — HIGH (ref 24.5–35.6)
APTT BLD: 61.5 SEC — HIGH (ref 24.5–35.6)
AST SERPL-CCNC: 25 U/L — SIGNIFICANT CHANGE UP (ref 10–40)
AST SERPL-CCNC: 26 U/L — SIGNIFICANT CHANGE UP (ref 10–40)
AST SERPL-CCNC: 27 U/L — SIGNIFICANT CHANGE UP (ref 10–40)
BASOPHILS # BLD AUTO: 0.02 K/UL — SIGNIFICANT CHANGE UP (ref 0–0.2)
BASOPHILS NFR BLD AUTO: 0.2 % — SIGNIFICANT CHANGE UP (ref 0–2)
BASOPHILS NFR BLD AUTO: 0.3 % — SIGNIFICANT CHANGE UP (ref 0–2)
BASOPHILS NFR BLD AUTO: 0.3 % — SIGNIFICANT CHANGE UP (ref 0–2)
BILIRUB SERPL-MCNC: 0.5 MG/DL — SIGNIFICANT CHANGE UP (ref 0.2–1.2)
BILIRUB SERPL-MCNC: 0.5 MG/DL — SIGNIFICANT CHANGE UP (ref 0.2–1.2)
BILIRUB SERPL-MCNC: 0.7 MG/DL — SIGNIFICANT CHANGE UP (ref 0.2–1.2)
BUN SERPL-MCNC: 17 MG/DL — SIGNIFICANT CHANGE UP (ref 7–23)
BUN SERPL-MCNC: 18 MG/DL — SIGNIFICANT CHANGE UP (ref 7–23)
BUN SERPL-MCNC: 19 MG/DL — SIGNIFICANT CHANGE UP (ref 7–23)
CALCIUM SERPL-MCNC: 8.5 MG/DL — SIGNIFICANT CHANGE UP (ref 8.4–10.5)
CALCIUM SERPL-MCNC: 9 MG/DL — SIGNIFICANT CHANGE UP (ref 8.4–10.5)
CALCIUM SERPL-MCNC: 9.9 MG/DL — SIGNIFICANT CHANGE UP (ref 8.4–10.5)
CHLORIDE SERPL-SCNC: 100 MMOL/L — SIGNIFICANT CHANGE UP (ref 96–108)
CHLORIDE SERPL-SCNC: 103 MMOL/L — SIGNIFICANT CHANGE UP (ref 96–108)
CHLORIDE SERPL-SCNC: 103 MMOL/L — SIGNIFICANT CHANGE UP (ref 96–108)
CO2 SERPL-SCNC: 26 MMOL/L — SIGNIFICANT CHANGE UP (ref 22–31)
CO2 SERPL-SCNC: 27 MMOL/L — SIGNIFICANT CHANGE UP (ref 22–31)
CO2 SERPL-SCNC: 28 MMOL/L — SIGNIFICANT CHANGE UP (ref 22–31)
CREAT SERPL-MCNC: 0.75 MG/DL — SIGNIFICANT CHANGE UP (ref 0.5–1.3)
CREAT SERPL-MCNC: 0.79 MG/DL — SIGNIFICANT CHANGE UP (ref 0.5–1.3)
CREAT SERPL-MCNC: 0.89 MG/DL — SIGNIFICANT CHANGE UP (ref 0.5–1.3)
EGFR: 100 ML/MIN/1.73M2 — SIGNIFICANT CHANGE UP
EGFR: 102 ML/MIN/1.73M2 — SIGNIFICANT CHANGE UP
EGFR: 97 ML/MIN/1.73M2 — SIGNIFICANT CHANGE UP
EOSINOPHIL # BLD AUTO: 0 K/UL — SIGNIFICANT CHANGE UP (ref 0–0.5)
EOSINOPHIL # BLD AUTO: 0 K/UL — SIGNIFICANT CHANGE UP (ref 0–0.5)
EOSINOPHIL # BLD AUTO: 0.07 K/UL — SIGNIFICANT CHANGE UP (ref 0–0.5)
EOSINOPHIL NFR BLD AUTO: 0 % — SIGNIFICANT CHANGE UP (ref 0–6)
EOSINOPHIL NFR BLD AUTO: 0 % — SIGNIFICANT CHANGE UP (ref 0–6)
EOSINOPHIL NFR BLD AUTO: 0.8 % — SIGNIFICANT CHANGE UP (ref 0–6)
GAS PNL BLDA: SIGNIFICANT CHANGE UP
GLUCOSE BLDC GLUCOMTR-MCNC: 104 MG/DL — HIGH (ref 70–99)
GLUCOSE BLDC GLUCOMTR-MCNC: 115 MG/DL — HIGH (ref 70–99)
GLUCOSE BLDC GLUCOMTR-MCNC: 131 MG/DL — HIGH (ref 70–99)
GLUCOSE BLDC GLUCOMTR-MCNC: 145 MG/DL — HIGH (ref 70–99)
GLUCOSE BLDC GLUCOMTR-MCNC: 156 MG/DL — HIGH (ref 70–99)
GLUCOSE BLDC GLUCOMTR-MCNC: 191 MG/DL — HIGH (ref 70–99)
GLUCOSE BLDC GLUCOMTR-MCNC: 208 MG/DL — HIGH (ref 70–99)
GLUCOSE BLDC GLUCOMTR-MCNC: 227 MG/DL — HIGH (ref 70–99)
GLUCOSE BLDC GLUCOMTR-MCNC: 265 MG/DL — HIGH (ref 70–99)
GLUCOSE BLDC GLUCOMTR-MCNC: 285 MG/DL — HIGH (ref 70–99)
GLUCOSE BLDC GLUCOMTR-MCNC: 322 MG/DL — HIGH (ref 70–99)
GLUCOSE SERPL-MCNC: 210 MG/DL — HIGH (ref 70–99)
GLUCOSE SERPL-MCNC: 286 MG/DL — HIGH (ref 70–99)
GLUCOSE SERPL-MCNC: 99 MG/DL — SIGNIFICANT CHANGE UP (ref 70–99)
HCT VFR BLD CALC: 25.7 % — LOW (ref 39–50)
HCT VFR BLD CALC: 25.9 % — LOW (ref 39–50)
HCT VFR BLD CALC: 27.6 % — LOW (ref 39–50)
HGB BLD-MCNC: 8.4 G/DL — LOW (ref 13–17)
HGB BLD-MCNC: 8.5 G/DL — LOW (ref 13–17)
HGB BLD-MCNC: 9.2 G/DL — LOW (ref 13–17)
IMM GRANULOCYTES NFR BLD AUTO: 0.3 % — SIGNIFICANT CHANGE UP (ref 0–0.9)
IMM GRANULOCYTES NFR BLD AUTO: 0.4 % — SIGNIFICANT CHANGE UP (ref 0–0.9)
IMM GRANULOCYTES NFR BLD AUTO: 0.5 % — SIGNIFICANT CHANGE UP (ref 0–0.9)
INR BLD: 1 — SIGNIFICANT CHANGE UP (ref 0.85–1.18)
INR BLD: 1.09 — SIGNIFICANT CHANGE UP (ref 0.85–1.18)
INR BLD: 1.11 — SIGNIFICANT CHANGE UP (ref 0.85–1.18)
LACTATE SERPL-SCNC: 0.7 MMOL/L — SIGNIFICANT CHANGE UP (ref 0.5–2)
LACTATE SERPL-SCNC: 1.1 MMOL/L — SIGNIFICANT CHANGE UP (ref 0.5–2)
LACTATE SERPL-SCNC: 1.5 MMOL/L — SIGNIFICANT CHANGE UP (ref 0.5–2)
LYMPHOCYTES # BLD AUTO: 0.43 K/UL — LOW (ref 1–3.3)
LYMPHOCYTES # BLD AUTO: 0.92 K/UL — LOW (ref 1–3.3)
LYMPHOCYTES # BLD AUTO: 1.59 K/UL — SIGNIFICANT CHANGE UP (ref 1–3.3)
LYMPHOCYTES # BLD AUTO: 14.2 % — SIGNIFICANT CHANGE UP (ref 13–44)
LYMPHOCYTES # BLD AUTO: 19.2 % — SIGNIFICANT CHANGE UP (ref 13–44)
LYMPHOCYTES # BLD AUTO: 5.8 % — LOW (ref 13–44)
MAGNESIUM SERPL-MCNC: 1.9 MG/DL — SIGNIFICANT CHANGE UP (ref 1.6–2.6)
MAGNESIUM SERPL-MCNC: 2.3 MG/DL — SIGNIFICANT CHANGE UP (ref 1.6–2.6)
MAGNESIUM SERPL-MCNC: 2.8 MG/DL — HIGH (ref 1.6–2.6)
MCHC RBC-ENTMCNC: 25 PG — LOW (ref 27–34)
MCHC RBC-ENTMCNC: 26.1 PG — LOW (ref 27–34)
MCHC RBC-ENTMCNC: 26.2 PG — LOW (ref 27–34)
MCHC RBC-ENTMCNC: 32.4 GM/DL — SIGNIFICANT CHANGE UP (ref 32–36)
MCHC RBC-ENTMCNC: 33.1 GM/DL — SIGNIFICANT CHANGE UP (ref 32–36)
MCHC RBC-ENTMCNC: 33.3 GM/DL — SIGNIFICANT CHANGE UP (ref 32–36)
MCV RBC AUTO: 77.1 FL — LOW (ref 80–100)
MCV RBC AUTO: 78.6 FL — LOW (ref 80–100)
MCV RBC AUTO: 78.8 FL — LOW (ref 80–100)
MONOCYTES # BLD AUTO: 0.59 K/UL — SIGNIFICANT CHANGE UP (ref 0–0.9)
MONOCYTES # BLD AUTO: 0.74 K/UL — SIGNIFICANT CHANGE UP (ref 0–0.9)
MONOCYTES # BLD AUTO: 0.79 K/UL — SIGNIFICANT CHANGE UP (ref 0–0.9)
MONOCYTES NFR BLD AUTO: 11.4 % — SIGNIFICANT CHANGE UP (ref 2–14)
MONOCYTES NFR BLD AUTO: 7.9 % — SIGNIFICANT CHANGE UP (ref 2–14)
MONOCYTES NFR BLD AUTO: 9.5 % — SIGNIFICANT CHANGE UP (ref 2–14)
NEUTROPHILS # BLD AUTO: 4.77 K/UL — SIGNIFICANT CHANGE UP (ref 1.8–7.4)
NEUTROPHILS # BLD AUTO: 5.8 K/UL — SIGNIFICANT CHANGE UP (ref 1.8–7.4)
NEUTROPHILS # BLD AUTO: 6.38 K/UL — SIGNIFICANT CHANGE UP (ref 1.8–7.4)
NEUTROPHILS NFR BLD AUTO: 69.9 % — SIGNIFICANT CHANGE UP (ref 43–77)
NEUTROPHILS NFR BLD AUTO: 73.8 % — SIGNIFICANT CHANGE UP (ref 43–77)
NEUTROPHILS NFR BLD AUTO: 85.5 % — HIGH (ref 43–77)
NRBC # BLD: 0 /100 WBCS — SIGNIFICANT CHANGE UP (ref 0–0)
PHOSPHATE SERPL-MCNC: 1.2 MG/DL — LOW (ref 2.5–4.5)
PHOSPHATE SERPL-MCNC: 1.4 MG/DL — LOW (ref 2.5–4.5)
PHOSPHATE SERPL-MCNC: 3.3 MG/DL — SIGNIFICANT CHANGE UP (ref 2.5–4.5)
PLATELET # BLD AUTO: 101 K/UL — LOW (ref 150–400)
PLATELET # BLD AUTO: 107 K/UL — LOW (ref 150–400)
PLATELET # BLD AUTO: 125 K/UL — LOW (ref 150–400)
POTASSIUM SERPL-MCNC: 3.6 MMOL/L — SIGNIFICANT CHANGE UP (ref 3.5–5.3)
POTASSIUM SERPL-MCNC: 3.8 MMOL/L — SIGNIFICANT CHANGE UP (ref 3.5–5.3)
POTASSIUM SERPL-MCNC: 3.8 MMOL/L — SIGNIFICANT CHANGE UP (ref 3.5–5.3)
POTASSIUM SERPL-SCNC: 3.6 MMOL/L — SIGNIFICANT CHANGE UP (ref 3.5–5.3)
POTASSIUM SERPL-SCNC: 3.8 MMOL/L — SIGNIFICANT CHANGE UP (ref 3.5–5.3)
POTASSIUM SERPL-SCNC: 3.8 MMOL/L — SIGNIFICANT CHANGE UP (ref 3.5–5.3)
PROT SERPL-MCNC: 6.2 G/DL — SIGNIFICANT CHANGE UP (ref 6–8.3)
PROT SERPL-MCNC: 6.4 G/DL — SIGNIFICANT CHANGE UP (ref 6–8.3)
PROT SERPL-MCNC: 6.6 G/DL — SIGNIFICANT CHANGE UP (ref 6–8.3)
PROTHROM AB SERPL-ACNC: 11.4 SEC — SIGNIFICANT CHANGE UP (ref 9.5–13)
PROTHROM AB SERPL-ACNC: 12.4 SEC — SIGNIFICANT CHANGE UP (ref 9.5–13)
PROTHROM AB SERPL-ACNC: 12.6 SEC — SIGNIFICANT CHANGE UP (ref 9.5–13)
RBC # BLD: 3.26 M/UL — LOW (ref 4.2–5.8)
RBC # BLD: 3.36 M/UL — LOW (ref 4.2–5.8)
RBC # BLD: 3.51 M/UL — LOW (ref 4.2–5.8)
RBC # FLD: 17.1 % — HIGH (ref 10.3–14.5)
RBC # FLD: 17.5 % — HIGH (ref 10.3–14.5)
RBC # FLD: 17.6 % — HIGH (ref 10.3–14.5)
SODIUM SERPL-SCNC: 136 MMOL/L — SIGNIFICANT CHANGE UP (ref 135–145)
SODIUM SERPL-SCNC: 139 MMOL/L — SIGNIFICANT CHANGE UP (ref 135–145)
SODIUM SERPL-SCNC: 141 MMOL/L — SIGNIFICANT CHANGE UP (ref 135–145)
WBC # BLD: 6.47 K/UL — SIGNIFICANT CHANGE UP (ref 3.8–10.5)
WBC # BLD: 7.46 K/UL — SIGNIFICANT CHANGE UP (ref 3.8–10.5)
WBC # BLD: 8.3 K/UL — SIGNIFICANT CHANGE UP (ref 3.8–10.5)
WBC # FLD AUTO: 6.47 K/UL — SIGNIFICANT CHANGE UP (ref 3.8–10.5)
WBC # FLD AUTO: 7.46 K/UL — SIGNIFICANT CHANGE UP (ref 3.8–10.5)
WBC # FLD AUTO: 8.3 K/UL — SIGNIFICANT CHANGE UP (ref 3.8–10.5)

## 2024-08-02 PROCEDURE — 99292 CRITICAL CARE ADDL 30 MIN: CPT

## 2024-08-02 PROCEDURE — 99291 CRITICAL CARE FIRST HOUR: CPT | Mod: GC

## 2024-08-02 PROCEDURE — 99291 CRITICAL CARE FIRST HOUR: CPT

## 2024-08-02 PROCEDURE — 93306 TTE W/DOPPLER COMPLETE: CPT | Mod: 26

## 2024-08-02 PROCEDURE — 71045 X-RAY EXAM CHEST 1 VIEW: CPT | Mod: 26

## 2024-08-02 RX ORDER — BUPIVACAINE HCL/0.9 % NACL/PF 0.25 %
0.05 PLASTIC BAG, INJECTION (ML) EPIDURAL
Qty: 8 | Refills: 0 | Status: DISCONTINUED | OUTPATIENT
Start: 2024-08-02 | End: 2024-08-03

## 2024-08-02 RX ORDER — POTASSIUM CHLORIDE 1500 MG/1
40 TABLET, EXTENDED RELEASE ORAL ONCE
Refills: 0 | Status: COMPLETED | OUTPATIENT
Start: 2024-08-02 | End: 2024-08-02

## 2024-08-02 RX ORDER — MIDODRINE HYDROCHLORIDE 2.5 MG/1
10 TABLET ORAL EVERY 8 HOURS
Refills: 0 | Status: DISCONTINUED | OUTPATIENT
Start: 2024-08-02 | End: 2024-08-03

## 2024-08-02 RX ORDER — POTASSIUM CHLORIDE 1500 MG/1
20 TABLET, EXTENDED RELEASE ORAL ONCE
Refills: 0 | Status: COMPLETED | OUTPATIENT
Start: 2024-08-02 | End: 2024-08-02

## 2024-08-02 RX ORDER — MIDODRINE HYDROCHLORIDE 2.5 MG/1
10 TABLET ORAL ONCE
Refills: 0 | Status: COMPLETED | OUTPATIENT
Start: 2024-08-02 | End: 2024-08-02

## 2024-08-02 RX ORDER — ACETAMINOPHEN 500 MG
1000 TABLET ORAL ONCE
Refills: 0 | Status: DISCONTINUED | OUTPATIENT
Start: 2024-08-02 | End: 2024-08-02

## 2024-08-02 RX ORDER — HEPARIN SODIUM 1000 [USP'U]/ML
5000 INJECTION, SOLUTION INTRAVENOUS; SUBCUTANEOUS EVERY 12 HOURS
Refills: 0 | Status: DISCONTINUED | OUTPATIENT
Start: 2024-08-02 | End: 2024-08-05

## 2024-08-02 RX ORDER — SOD PHOS DI, MONO/K PHOS MONO 250 MG
1 TABLET ORAL ONCE
Refills: 0 | Status: COMPLETED | OUTPATIENT
Start: 2024-08-02 | End: 2024-08-02

## 2024-08-02 RX ORDER — POTASSIUM PHOSPHATE, MONOBASIC AND POTASSIUM PHOSPHATE, DIBASIC 224; 236 MG/ML; MG/ML
30 INJECTION, SOLUTION INTRAVENOUS ONCE
Refills: 0 | Status: COMPLETED | OUTPATIENT
Start: 2024-08-02 | End: 2024-08-02

## 2024-08-02 RX ORDER — MAGNESIUM SULFATE 500 MG/ML
2 VIAL (ML) INJECTION ONCE
Refills: 0 | Status: COMPLETED | OUTPATIENT
Start: 2024-08-02 | End: 2024-08-02

## 2024-08-02 RX ORDER — ALBUMIN HUMAN 25 %
250 INTRAVENOUS SOLUTION INTRAVENOUS
Refills: 0 | Status: COMPLETED | OUTPATIENT
Start: 2024-08-02 | End: 2024-08-02

## 2024-08-02 RX ORDER — HYDROMORPHONE HCL IN 0.9% NACL 0.2 MG/ML
0.2 PLASTIC BAG, INJECTION (ML) INTRAVENOUS
Refills: 0 | Status: DISCONTINUED | OUTPATIENT
Start: 2024-08-02 | End: 2024-08-02

## 2024-08-02 RX ADMIN — Medication 2 UNIT(S)/HR: at 17:01

## 2024-08-02 RX ADMIN — Medication 17 GRAM(S): at 11:15

## 2024-08-02 RX ADMIN — POTASSIUM CHLORIDE 40 MILLIEQUIVALENT(S): 1500 TABLET, EXTENDED RELEASE ORAL at 18:13

## 2024-08-02 RX ADMIN — POTASSIUM PHOSPHATE, MONOBASIC AND POTASSIUM PHOSPHATE, DIBASIC 83.33 MILLIMOLE(S): 224; 236 INJECTION, SOLUTION INTRAVENOUS at 17:14

## 2024-08-02 RX ADMIN — MUPIROCIN CALCIUM 1 APPLICATION(S): 20 CREAM TOPICAL at 17:13

## 2024-08-02 RX ADMIN — INSULIN GLARGINE-YFGN 14 UNIT(S): 100 INJECTION, SOLUTION SUBCUTANEOUS at 21:24

## 2024-08-02 RX ADMIN — Medication 81 MILLIGRAM(S): at 11:15

## 2024-08-02 RX ADMIN — MIDODRINE HYDROCHLORIDE 10 MILLIGRAM(S): 2.5 TABLET ORAL at 23:37

## 2024-08-02 RX ADMIN — Medication 1000 MILLIGRAM(S): at 05:01

## 2024-08-02 RX ADMIN — GABAPENTIN 100 MILLIGRAM(S): 400 CAPSULE ORAL at 13:06

## 2024-08-02 RX ADMIN — FUROSEMIDE 20 MILLIGRAM(S): 10 INJECTION, SOLUTION INTRAVENOUS at 05:02

## 2024-08-02 RX ADMIN — Medication 1000 MILLIGRAM(S): at 06:00

## 2024-08-02 RX ADMIN — CHLORHEXIDINE GLUCONATE 1 APPLICATION(S): 500 CLOTH TOPICAL at 11:36

## 2024-08-02 RX ADMIN — PANTOPRAZOLE SODIUM 40 MILLIGRAM(S): 20 TABLET, DELAYED RELEASE ORAL at 05:02

## 2024-08-02 RX ADMIN — ATORVASTATIN CALCIUM 80 MILLIGRAM(S): 40 TABLET, FILM COATED ORAL at 21:24

## 2024-08-02 RX ADMIN — MIDODRINE HYDROCHLORIDE 10 MILLIGRAM(S): 2.5 TABLET ORAL at 09:15

## 2024-08-02 RX ADMIN — Medication 125 MILLILITER(S): at 10:16

## 2024-08-02 RX ADMIN — Medication 2: at 21:24

## 2024-08-02 RX ADMIN — Medication 500 MILLIGRAM(S): at 17:02

## 2024-08-02 RX ADMIN — Medication 4: at 07:02

## 2024-08-02 RX ADMIN — Medication 50 MILLIGRAM(S): at 05:03

## 2024-08-02 RX ADMIN — Medication 500 MILLIGRAM(S): at 05:02

## 2024-08-02 RX ADMIN — Medication 6 UNIT(S): at 11:37

## 2024-08-02 RX ADMIN — POTASSIUM CHLORIDE 100 MILLIEQUIVALENT(S): 1500 TABLET, EXTENDED RELEASE ORAL at 03:54

## 2024-08-02 RX ADMIN — GABAPENTIN 100 MILLIGRAM(S): 400 CAPSULE ORAL at 05:04

## 2024-08-02 RX ADMIN — Medication 200 GRAM(S): at 16:28

## 2024-08-02 RX ADMIN — Medication 6 UNIT(S): at 07:02

## 2024-08-02 RX ADMIN — GABAPENTIN 100 MILLIGRAM(S): 400 CAPSULE ORAL at 21:24

## 2024-08-02 RX ADMIN — CLOPIDOGREL BISULFATE 75 MILLIGRAM(S): 75 TABLET, FILM COATED ORAL at 11:15

## 2024-08-02 RX ADMIN — Medication 1000 MILLIGRAM(S): at 11:15

## 2024-08-02 RX ADMIN — HEPARIN SODIUM 5000 UNIT(S): 1000 INJECTION, SOLUTION INTRAVENOUS; SUBCUTANEOUS at 17:02

## 2024-08-02 RX ADMIN — Medication 1000 MILLIGRAM(S): at 23:37

## 2024-08-02 RX ADMIN — HEPARIN SODIUM 5000 UNIT(S): 1000 INJECTION, SOLUTION INTRAVENOUS; SUBCUTANEOUS at 05:02

## 2024-08-02 RX ADMIN — Medication 1 PACKET(S): at 13:05

## 2024-08-02 RX ADMIN — Medication 25 GRAM(S): at 14:20

## 2024-08-02 RX ADMIN — MUPIROCIN CALCIUM 1 APPLICATION(S): 20 CREAM TOPICAL at 05:18

## 2024-08-02 RX ADMIN — Medication 1000 MILLIGRAM(S): at 17:01

## 2024-08-02 RX ADMIN — Medication 125 MILLILITER(S): at 11:14

## 2024-08-02 RX ADMIN — MIDODRINE HYDROCHLORIDE 10 MILLIGRAM(S): 2.5 TABLET ORAL at 17:02

## 2024-08-02 NOTE — PROGRESS NOTE ADULT - ASSESSMENT
64M with stage C ICM (5.4cm, LVEF 30-35%), moderate cpcPH, stroke, DM2, severe 3VD now s/p elective CABG (7/31) x3 (LIMA-LAD, RA-OM, SVG-PDA).      Review:  TTE 7/29/2024: LV 5.4 cm, LVEF 30-35%, LVOT VTI 16 cm, normal RV size/function, mild AI, PASP 60 mmHg, IVC small/collapsing  LHC 6/2024: severe 3v CAD with LM involvement  RHC 6/2024: RA 5, PA 59/22 (35), PCWP 17, Iris CO/CI 5.1/2.8, PVR 3.6  Cardiac MRI 6/2024: LVEF 26%, RVEF 54%, subtle subendocardial LGE     #Post-cardiac surgery shock  off pressors and inotropes this AM  On midodrine     #Chronic systolic HF - ischemic w/ viability on MRI  -GDMT: On hold, on midodrine. Aim to wean off midodrine to start GDMT  -Diuretics: CVP 3 this AM. Stop  diuretics   -Device: premature    #cpcPH: Likely underlying lung disease in addition to heart failure

## 2024-08-02 NOTE — PROGRESS NOTE ADULT - SUBJECTIVE AND OBJECTIVE BOX
INTERVAL EVENTS: No o/n events. Walking around today    PHYSICAL EXAM:  GENERAL: No acute distress  CHEST/LUNG: Breathing comfortably   EXTREMITIES: Walking comfortably   NEUROLOGY: Alert     LABS:                        9.2    6.47  )-----------( 107      ( 02 Aug 2024 02:41 )             27.6     08-02    141  |  103  |  18  ----------------------------<  210<H>  3.8   |  28  |  0.89    Ca    9.0      02 Aug 2024 02:41  Phos  3.3     08-02  Mg     2.3     08-02    TPro  6.2  /  Alb  3.8  /  TBili  0.5  /  DBili  x   /  AST  26  /  ALT  12  /  AlkPhos  52  08-02      Radiographic Imaging, ECG, echocardiography personally reviewed.

## 2024-08-02 NOTE — PROGRESS NOTE ADULT - ATTENDING COMMENTS
63 YO M with a history of ACC/AHA Stage C ICM (LV 5.4 cm, LVEF 30-35%), moderate cpcPH, prior CVA, well controlled DM2, and known severe 3v CAD who presents for elective CABG. He was transferred from [facility] 6/2024 for surgical evaluation where plan was to pursue as outpatient and since then has tolerated GDMT with improvement in BNP levels and NYHA functional class. He is now s/p 3v CABG by Dr. Ardon on 7/31. He is recovering well though BP remains too low for resumption of GDMT at this time.    REVIEW OF STUDIES  TTE 7/29/2024: LV 5.4 cm, LVEF 30-35%, LVOT VTI 16 cm, normal RV size/function, mild AI, PASP 60 mmHg, IVC small/collapsing  [facility] 6/2024: severe 3v CAD with LM involvement  [facility] 6/2024: RA 5, PA 59/22 (35), PCWP 17, Iris CO/CI 5.1/2.8, PVR 3.6  Cardiac MRI 6/2024: LVEF 26%, RVEF 54%, subtle subendocardial LGE     PLAN  # Post-op shock  - low CVP and high central venous saturation, agree with holding inotropes   - wean midodrine to 5 mg TID after decreasing diuretics as below     # Chronic systolic heart failure  - Etiology: ischemic, now s/p revascularization  - GDMT: will reintroduce as tolerates, will need to wean midodrine off first   - Diuretics: euvolemic off diuretics, stop standing diuretics   - Device: premature     # CAD  - now s/p CABG  - on DAPT per VTS  - continue statin .

## 2024-08-02 NOTE — PROGRESS NOTE ADULT - SUBJECTIVE AND OBJECTIVE BOX
CTICU  CRITICAL  CARE  attending     Hand off received 					   Pertinent clinical, laboratory, radiographic, hemodynamic, echocardiographic, respiratory data, microbiologic data and chart were reviewed and analyzed frequently throughout the course of the day  Patient seen and examined with CTS/ SH attending at bedside  Pt is a 64yr old male with PMH HTN, HLD, HFrEF (25%, 2024), L retinal detachment sp repair cb residual L eye blindness, recent admission in January for respiratory arrest sp trach and PEG placement  with reversal, course cb by R PCA infarct; admission 2024 for SOB and found to have new onset heart failure with cardiac cath revealing multivessel CAD prompting tx to [facility], was dc with plan for return for operative plan. Pt underwent OpCABG x 3 (LIMA-LAD, RA-OM, SVG-PDA, EF 30%) with Dr. Ardon 24. Arrived intubated on levophed.  added. Extubated. :  decreased to 2.5. Midodrine started. Given 2 pRBCs. 8:  off this AM and back on levophed.     FAMILY HISTORY:  PAST MEDICAL & SURGICAL HISTORY:  DM (diabetes mellitus)  Cardiac arrest  HTN (hypertension)  HLD (hyperlipidemia)  Status post insertion of percutaneous endoscopic gastrostomy (PEG) tube        Patient is a 64y old  Male who presents with a chief complaint of CAD.      14 system review was unremarkable    Vital signs, hemodynamic and respiratory parameters were reviewed from the bedside nursing flowsheet.  ICU Vital Signs Last 24 Hrs  T(C): 36.4 (02 Aug 2024 05:05), Max: 37.2 (01 Aug 2024 17:17)  T(F): 97.5 (02 Aug 2024 05:05), Max: 99 (01 Aug 2024 17:17)  HR: 82 (02 Aug 2024 08:00) (78 - 105)  BP: --  BP(mean): --  ABP: 119/45 (02 Aug 2024 08:00) (81/50 - 139/45)  ABP(mean): 71 (02 Aug 2024 08:00) (59 - 100)  RR: 16 (02 Aug 2024 08:00) (16 - 18)  SpO2: 98% (02 Aug 2024 08:00) (96% - 100%)    O2 Parameters below as of 02 Aug 2024 08:00  Patient On (Oxygen Delivery Method): room air          Adult Advanced Hemodynamics Last 24 Hrs  CVP(mm Hg): -2 (02 Aug 2024 08:00) (-3 - 216)  CVP(cm H2O): --  CO: --  CI: --  PA: --  PA(mean): --  PCWP: --  SVR: --  SVRI: --  PVR: --  PVRI: --, ABG - ( 02 Aug 2024 03:01 )  pH, Arterial: 7.41  pH, Blood: x     /  pCO2: 44    /  pO2: 156   / HCO3: 28    / Base Excess: 2.7   /  SaO2: 98.9              Mode: HFNC  FiO2: 40    Intake and output was reviewed and the fluid balance was calculated  Daily     Daily   I&O's Summary    01 Aug 2024 07:01  -  02 Aug 2024 07:00  --------------------------------------------------------  IN: 2088 mL / OUT: 3595 mL / NET: -1507 mL    02 Aug 2024 07:01  -  02 Aug 2024 08:34  --------------------------------------------------------  IN: 13.9 mL / OUT: 0 mL / NET: 13.9 mL        All lines and drain sites were assessed  Glycemic trend was reviewedCAPILLARY BLOOD GLUCOSE      POCT Blood Glucose.: 208 mg/dL (02 Aug 2024 06:59)      Neuro: nad  HEENT: mmm  Heart: s1 s2  Lungs: clear bl  Abdomen: soft nt nd, prior peg site   Extremities: wwp    Lines:  RIJ TLC   R radial arterial line     Tubes:  Med x 2    labs  CBC Full  -  ( 02 Aug 2024 02:41 )  WBC Count : 6.47 K/uL  RBC Count : 3.51 M/uL  Hemoglobin : 9.2 g/dL  Hematocrit : 27.6 %  Platelet Count - Automated : 107 K/uL  Mean Cell Volume : 78.6 fl  Mean Cell Hemoglobin : 26.2 pg  Mean Cell Hemoglobin Concentration : 33.3 gm/dL  Auto Neutrophil # : 4.77 K/uL  Auto Lymphocyte # : 0.92 K/uL  Auto Monocyte # : 0.74 K/uL  Auto Eosinophil # : 0.00 K/uL  Auto Basophil # : 0.02 K/uL  Auto Neutrophil % : 73.8 %  Auto Lymphocyte % : 14.2 %  Auto Monocyte % : 11.4 %  Auto Eosinophil % : 0.0 %  Auto Basophil % : 0.3 %        141  |  103  |  18  ----------------------------<  210<H>  3.8   |  28  |  0.89    Ca    9.0      02 Aug 2024 02:41  Phos  3.3     08  Mg     2.3         TPro  6.2  /  Alb  3.8  /  TBili  0.5  /  DBili  x   /  AST  26  /  ALT  12  /  AlkPhos  52  0802    PT/INR - ( 02 Aug 2024 02:41 )   PT: 12.6 sec;   INR: 1.11          PTT - ( 02 Aug 2024 02:41 )  PTT:61.5 sec  The current medications were reviewed   MEDICATIONS  (STANDING):  acetaminophen     Tablet .. 1000 milliGRAM(s) Oral every 6 hours  ascorbic acid 500 milliGRAM(s) Oral two times a day  aspirin enteric coated 81 milliGRAM(s) Oral daily  atorvastatin 80 milliGRAM(s) Oral at bedtime  bisacodyl Suppository 10 milliGRAM(s) Rectal once  chlorhexidine 2% Cloths 1 Application(s) Topical daily  clopidogrel Tablet 75 milliGRAM(s) Oral daily  dextrose 5%. 1000 milliLiter(s) (100 mL/Hr) IV Continuous <Continuous>  dextrose 5%. 1000 milliLiter(s) (50 mL/Hr) IV Continuous <Continuous>  dextrose 50% Injectable 12.5 Gram(s) IV Push once  dextrose 50% Injectable 25 Gram(s) IV Push once  dextrose 50% Injectable 25 Gram(s) IV Push once  furosemide   Injectable 20 milliGRAM(s) IV Push every 6 hours  gabapentin 100 milliGRAM(s) Oral every 8 hours  glucagon  Injectable 1 milliGRAM(s) IntraMuscular once  heparin   Injectable 5000 Unit(s) SubCutaneous every 12 hours  hydrocortisone sodium succinate Injectable 50 milliGRAM(s) IV Push every 12 hours  insulin glargine Injectable (LANTUS) 14 Unit(s) SubCutaneous at bedtime  insulin lispro (ADMELOG) corrective regimen sliding scale   SubCutaneous Before meals and at bedtime  insulin lispro Injectable (ADMELOG) 6 Unit(s) SubCutaneous three times a day before meals  lidocaine   4% Patch 1 Patch Transdermal daily  midodrine 10 milliGRAM(s) Oral every 8 hours  mupirocin 2% Ointment 1 Application(s) Both Nostrils two times a day  norepinephrine Infusion 0.05 MICROgram(s)/kG/Min (6.55 mL/Hr) IV Continuous <Continuous>  pantoprazole    Tablet 40 milliGRAM(s) Oral before breakfast  polyethylene glycol 3350 17 Gram(s) Oral daily  potassium chloride    Tablet ER 20 milliEquivalent(s) Oral daily  senna 2 Tablet(s) Oral at bedtime  sodium chloride 0.9%. 1000 milliLiter(s) (10 mL/Hr) IV Continuous <Continuous>    MEDICATIONS  (PRN):  dextrose Oral Gel 15 Gram(s) Oral once PRN Blood Glucose LESS THAN 70 milliGRAM(s)/deciliter      Assessment/Plan:  64yr old male with PMH HTN, HLD, HFrEF (25%, 2024), L retinal detachment sp repair cb residual L eye blindness, recent admission in January for respiratory arrest sp trach and PEG placement  with reversal, course cb by R PCA infarct; admission 2024 for SOB and found to have new onset heart failure with cardiac cath revealing multivessel CAD prompting tx to [facility], was dc with plan for return for operative plan.     POD2 OpCABG x 3 (LIMA-LAD, RA-OM, SVG-PDA, EF 30%), [place], 24)   Vasogenic shock on levophed-titrate to MAP 65  Acute post operative anemia due to acute blood loss-trend H/H  Thrombocytopenia-monitor  Aspirin  Plavix  Dc steroids  Midodrine on  Diet as tolerated  Replete lytes prn  Monitor CT output  GI/DVT PPX  Bowel Regimen  Pain control  OOB with PT  Close hemodynamic, ventilatory and drain monitoring and management per post op routine  Monitor for arrhythmias and monitor parameters for organ perfusion  Beta blockade not administered due to hemodynamic instability and bradycardia  Monitor neurologic status  Head of the bed should remain elevated to 45 deg   Chest PT and IS will be encouraged  Monitor adequacy of oxygenation and ventilation and attempt to wean oxygen  Antibiotic regimen will be tailored to the clinical, laboratory and microbiologic data  Nutritional goals will be met using po eventually, ensure adequate caloric intake and monitor the same  Stress ulcer and VTE prophylaxis will be achieved    Glycemic control is satisfactory  Electrolytes have been repleted as necessary and wound care has been carried out   Pain control has been achieved.   Aggressive physical therapy and early mobility and ambulation goals will be met   The family was updated about the course and plan.    CRITICAL CARE TIME personally provided by me  in evaluation and management, reassessments, review and interpretation of labs and x-rays, ventilator and hemodynamic management, formulating a plan and coordinating care: ___140____ MIN.  Time does not include procedural time.    CTICU ATTENDING     					  Catrachita Kurtz MD

## 2024-08-03 LAB
ALBUMIN SERPL ELPH-MCNC: 3.7 G/DL — SIGNIFICANT CHANGE UP (ref 3.3–5)
ALBUMIN SERPL ELPH-MCNC: 3.7 G/DL — SIGNIFICANT CHANGE UP (ref 3.3–5)
ALP SERPL-CCNC: 45 U/L — SIGNIFICANT CHANGE UP (ref 40–120)
ALP SERPL-CCNC: 50 U/L — SIGNIFICANT CHANGE UP (ref 40–120)
ALT FLD-CCNC: 9 U/L — LOW (ref 10–45)
ALT FLD-CCNC: 9 U/L — LOW (ref 10–45)
ANION GAP SERPL CALC-SCNC: 7 MMOL/L — SIGNIFICANT CHANGE UP (ref 5–17)
ANION GAP SERPL CALC-SCNC: 9 MMOL/L — SIGNIFICANT CHANGE UP (ref 5–17)
ANISOCYTOSIS BLD QL: SLIGHT — SIGNIFICANT CHANGE UP
APTT BLD: 28.6 SEC — SIGNIFICANT CHANGE UP (ref 24.5–35.6)
APTT BLD: 36.7 SEC — HIGH (ref 24.5–35.6)
AST SERPL-CCNC: 20 U/L — SIGNIFICANT CHANGE UP (ref 10–40)
AST SERPL-CCNC: 21 U/L — SIGNIFICANT CHANGE UP (ref 10–40)
BASOPHILS # BLD AUTO: 0.03 K/UL — SIGNIFICANT CHANGE UP (ref 0–0.2)
BASOPHILS # BLD AUTO: 0.04 K/UL — SIGNIFICANT CHANGE UP (ref 0–0.2)
BASOPHILS NFR BLD AUTO: 0.4 % — SIGNIFICANT CHANGE UP (ref 0–2)
BASOPHILS NFR BLD AUTO: 0.6 % — SIGNIFICANT CHANGE UP (ref 0–2)
BILIRUB SERPL-MCNC: 0.6 MG/DL — SIGNIFICANT CHANGE UP (ref 0.2–1.2)
BILIRUB SERPL-MCNC: 0.7 MG/DL — SIGNIFICANT CHANGE UP (ref 0.2–1.2)
BLD GP AB SCN SERPL QL: NEGATIVE — SIGNIFICANT CHANGE UP
BUN SERPL-MCNC: 17 MG/DL — SIGNIFICANT CHANGE UP (ref 7–23)
BUN SERPL-MCNC: 18 MG/DL — SIGNIFICANT CHANGE UP (ref 7–23)
CALCIUM SERPL-MCNC: 8.3 MG/DL — LOW (ref 8.4–10.5)
CALCIUM SERPL-MCNC: 8.6 MG/DL — SIGNIFICANT CHANGE UP (ref 8.4–10.5)
CHLORIDE SERPL-SCNC: 104 MMOL/L — SIGNIFICANT CHANGE UP (ref 96–108)
CHLORIDE SERPL-SCNC: 107 MMOL/L — SIGNIFICANT CHANGE UP (ref 96–108)
CO2 SERPL-SCNC: 24 MMOL/L — SIGNIFICANT CHANGE UP (ref 22–31)
CO2 SERPL-SCNC: 28 MMOL/L — SIGNIFICANT CHANGE UP (ref 22–31)
CREAT SERPL-MCNC: 0.76 MG/DL — SIGNIFICANT CHANGE UP (ref 0.5–1.3)
CREAT SERPL-MCNC: 0.83 MG/DL — SIGNIFICANT CHANGE UP (ref 0.5–1.3)
EGFR: 102 ML/MIN/1.73M2 — SIGNIFICANT CHANGE UP
EGFR: 99 ML/MIN/1.73M2 — SIGNIFICANT CHANGE UP
ELLIPTOCYTES BLD QL SMEAR: SLIGHT — SIGNIFICANT CHANGE UP
EOSINOPHIL # BLD AUTO: 0.24 K/UL — SIGNIFICANT CHANGE UP (ref 0–0.5)
EOSINOPHIL # BLD AUTO: 0.33 K/UL — SIGNIFICANT CHANGE UP (ref 0–0.5)
EOSINOPHIL NFR BLD AUTO: 3.4 % — SIGNIFICANT CHANGE UP (ref 0–6)
EOSINOPHIL NFR BLD AUTO: 4.9 % — SIGNIFICANT CHANGE UP (ref 0–6)
GAS PNL BLDA: SIGNIFICANT CHANGE UP
GLUCOSE BLDC GLUCOMTR-MCNC: 145 MG/DL — HIGH (ref 70–99)
GLUCOSE BLDC GLUCOMTR-MCNC: 159 MG/DL — HIGH (ref 70–99)
GLUCOSE BLDC GLUCOMTR-MCNC: 181 MG/DL — HIGH (ref 70–99)
GLUCOSE BLDC GLUCOMTR-MCNC: 222 MG/DL — HIGH (ref 70–99)
GLUCOSE SERPL-MCNC: 136 MG/DL — HIGH (ref 70–99)
GLUCOSE SERPL-MCNC: 177 MG/DL — HIGH (ref 70–99)
HCT VFR BLD CALC: 23.4 % — LOW (ref 39–50)
HCT VFR BLD CALC: 26.8 % — LOW (ref 39–50)
HGB BLD-MCNC: 7.9 G/DL — LOW (ref 13–17)
HGB BLD-MCNC: 8.9 G/DL — LOW (ref 13–17)
HYPOCHROMIA BLD QL: SLIGHT — SIGNIFICANT CHANGE UP
IMM GRANULOCYTES NFR BLD AUTO: 0.1 % — SIGNIFICANT CHANGE UP (ref 0–0.9)
IMM GRANULOCYTES NFR BLD AUTO: 0.1 % — SIGNIFICANT CHANGE UP (ref 0–0.9)
INR BLD: 0.98 — SIGNIFICANT CHANGE UP (ref 0.85–1.18)
INR BLD: 1.02 — SIGNIFICANT CHANGE UP (ref 0.85–1.18)
LACTATE SERPL-SCNC: 0.8 MMOL/L — SIGNIFICANT CHANGE UP (ref 0.5–2)
LYMPHOCYTES # BLD AUTO: 1.29 K/UL — SIGNIFICANT CHANGE UP (ref 1–3.3)
LYMPHOCYTES # BLD AUTO: 1.66 K/UL — SIGNIFICANT CHANGE UP (ref 1–3.3)
LYMPHOCYTES # BLD AUTO: 19.1 % — SIGNIFICANT CHANGE UP (ref 13–44)
LYMPHOCYTES # BLD AUTO: 23.5 % — SIGNIFICANT CHANGE UP (ref 13–44)
MACROCYTES BLD QL: SLIGHT — SIGNIFICANT CHANGE UP
MAGNESIUM SERPL-MCNC: 1.8 MG/DL — SIGNIFICANT CHANGE UP (ref 1.6–2.6)
MAGNESIUM SERPL-MCNC: 2 MG/DL — SIGNIFICANT CHANGE UP (ref 1.6–2.6)
MANUAL SMEAR VERIFICATION: SIGNIFICANT CHANGE UP
MCHC RBC-ENTMCNC: 26.2 PG — LOW (ref 27–34)
MCHC RBC-ENTMCNC: 26.3 PG — LOW (ref 27–34)
MCHC RBC-ENTMCNC: 33.2 GM/DL — SIGNIFICANT CHANGE UP (ref 32–36)
MCHC RBC-ENTMCNC: 33.8 GM/DL — SIGNIFICANT CHANGE UP (ref 32–36)
MCV RBC AUTO: 78 FL — LOW (ref 80–100)
MCV RBC AUTO: 78.8 FL — LOW (ref 80–100)
MICROCYTES BLD QL: SLIGHT — SIGNIFICANT CHANGE UP
MONOCYTES # BLD AUTO: 0.63 K/UL — SIGNIFICANT CHANGE UP (ref 0–0.9)
MONOCYTES # BLD AUTO: 0.67 K/UL — SIGNIFICANT CHANGE UP (ref 0–0.9)
MONOCYTES NFR BLD AUTO: 9.3 % — SIGNIFICANT CHANGE UP (ref 2–14)
MONOCYTES NFR BLD AUTO: 9.5 % — SIGNIFICANT CHANGE UP (ref 2–14)
NEUTROPHILS # BLD AUTO: 4.45 K/UL — SIGNIFICANT CHANGE UP (ref 1.8–7.4)
NEUTROPHILS # BLD AUTO: 4.46 K/UL — SIGNIFICANT CHANGE UP (ref 1.8–7.4)
NEUTROPHILS NFR BLD AUTO: 63.1 % — SIGNIFICANT CHANGE UP (ref 43–77)
NEUTROPHILS NFR BLD AUTO: 66 % — SIGNIFICANT CHANGE UP (ref 43–77)
NRBC # BLD: 0 /100 WBCS — SIGNIFICANT CHANGE UP (ref 0–0)
NRBC # BLD: 0 /100 WBCS — SIGNIFICANT CHANGE UP (ref 0–0)
OVALOCYTES BLD QL SMEAR: SLIGHT — SIGNIFICANT CHANGE UP
PHOSPHATE SERPL-MCNC: 1.8 MG/DL — LOW (ref 2.5–4.5)
PHOSPHATE SERPL-MCNC: 2.1 MG/DL — LOW (ref 2.5–4.5)
PLAT MORPH BLD: ABNORMAL
PLATELET # BLD AUTO: 115 K/UL — LOW (ref 150–400)
PLATELET # BLD AUTO: 118 K/UL — LOW (ref 150–400)
POIKILOCYTOSIS BLD QL AUTO: SLIGHT — SIGNIFICANT CHANGE UP
POLYCHROMASIA BLD QL SMEAR: SLIGHT — SIGNIFICANT CHANGE UP
POTASSIUM SERPL-MCNC: 4.1 MMOL/L — SIGNIFICANT CHANGE UP (ref 3.5–5.3)
POTASSIUM SERPL-MCNC: 4.2 MMOL/L — SIGNIFICANT CHANGE UP (ref 3.5–5.3)
POTASSIUM SERPL-SCNC: 4.1 MMOL/L — SIGNIFICANT CHANGE UP (ref 3.5–5.3)
POTASSIUM SERPL-SCNC: 4.2 MMOL/L — SIGNIFICANT CHANGE UP (ref 3.5–5.3)
PROT SERPL-MCNC: 5.8 G/DL — LOW (ref 6–8.3)
PROT SERPL-MCNC: 5.8 G/DL — LOW (ref 6–8.3)
PROTHROM AB SERPL-ACNC: 11.2 SEC — SIGNIFICANT CHANGE UP (ref 9.5–13)
PROTHROM AB SERPL-ACNC: 11.6 SEC — SIGNIFICANT CHANGE UP (ref 9.5–13)
RBC # BLD: 3 M/UL — LOW (ref 4.2–5.8)
RBC # BLD: 3.4 M/UL — LOW (ref 4.2–5.8)
RBC # FLD: 17.8 % — HIGH (ref 10.3–14.5)
RBC # FLD: 18.2 % — HIGH (ref 10.3–14.5)
RBC BLD AUTO: ABNORMAL
RH IG SCN BLD-IMP: POSITIVE — SIGNIFICANT CHANGE UP
SCHISTOCYTES BLD QL AUTO: SLIGHT — SIGNIFICANT CHANGE UP
SODIUM SERPL-SCNC: 139 MMOL/L — SIGNIFICANT CHANGE UP (ref 135–145)
SODIUM SERPL-SCNC: 140 MMOL/L — SIGNIFICANT CHANGE UP (ref 135–145)
SPHEROCYTES BLD QL SMEAR: SLIGHT — SIGNIFICANT CHANGE UP
WBC # BLD: 6.75 K/UL — SIGNIFICANT CHANGE UP (ref 3.8–10.5)
WBC # BLD: 7.07 K/UL — SIGNIFICANT CHANGE UP (ref 3.8–10.5)
WBC # FLD AUTO: 6.75 K/UL — SIGNIFICANT CHANGE UP (ref 3.8–10.5)
WBC # FLD AUTO: 7.07 K/UL — SIGNIFICANT CHANGE UP (ref 3.8–10.5)

## 2024-08-03 PROCEDURE — 99291 CRITICAL CARE FIRST HOUR: CPT

## 2024-08-03 PROCEDURE — 71045 X-RAY EXAM CHEST 1 VIEW: CPT | Mod: 26,76

## 2024-08-03 RX ORDER — MIDODRINE HYDROCHLORIDE 2.5 MG/1
5 TABLET ORAL EVERY 8 HOURS
Refills: 0 | Status: DISCONTINUED | OUTPATIENT
Start: 2024-08-03 | End: 2024-08-04

## 2024-08-03 RX ORDER — FUROSEMIDE 10 MG/ML
40 INJECTION, SOLUTION INTRAVENOUS DAILY
Refills: 0 | Status: DISCONTINUED | OUTPATIENT
Start: 2024-08-04 | End: 2024-08-05

## 2024-08-03 RX ORDER — HYDROCORTISONE ACETATE
75 POWDER (GRAM) MISCELLANEOUS EVERY 8 HOURS
Refills: 0 | Status: DISCONTINUED | OUTPATIENT
Start: 2024-08-03 | End: 2024-08-03

## 2024-08-03 RX ORDER — MAGNESIUM OXIDE 253 MG/1
800 TABLET ORAL ONCE
Refills: 0 | Status: COMPLETED | OUTPATIENT
Start: 2024-08-03 | End: 2024-08-03

## 2024-08-03 RX ORDER — SOD PHOS DI, MONO/K PHOS MONO 250 MG
1 TABLET ORAL
Refills: 0 | Status: COMPLETED | OUTPATIENT
Start: 2024-08-03 | End: 2024-08-05

## 2024-08-03 RX ORDER — FUROSEMIDE 10 MG/ML
20 INJECTION, SOLUTION INTRAVENOUS ONCE
Refills: 0 | Status: COMPLETED | OUTPATIENT
Start: 2024-08-03 | End: 2024-08-03

## 2024-08-03 RX ORDER — FLUDROCORTISONE ACETATE 0.1 MG/1
0.1 TABLET ORAL DAILY
Refills: 0 | Status: DISCONTINUED | OUTPATIENT
Start: 2024-08-03 | End: 2024-08-05

## 2024-08-03 RX ORDER — POTASSIUM CHLORIDE 1500 MG/1
20 TABLET, EXTENDED RELEASE ORAL DAILY
Refills: 0 | Status: DISCONTINUED | OUTPATIENT
Start: 2024-08-03 | End: 2024-08-05

## 2024-08-03 RX ADMIN — Medication 10 MILLILITER(S): at 15:20

## 2024-08-03 RX ADMIN — Medication 1000 MILLIGRAM(S): at 01:08

## 2024-08-03 RX ADMIN — PANTOPRAZOLE SODIUM 40 MILLIGRAM(S): 20 TABLET, DELAYED RELEASE ORAL at 05:23

## 2024-08-03 RX ADMIN — Medication 1 PACKET(S): at 17:03

## 2024-08-03 RX ADMIN — CLOPIDOGREL BISULFATE 75 MILLIGRAM(S): 75 TABLET, FILM COATED ORAL at 11:26

## 2024-08-03 RX ADMIN — INSULIN GLARGINE-YFGN 14 UNIT(S): 100 INJECTION, SOLUTION SUBCUTANEOUS at 22:01

## 2024-08-03 RX ADMIN — Medication 1000 MILLIGRAM(S): at 11:26

## 2024-08-03 RX ADMIN — Medication 1000 MILLIGRAM(S): at 11:33

## 2024-08-03 RX ADMIN — ATORVASTATIN CALCIUM 80 MILLIGRAM(S): 40 TABLET, FILM COATED ORAL at 22:00

## 2024-08-03 RX ADMIN — Medication 2: at 22:01

## 2024-08-03 RX ADMIN — Medication 1000 MILLIGRAM(S): at 17:08

## 2024-08-03 RX ADMIN — Medication 1000 MILLIGRAM(S): at 17:03

## 2024-08-03 RX ADMIN — GABAPENTIN 100 MILLIGRAM(S): 400 CAPSULE ORAL at 14:04

## 2024-08-03 RX ADMIN — FLUDROCORTISONE ACETATE 0.1 MILLIGRAM(S): 0.1 TABLET ORAL at 11:25

## 2024-08-03 RX ADMIN — GABAPENTIN 100 MILLIGRAM(S): 400 CAPSULE ORAL at 05:23

## 2024-08-03 RX ADMIN — Medication 6 UNIT(S): at 17:04

## 2024-08-03 RX ADMIN — Medication 500 MILLIGRAM(S): at 05:23

## 2024-08-03 RX ADMIN — Medication 1000 MILLIGRAM(S): at 06:15

## 2024-08-03 RX ADMIN — Medication 81 MILLIGRAM(S): at 11:26

## 2024-08-03 RX ADMIN — Medication 4: at 11:25

## 2024-08-03 RX ADMIN — MIDODRINE HYDROCHLORIDE 5 MILLIGRAM(S): 2.5 TABLET ORAL at 23:13

## 2024-08-03 RX ADMIN — Medication 1000 MILLIGRAM(S): at 23:00

## 2024-08-03 RX ADMIN — Medication 2: at 17:03

## 2024-08-03 RX ADMIN — POTASSIUM CHLORIDE 20 MILLIEQUIVALENT(S): 1500 TABLET, EXTENDED RELEASE ORAL at 15:36

## 2024-08-03 RX ADMIN — Medication 1000 MILLIGRAM(S): at 05:23

## 2024-08-03 RX ADMIN — MAGNESIUM OXIDE 800 MILLIGRAM(S): 253 TABLET ORAL at 15:36

## 2024-08-03 RX ADMIN — Medication 6 UNIT(S): at 11:26

## 2024-08-03 RX ADMIN — Medication 500 MILLIGRAM(S): at 17:03

## 2024-08-03 RX ADMIN — MUPIROCIN CALCIUM 1 APPLICATION(S): 20 CREAM TOPICAL at 05:19

## 2024-08-03 RX ADMIN — GABAPENTIN 100 MILLIGRAM(S): 400 CAPSULE ORAL at 22:01

## 2024-08-03 RX ADMIN — Medication 1000 MILLIGRAM(S): at 22:01

## 2024-08-03 RX ADMIN — FUROSEMIDE 20 MILLIGRAM(S): 10 INJECTION, SOLUTION INTRAVENOUS at 15:36

## 2024-08-03 RX ADMIN — Medication 6 UNIT(S): at 07:13

## 2024-08-03 RX ADMIN — MIDODRINE HYDROCHLORIDE 10 MILLIGRAM(S): 2.5 TABLET ORAL at 08:28

## 2024-08-03 NOTE — CHART NOTE - NSCHARTNOTEFT_GEN_A_CORE
driss Removal:    Pt seen and examined at bedside.  Case discussed with Dr. Mills and is recommending removal of blakes.  Minimal output from blakes and removed without incident.  Occlusive dressing placed. Follow up CXR with no obvious PTX noted.  Pt tolerated procedure well and remained hemodynamically stable.

## 2024-08-03 NOTE — PROGRESS NOTE ADULT - SUBJECTIVE AND OBJECTIVE BOX
CTICU  CRITICAL  CARE  attending     Hand off received 					   Pertinent clinical, laboratory, radiographic, hemodynamic, echocardiographic, respiratory data, microbiologic data and chart were reviewed and analyzed frequently throughout the course of the day and night  Patient seen and examined with CTS/ SH attending at bedside  Pt is a 64y , Male, HEALTH ISSUES - PROBLEM Dx:      , FAMILY HISTORY:  PAST MEDICAL & SURGICAL HISTORY:  DM (diabetes mellitus)      Cardiac arrest      HTN (hypertension)      HLD (hyperlipidemia)      Status post insertion of percutaneous endoscopic gastrostomy (PEG) tube        Patient is a 64y old  Male who presents with a chief complaint of CAD (02 Aug 2024 11:54)      14 system review was unremarkable    Vital signs, hemodynamic and respiratory parameters were reviewed from the bedside nursing flowsheet.  ICU Vital Signs Last 24 Hrs  T(C): 36.6 (03 Aug 2024 14:07), Max: 37.1 (03 Aug 2024 01:01)  T(F): 97.8 (03 Aug 2024 14:07), Max: 98.8 (03 Aug 2024 01:01)  HR: 96 (03 Aug 2024 15:00) (75 - 106)  BP: 121/57 (03 Aug 2024 15:00) (100/52 - 121/57)  BP(mean): 81 (03 Aug 2024 15:00) (71 - 81)  ABP: 108/38 (03 Aug 2024 13:00) (94/37 - 152/69)  ABP(mean): 60 (03 Aug 2024 13:00) (54 - 99)  RR: 18 (03 Aug 2024 15:00) (16 - 20)  SpO2: 100% (03 Aug 2024 15:00) (95% - 100%)    O2 Parameters below as of 03 Aug 2024 15:00  Patient On (Oxygen Delivery Method): room air          Adult Advanced Hemodynamics Last 24 Hrs  CVP(mm Hg): 5 (03 Aug 2024 13:00) (-2 - 36)  CVP(cm H2O): --  CO: --  CI: --  PA: --  PA(mean): --  PCWP: --  SVR: --  SVRI: --  PVR: --  PVRI: --, ABG - ( 03 Aug 2024 02:19 )  pH, Arterial: 7.45  pH, Blood: x     /  pCO2: 38    /  pO2: 70    / HCO3: 26    / Base Excess: 2.4   /  SaO2: 97.1                Intake and output was reviewed and the fluid balance was calculated  Daily     Daily   I&O's Summary    02 Aug 2024 07:01  -  03 Aug 2024 07:00  --------------------------------------------------------  IN: 2320.6 mL / OUT: 2170 mL / NET: 150.6 mL    03 Aug 2024 07:01  -  03 Aug 2024 16:21  --------------------------------------------------------  IN: 310 mL / OUT: 200 mL / NET: 110 mL        All lines and drain sites were assessed  [facility]      POCT Blood Glucose.: 222 mg/dL (03 Aug 2024 11:16)    No acute change in mental status  Auscultation of the chest reveals equal bs  Abdomen is soft  Extremities are warm and well perfused  Wounds appear clean and unremarkable  Antibiotics are periop    labs  CBC Full  -  ( 03 Aug 2024 13:33 )  WBC Count : 6.75 K/uL  RBC Count : 3.40 M/uL  Hemoglobin : 8.9 g/dL  Hematocrit : 26.8 %  Platelet Count - Automated : 115 K/uL  Mean Cell Volume : 78.8 fl  Mean Cell Hemoglobin : 26.2 pg  Mean Cell Hemoglobin Concentration : 33.2 gm/dL  Auto Neutrophil # : 4.45 K/uL  Auto Lymphocyte # : 1.29 K/uL  Auto Monocyte # : 0.63 K/uL  Auto Eosinophil # : 0.33 K/uL  Auto Basophil # : 0.04 K/uL  Auto Neutrophil % : 66.0 %  Auto Lymphocyte % : 19.1 %  Auto Monocyte % : 9.3 %  Auto Eosinophil % : 4.9 %  Auto Basophil % : 0.6 %    08-03    140  |  107  |  18  ----------------------------<  177<H>  4.1   |  24  |  0.83    Ca    8.3<L>      03 Aug 2024 13:33  Phos  1.8     08-03  Mg     1.8     08-03    TPro  5.8<L>  /  Alb  3.7  /  TBili  0.6  /  DBili  x   /  AST  20  /  ALT  9<L>  /  AlkPhos  50  08-03    PT/INR - ( 03 Aug 2024 13:33 )   PT: 11.2 sec;   INR: 0.98          PTT - ( 03 Aug 2024 13:33 )  PTT:28.6 sec  The current medications were reviewed   MEDICATIONS  (STANDING):  acetaminophen     Tablet .. 1000 milliGRAM(s) Oral every 6 hours  ascorbic acid 500 milliGRAM(s) Oral two times a day  aspirin enteric coated 81 milliGRAM(s) Oral daily  atorvastatin 80 milliGRAM(s) Oral at bedtime  bisacodyl Suppository 10 milliGRAM(s) Rectal once  chlorhexidine 2% Cloths 1 Application(s) Topical daily  clopidogrel Tablet 75 milliGRAM(s) Oral daily  dextrose 5%. 1000 milliLiter(s) (100 mL/Hr) IV Continuous <Continuous>  dextrose 5%. 1000 milliLiter(s) (50 mL/Hr) IV Continuous <Continuous>  dextrose 50% Injectable 25 Gram(s) IV Push once  dextrose 50% Injectable 25 Gram(s) IV Push once  dextrose 50% Injectable 12.5 Gram(s) IV Push once  fludroCORTISONE 0.1 milliGRAM(s) Oral daily  gabapentin 100 milliGRAM(s) Oral every 8 hours  glucagon  Injectable 1 milliGRAM(s) IntraMuscular once  heparin   Injectable 5000 Unit(s) SubCutaneous every 12 hours  insulin glargine Injectable (LANTUS) 14 Unit(s) SubCutaneous at bedtime  insulin lispro (ADMELOG) corrective regimen sliding scale   SubCutaneous Before meals and at bedtime  insulin lispro Injectable (ADMELOG) 6 Unit(s) SubCutaneous three times a day before meals  lidocaine   4% Patch 1 Patch Transdermal daily  midodrine 5 milliGRAM(s) Oral every 8 hours  mupirocin 2% Ointment 1 Application(s) Both Nostrils two times a day  pantoprazole    Tablet 40 milliGRAM(s) Oral before breakfast  polyethylene glycol 3350 17 Gram(s) Oral daily  potassium chloride    Tablet ER 20 milliEquivalent(s) Oral daily  senna 2 Tablet(s) Oral at bedtime  sodium chloride 0.9%. 1000 milliLiter(s) (10 mL/Hr) IV Continuous <Continuous>    MEDICATIONS  (PRN):  dextrose Oral Gel 15 Gram(s) Oral once PRN Blood Glucose LESS THAN 70 milliGRAM(s)/deciliter       PROBLEM LIST/ ASSESSMENT:  HEALTH ISSUES - PROBLEM Dx:      ,   Patient is a 64y old  Male who presents with a chief complaint of CAD (02 Aug 2024 11:54)     s/p cardiac surgery    Acute systolic and diastolic heart failure evidenced by SOB and parenchymal infiltrates; will treat with diuresis      Acute blood loss anemia with relative hypotension treated with > 1 unit PC    Acute post operative pulmonary insufficiency ruled in due to hypoxemia, O2 sats < 91% on RA treated with HFNC        My plan includes :  close hemodynamic, ventilatory and drain monitoring and management per post op routine    Monitor for arrhythmias and monitor parameters for organ perfusion  beta blockade not administered due to hemodynamic instability and bradycardia  monitor neurologic status  Head of the bed should remain elevated to 45 deg .   chest PT and IS will be encouraged  monitor adequacy of oxygenation and ventilation and attempt to wean oxygen  antibiotic regimen will be tailored to the clinical, laboratory and microbiologic data  Nutritional goals will be met using po eventually , ensure adequate caloric intake and montior the same  Stress ulcer and VTE prophylaxis will be achieved    Glycemic control is satisfactory  Electrolytes have been repleted as necessary and wound care has been carried out. Pain control has been achieved.   agressive physical therapy and early mobility and ambulation goals will be met   The family was updated about the course and plan  CRITICAL CARE TIME Upon my evaluation, this patient had a high probability of imminent or life-threatening deterioration due to the above problems which required my direct attention, intervention, and personal management.  I have personally provided 150 minutes of critical care time exclusive of time spent on separately billable procedures. Time included review of laboratory data, radiology results, discussion with consultants, and monitoring for potential decompensation. Interventions were performed as documented abovepersonally provided by me  in evaluation and management, reassessments, review and interpretation of labs and x-rays, ventilator and hemodynamic management, formulating a plan and coordinating care: ___150___ MIN.  Time does not include procedural time.    CTICU ATTENDING     					    Lucas Dawkins MD

## 2024-08-04 LAB
ALBUMIN SERPL ELPH-MCNC: 3.6 G/DL — SIGNIFICANT CHANGE UP (ref 3.3–5)
ALP SERPL-CCNC: 49 U/L — SIGNIFICANT CHANGE UP (ref 40–120)
ALT FLD-CCNC: 9 U/L — LOW (ref 10–45)
ANION GAP SERPL CALC-SCNC: 8 MMOL/L — SIGNIFICANT CHANGE UP (ref 5–17)
APTT BLD: 30.2 SEC — SIGNIFICANT CHANGE UP (ref 24.5–35.6)
AST SERPL-CCNC: 18 U/L — SIGNIFICANT CHANGE UP (ref 10–40)
BASOPHILS # BLD AUTO: 0.03 K/UL — SIGNIFICANT CHANGE UP (ref 0–0.2)
BASOPHILS NFR BLD AUTO: 0.5 % — SIGNIFICANT CHANGE UP (ref 0–2)
BILIRUB SERPL-MCNC: 0.9 MG/DL — SIGNIFICANT CHANGE UP (ref 0.2–1.2)
BUN SERPL-MCNC: 14 MG/DL — SIGNIFICANT CHANGE UP (ref 7–23)
CALCIUM SERPL-MCNC: 8.4 MG/DL — SIGNIFICANT CHANGE UP (ref 8.4–10.5)
CHLORIDE SERPL-SCNC: 108 MMOL/L — SIGNIFICANT CHANGE UP (ref 96–108)
CO2 SERPL-SCNC: 29 MMOL/L — SIGNIFICANT CHANGE UP (ref 22–31)
CREAT SERPL-MCNC: 0.82 MG/DL — SIGNIFICANT CHANGE UP (ref 0.5–1.3)
EGFR: 99 ML/MIN/1.73M2 — SIGNIFICANT CHANGE UP
EOSINOPHIL # BLD AUTO: 0.39 K/UL — SIGNIFICANT CHANGE UP (ref 0–0.5)
EOSINOPHIL NFR BLD AUTO: 7.1 % — HIGH (ref 0–6)
GLUCOSE BLDC GLUCOMTR-MCNC: 109 MG/DL — HIGH (ref 70–99)
GLUCOSE BLDC GLUCOMTR-MCNC: 156 MG/DL — HIGH (ref 70–99)
GLUCOSE BLDC GLUCOMTR-MCNC: 239 MG/DL — HIGH (ref 70–99)
GLUCOSE BLDC GLUCOMTR-MCNC: 96 MG/DL — SIGNIFICANT CHANGE UP (ref 70–99)
GLUCOSE SERPL-MCNC: 85 MG/DL — SIGNIFICANT CHANGE UP (ref 70–99)
HCT VFR BLD CALC: 26.8 % — LOW (ref 39–50)
HGB BLD-MCNC: 8.8 G/DL — LOW (ref 13–17)
IMM GRANULOCYTES NFR BLD AUTO: 0.2 % — SIGNIFICANT CHANGE UP (ref 0–0.9)
INR BLD: 0.95 — SIGNIFICANT CHANGE UP (ref 0.85–1.18)
LYMPHOCYTES # BLD AUTO: 1.32 K/UL — SIGNIFICANT CHANGE UP (ref 1–3.3)
LYMPHOCYTES # BLD AUTO: 23.9 % — SIGNIFICANT CHANGE UP (ref 13–44)
MAGNESIUM SERPL-MCNC: 1.9 MG/DL — SIGNIFICANT CHANGE UP (ref 1.6–2.6)
MCHC RBC-ENTMCNC: 26.7 PG — LOW (ref 27–34)
MCHC RBC-ENTMCNC: 32.8 GM/DL — SIGNIFICANT CHANGE UP (ref 32–36)
MCV RBC AUTO: 81.2 FL — SIGNIFICANT CHANGE UP (ref 80–100)
MONOCYTES # BLD AUTO: 0.59 K/UL — SIGNIFICANT CHANGE UP (ref 0–0.9)
MONOCYTES NFR BLD AUTO: 10.7 % — SIGNIFICANT CHANGE UP (ref 2–14)
NEUTROPHILS # BLD AUTO: 3.18 K/UL — SIGNIFICANT CHANGE UP (ref 1.8–7.4)
NEUTROPHILS NFR BLD AUTO: 57.6 % — SIGNIFICANT CHANGE UP (ref 43–77)
NRBC # BLD: 0 /100 WBCS — SIGNIFICANT CHANGE UP (ref 0–0)
PHOSPHATE SERPL-MCNC: 2.8 MG/DL — SIGNIFICANT CHANGE UP (ref 2.5–4.5)
PLATELET # BLD AUTO: 125 K/UL — LOW (ref 150–400)
POTASSIUM SERPL-MCNC: 3.6 MMOL/L — SIGNIFICANT CHANGE UP (ref 3.5–5.3)
POTASSIUM SERPL-SCNC: 3.6 MMOL/L — SIGNIFICANT CHANGE UP (ref 3.5–5.3)
PROT SERPL-MCNC: 5.7 G/DL — LOW (ref 6–8.3)
PROTHROM AB SERPL-ACNC: 10.9 SEC — SIGNIFICANT CHANGE UP (ref 9.5–13)
RBC # BLD: 3.3 M/UL — LOW (ref 4.2–5.8)
RBC # FLD: 18 % — HIGH (ref 10.3–14.5)
SODIUM SERPL-SCNC: 145 MMOL/L — SIGNIFICANT CHANGE UP (ref 135–145)
WBC # BLD: 5.52 K/UL — SIGNIFICANT CHANGE UP (ref 3.8–10.5)
WBC # FLD AUTO: 5.52 K/UL — SIGNIFICANT CHANGE UP (ref 3.8–10.5)

## 2024-08-04 PROCEDURE — 71045 X-RAY EXAM CHEST 1 VIEW: CPT | Mod: 26

## 2024-08-04 PROCEDURE — 99233 SBSQ HOSP IP/OBS HIGH 50: CPT

## 2024-08-04 RX ORDER — FERROUS SULFATE 325(65) MG
325 TABLET ORAL DAILY
Refills: 0 | Status: DISCONTINUED | OUTPATIENT
Start: 2024-08-04 | End: 2024-08-05

## 2024-08-04 RX ORDER — BACTERIOSTATIC SODIUM CHLORIDE 0.9 %
3 VIAL (ML) INJECTION EVERY 8 HOURS
Refills: 0 | Status: DISCONTINUED | OUTPATIENT
Start: 2024-08-04 | End: 2024-08-05

## 2024-08-04 RX ADMIN — Medication 4: at 11:06

## 2024-08-04 RX ADMIN — Medication 6 UNIT(S): at 11:07

## 2024-08-04 RX ADMIN — HEPARIN SODIUM 5000 UNIT(S): 1000 INJECTION, SOLUTION INTRAVENOUS; SUBCUTANEOUS at 05:12

## 2024-08-04 RX ADMIN — Medication 1000 MILLIGRAM(S): at 06:00

## 2024-08-04 RX ADMIN — FLUDROCORTISONE ACETATE 0.1 MILLIGRAM(S): 0.1 TABLET ORAL at 05:13

## 2024-08-04 RX ADMIN — Medication 3 MILLILITER(S): at 21:03

## 2024-08-04 RX ADMIN — Medication 6 UNIT(S): at 06:51

## 2024-08-04 RX ADMIN — CLOPIDOGREL BISULFATE 75 MILLIGRAM(S): 75 TABLET, FILM COATED ORAL at 11:07

## 2024-08-04 RX ADMIN — Medication 500 MILLIGRAM(S): at 17:03

## 2024-08-04 RX ADMIN — Medication 1 PACKET(S): at 05:13

## 2024-08-04 RX ADMIN — POTASSIUM CHLORIDE 20 MILLIEQUIVALENT(S): 1500 TABLET, EXTENDED RELEASE ORAL at 11:07

## 2024-08-04 RX ADMIN — Medication 500 MILLIGRAM(S): at 05:13

## 2024-08-04 RX ADMIN — CHLORHEXIDINE GLUCONATE 1 APPLICATION(S): 500 CLOTH TOPICAL at 05:12

## 2024-08-04 RX ADMIN — INSULIN GLARGINE-YFGN 14 UNIT(S): 100 INJECTION, SOLUTION SUBCUTANEOUS at 21:27

## 2024-08-04 RX ADMIN — ATORVASTATIN CALCIUM 80 MILLIGRAM(S): 40 TABLET, FILM COATED ORAL at 21:28

## 2024-08-04 RX ADMIN — HEPARIN SODIUM 5000 UNIT(S): 1000 INJECTION, SOLUTION INTRAVENOUS; SUBCUTANEOUS at 17:02

## 2024-08-04 RX ADMIN — GABAPENTIN 100 MILLIGRAM(S): 400 CAPSULE ORAL at 13:04

## 2024-08-04 RX ADMIN — Medication 3 MILLILITER(S): at 13:08

## 2024-08-04 RX ADMIN — MUPIROCIN CALCIUM 1 APPLICATION(S): 20 CREAM TOPICAL at 05:01

## 2024-08-04 RX ADMIN — Medication 1000 MILLIGRAM(S): at 11:07

## 2024-08-04 RX ADMIN — Medication 1 PACKET(S): at 17:02

## 2024-08-04 RX ADMIN — GABAPENTIN 100 MILLIGRAM(S): 400 CAPSULE ORAL at 21:28

## 2024-08-04 RX ADMIN — GABAPENTIN 100 MILLIGRAM(S): 400 CAPSULE ORAL at 05:12

## 2024-08-04 RX ADMIN — Medication 6 UNIT(S): at 16:28

## 2024-08-04 RX ADMIN — FUROSEMIDE 40 MILLIGRAM(S): 10 INJECTION, SOLUTION INTRAVENOUS at 05:13

## 2024-08-04 RX ADMIN — Medication 81 MILLIGRAM(S): at 11:07

## 2024-08-04 RX ADMIN — Medication 1000 MILLIGRAM(S): at 05:13

## 2024-08-04 RX ADMIN — Medication 2: at 16:27

## 2024-08-04 RX ADMIN — PANTOPRAZOLE SODIUM 40 MILLIGRAM(S): 20 TABLET, DELAYED RELEASE ORAL at 05:13

## 2024-08-04 RX ADMIN — Medication 1000 MILLIGRAM(S): at 11:14

## 2024-08-04 NOTE — DISCHARGE NOTE PROVIDER - NSDCCPTREATMENT_GEN_ALL_CORE_FT
PRINCIPAL PROCEDURE  Procedure: CABG, with DUNCAN  Findings and Treatment: OPCABx3 (LIMA to LAD, RA to OM, SVG to PDA) EF 30%

## 2024-08-04 NOTE — DISCHARGE NOTE PROVIDER - CARE PROVIDER_API CALL
Garland Ardon  Thoracic and Cardiac Surgery  [address]  [place] [address]  Phone: [phone]  Fax: [phone]  Follow Up Time: 1 week    Saeid Monte  Cardiovascular Disease  [address] [place] [id number]  Phone: [phone]  Fax: [phone]  Follow Up Time: 2 weeks   Garland Ardon  Thoracic and Cardiac Surgery  [address]  [place] [address]  Phone: [phone]  Fax: [phone]  Scheduled Appointment: 08/13/2024 11:15 AM    Saeid Monte  Cardiovascular Disease  [address] [place] [id number]  Phone: [phone]  Fax: [phone]  Follow Up Time: 2 weeks

## 2024-08-04 NOTE — DISCHARGE NOTE PROVIDER - NSDCHHNEEDSERVICE_GEN_ALL_CORE
Observation and assessment Medication teaching and assessment/Observation and assessment/Wound care and assessment

## 2024-08-04 NOTE — DISCHARGE NOTE PROVIDER - NSDCMRMEDTOKEN_GEN_ALL_CORE_FT
aspirin 81 mg oral tablet, chewable: 1 tab(s) chewed once a day  isosorbide mononitrate 30 mg oral tablet, extended release: 1 tab(s) orally once a day  Jardiance 25 mg oral tablet: 1 tab(s) orally once a day  Lantus 100 units/mL subcutaneous solution: 10 unit(s) subcutaneous once a day in the AM  Lipitor 80 mg oral tablet: 1 tab(s) orally once a day (at bedtime)  Metoprolol Succinate ER 50 mg oral tablet, extended release: 1 tab(s) orally once a day  nitroglycerin 0.4 mg sublingual tablet: 1 tab(s) sublingually every 5 minutes as needed for  chest pain  Plavix 75 mg oral tablet: 1 tab(s) orally once a day  sacubitril-valsartan 24 mg-26 mg oral tablet: 1 tab(s) orally 2 times a day   acetaminophen 325 mg oral tablet: 2 tab(s) orally every 6 hours as needed for Mild Pain (1 - 3)  aspirin 81 mg oral tablet, chewable: 1 tab(s) chewed once a day  ferrous sulfate 325 mg (65 mg elemental iron) oral tablet: 1 tab(s) orally once a day  Jardiance 10 mg oral tablet: 1 tab(s) orally once a day  Lantus 100 units/mL subcutaneous solution: 10 unit(s) subcutaneous once a day in the AM  Lipitor 80 mg oral tablet: 1 tab(s) orally once a day (at bedtime)  metoprolol succinate 25 mg oral tablet, extended release: 1 tab(s) orally once a day  oxyCODONE 5 mg oral tablet: 1 tab(s) orally every 6 hours as needed for Moderate Pain (4 - 6) MDD: 4  pantoprazole 40 mg oral delayed release tablet: 1 tab(s) orally once a day (before a meal)  Plavix 75 mg oral tablet: 1 tab(s) orally once a day  polyethylene glycol 3350 oral powder for reconstitution: 17 gram(s) orally once a day  senna leaf extract oral tablet: 2 tab(s) orally once a day (at bedtime)  valsartan 40 mg oral tablet: 1 tab(s) orally every 12 hours   acetaminophen 325 mg oral tablet: 2 tab(s) orally every 6 hours as needed for Mild Pain (1 - 3)  aspirin 81 mg oral tablet, chewable: 1 tab(s) chewed once a day  ferrous sulfate 325 mg (65 mg elemental iron) oral tablet: 1 tab(s) orally once a day  HumaLOG 100 units/mL injectable solution: 1 unit(s) injectable 3 times a day Please continue sliding scale  Jardiance 25 mg oral tablet: 1 tab(s) orally once a day  Lantus Solostar Pen 100 units/mL subcutaneous solution: 10 unit(s) subcutaneous once a day  Lipitor 80 mg oral tablet: 1 tab(s) orally once a day (at bedtime)  metoprolol succinate 25 mg oral tablet, extended release: 1 tab(s) orally once a day  oxyCODONE 5 mg oral tablet: 1 tab(s) orally every 6 hours as needed for Moderate Pain (4 - 6) MDD: 4  pantoprazole 40 mg oral delayed release tablet: 1 tab(s) orally once a day (before a meal)  Plavix 75 mg oral tablet: 1 tab(s) orally once a day  polyethylene glycol 3350 oral powder for reconstitution: 17 gram(s) orally once a day  senna leaf extract oral tablet: 2 tab(s) orally once a day (at bedtime)  valsartan 40 mg oral tablet: 1 tab(s) orally every 12 hours

## 2024-08-04 NOTE — PROGRESS NOTE ADULT - SUBJECTIVE AND OBJECTIVE BOX
CTICU  CRITICAL  CARE  attending     Hand off received 					   Pertinent clinical, laboratory, radiographic, hemodynamic, echocardiographic, respiratory data, microbiologic data and chart were reviewed and analyzed frequently throughout the course of the day  Patient seen and examined with CTS/ SH attending at bedside  Pt is a 64yr old male with PMH HTN, HLD, HFrEF (25%, 2024), L retinal detachment sp repair cb residual L eye blindness, recent admission in January for respiratory arrest sp trach and PEG placement  with reversal, course cb by R PCA infarct; admission 2024 for SOB and found to have new onset heart failure with cardiac cath revealing multivessel CAD prompting tx to [facility], was dc with plan for return for operative plan. Pt underwent OpCABG x 3 (LIMA-LAD, RA-OM, SVG-PDA, EF 30%) with Dr. Ardon 24. Arrived intubated on levophed.  added. Extubated. :  decreased to 2.5. Midodrine started. Given 2 pRBCs. :  off this AM and back on levophed. On and off levo for the day. Ambulated well. 8/3: Fludrocortisone started.       FAMILY HISTORY:  PAST MEDICAL & SURGICAL HISTORY:  DM (diabetes mellitus)  Cardiac arrest  HTN (hypertension)  HLD (hyperlipidemia)  Status post insertion of percutaneous endoscopic gastrostomy (PEG) tube        Patient is a 64y old  Male who presents with a chief complaint of CAD.      14 system review was unremarkable    Vital signs, hemodynamic and respiratory parameters were reviewed from the bedside nursing flowsheet.  ICU Vital Signs Last 24 Hrs  T(C): 36.7 (04 Aug 2024 05:09), Max: 36.7 (04 Aug 2024 05:09)  T(F): 98 (04 Aug 2024 05:09), Max: 98 (04 Aug 2024 05:09)  HR: 80 (04 Aug 2024 08:00) (80 - 96)  BP: 94/51 (04 Aug 2024 08:00) (91/52 - 124/72)  BP(mean): 69 (04 Aug 2024 08:00) (67 - 88)  ABP: 108/38 (03 Aug 2024 13:00) (102/33 - 140/52)  ABP(mean): 60 (03 Aug 2024 13:00) (54 - 84)  RR: 18 (04 Aug 2024 08:00) (16 - 18)  SpO2: 99% (04 Aug 2024 08:00) (97% - 100%)    O2 Parameters below as of 04 Aug 2024 08:00  Patient On (Oxygen Delivery Method): room air          Adult Advanced Hemodynamics Last 24 Hrs  CVP(mm Hg): 5 (03 Aug 2024 13:00) (1 - 5)  CVP(cm H2O): --  CO: --  CI: --  PA: --  PA(mean): --  PCWP: --  SVR: --  SVRI: --  PVR: --  PVRI: --, ABG - ( 03 Aug 2024 02:19 )  pH, Arterial: 7.45  pH, Blood: x     /  pCO2: 38    /  pO2: 70    / HCO3: 26    / Base Excess: 2.4   /  SaO2: 97.1                Intake and output was reviewed and the fluid balance was calculated  Daily     Daily   I&O's Summary    03 Aug 2024 07:01  -  04 Aug 2024 07:00  --------------------------------------------------------  IN: 310 mL / OUT: 600 mL / NET: -290 mL    04 Aug 2024 07:01  -  04 Aug 2024 08:57  --------------------------------------------------------  IN: 0 mL / OUT: 800 mL / NET: -800 mL        All lines and drain sites were assessed  Glycemic trend was reviewedCAPILLARY BLOOD GLUCOSE      POCT Blood Glucose.: 109 mg/dL (04 Aug 2024 06:49)      Neuro: nad  HEENT: mmm  Heart: s1 s2  Lungs: clear bl  Abdomen: soft nt nd, prior peg site   Extremities: wwp    Lines:  RIJ TLC     Tubes:  Med x 2      labs  CBC Full  -  ( 04 Aug 2024 03:21 )  WBC Count : 5.52 K/uL  RBC Count : 3.30 M/uL  Hemoglobin : 8.8 g/dL  Hematocrit : 26.8 %  Platelet Count - Automated : 125 K/uL  Mean Cell Volume : 81.2 fl  Mean Cell Hemoglobin : 26.7 pg  Mean Cell Hemoglobin Concentration : 32.8 gm/dL  Auto Neutrophil # : 3.18 K/uL  Auto Lymphocyte # : 1.32 K/uL  Auto Monocyte # : 0.59 K/uL  Auto Eosinophil # : 0.39 K/uL  Auto Basophil # : 0.03 K/uL  Auto Neutrophil % : 57.6 %  Auto Lymphocyte % : 23.9 %  Auto Monocyte % : 10.7 %  Auto Eosinophil % : 7.1 %  Auto Basophil % : 0.5 %    08-04    145  |  108  |  14  ----------------------------<  85  3.6   |  29  |  0.82    Ca    8.4      04 Aug 2024 03:21  Phos  2.8     08-04  Mg     1.9     08-04    TPro  5.7<L>  /  Alb  3.6  /  TBili  0.9  /  DBili  x   /  AST  18  /  ALT  9<L>  /  AlkPhos  49  08-04    PT/INR - ( 04 Aug 2024 03:21 )   PT: 10.9 sec;   INR: 0.95          PTT - ( 04 Aug 2024 03:21 )  PTT:30.2 sec  The current medications were reviewed   MEDICATIONS  (STANDING):  acetaminophen     Tablet .. 1000 milliGRAM(s) Oral every 6 hours  ascorbic acid 500 milliGRAM(s) Oral two times a day  aspirin enteric coated 81 milliGRAM(s) Oral daily  atorvastatin 80 milliGRAM(s) Oral at bedtime  bisacodyl Suppository 10 milliGRAM(s) Rectal once  chlorhexidine 2% Cloths 1 Application(s) Topical daily  clopidogrel Tablet 75 milliGRAM(s) Oral daily  dextrose 5%. 1000 milliLiter(s) (100 mL/Hr) IV Continuous <Continuous>  dextrose 5%. 1000 milliLiter(s) (50 mL/Hr) IV Continuous <Continuous>  dextrose 50% Injectable 12.5 Gram(s) IV Push once  dextrose 50% Injectable 25 Gram(s) IV Push once  dextrose 50% Injectable 25 Gram(s) IV Push once  fludroCORTISONE 0.1 milliGRAM(s) Oral daily  furosemide    Tablet 40 milliGRAM(s) Oral daily  gabapentin 100 milliGRAM(s) Oral every 8 hours  glucagon  Injectable 1 milliGRAM(s) IntraMuscular once  heparin   Injectable 5000 Unit(s) SubCutaneous every 12 hours  insulin glargine Injectable (LANTUS) 14 Unit(s) SubCutaneous at bedtime  insulin lispro (ADMELOG) corrective regimen sliding scale   SubCutaneous Before meals and at bedtime  insulin lispro Injectable (ADMELOG) 6 Unit(s) SubCutaneous three times a day before meals  lidocaine   4% Patch 1 Patch Transdermal daily  midodrine 5 milliGRAM(s) Oral every 8 hours  mupirocin 2% Ointment 1 Application(s) Both Nostrils two times a day  pantoprazole    Tablet 40 milliGRAM(s) Oral before breakfast  polyethylene glycol 3350 17 Gram(s) Oral daily  potassium chloride    Tablet ER 20 milliEquivalent(s) Oral daily  potassium phosphate / sodium phosphate Powder (PHOS-NaK) 1 Packet(s) Oral two times a day  senna 2 Tablet(s) Oral at bedtime  sodium chloride 0.9%. 1000 milliLiter(s) (10 mL/Hr) IV Continuous <Continuous>    MEDICATIONS  (PRN):  dextrose Oral Gel 15 Gram(s) Oral once PRN Blood Glucose LESS THAN 70 milliGRAM(s)/deciliter      Assessment/Plan:  64yr old male with PMH HTN, HLD, HFrEF (25%, 2024), L retinal detachment sp repair cb residual L eye blindness, recent admission in January for respiratory arrest sp trach and PEG placement  with reversal, course cb by R PCA infarct; admission 2024 for SOB and found to have new onset heart failure with cardiac cath revealing multivessel CAD prompting tx to [facility], was dc with plan for return for operative plan.     POD3 OpCABG x 3 (LIMA-LAD, RA-OM, SVG-PDA, EF 30%), Ardon, 24)   Acute post operative anemia due to acute blood loss-trend H/H  Thrombocytopenia-monitor  Aspirin  Plavix  Wean midodrine to off  Fludrocortisone started  Diet as tolerated  Replete lytes prn  Monitor CT output  GI/DVT PPX  Bowel Regimen  Pain control  OOB with PT  Close hemodynamic, ventilatory and drain monitoring and management per post op routine  Monitor for arrhythmias and monitor parameters for organ perfusion  Monitor neurologic status  Head of the bed should remain elevated to 45 deg   Chest PT and IS will be encouraged  Monitor adequacy of oxygenation and ventilation and attempt to wean oxygen  Antibiotic regimen will be tailored to the clinical, laboratory and microbiologic data  Nutritional goals will be met using po eventually, ensure adequate caloric intake and monitor the same  Stress ulcer and VTE prophylaxis will be achieved    Glycemic control is satisfactory  Electrolytes have been repleted as necessary and wound care has been carried out   Pain control has been achieved.   Aggressive physical therapy and early mobility and ambulation goals will be met   The family was updated about the course and plan.    CRITICAL CARE TIME personally provided by me  in evaluation and management, reassessments, review and interpretation of labs and x-rays, ventilator and hemodynamic management, formulating a plan and coordinating care: __35____ MIN.  Time does not include procedural time.      CTICU ATTENDING     					  Catrachita Kurtz MD

## 2024-08-04 NOTE — DISCHARGE NOTE PROVIDER - NSDCFUADDAPPT_GEN_ALL_CORE_FT
- Walk daily as tolerated and use your incentive spirometer 10 times every hour while you are awake.     - Weigh yourself daily. If you notice over a 3 pound weight gain in 3 days, this is a sign you are likely retaining too much fluid. It is imperative you call our right away with unexplained rapid weight gain.      - Please continue to wear the compression stockings given to you in the hospital at home. This is a way to prevent fluid from building up in your legs.     - No driving or strenuous activity/exercise until cleared by your surgeon.    - Gently clean your incisions with unscented/antibacterial soap and water, pat dry.  You may leave them open to air.    - Call your doctor if you have shortness of breath, chest pain not relieved by pain medication, dizziness, fever >101.5, or increased redness or drainage from incisions.

## 2024-08-04 NOTE — DISCHARGE NOTE PROVIDER - NSDCHHBASESERVICE_GEN_ALL_CORE
68 y old left handed woman with PMHx of COPD, migraines, depressions, seizures and lung adenocarcinoma with R occipito-parietal mets s/p resection, stereotactic radiosurgery, and chemotherapy presenting with worsening AMS, lethargy, confusion, stomach upset w/ black stools, and pins and needles sensation in the toes over the past 10 days following her recent d/c from the EMU on 9/6/2020. She was admitted to [facility] on 8/25/2020 due to an episode of disorientation followed by a seizure with L eye deviation and subsequent GTC. She continued to have clinical seizures on admission and was found to have a UTI, was subsequently intubated for airway protection and admitted to the MICU. Keppra 1000 mg BID was started for seizure prophylaxis and she was subsequently extubated with improvement in her EEG and discharged. She followed up outpatient  after discharge on 9/2 with Dr. Schaefer and her daughter stated at that time she was concerned as the patient was persistently confused and altered from baseline. Keppra 1000 mg BID was continued and she was given a referral for psychiatry to address her ongoing depression. The patient was re-admitted 9/3 due to persisting disorientation, confusion, and visual illusions including macropsia and micropsia. At that time the patient stated that she was having difficulty focusing, constantly asked the same questions, and was often confused and disoriented to her location or situation. She was also found to have a UTI and hypothyroidism at that time, CT head (DATE?) was stable with no evidence of increasing vasogenic edema or any new intracranial lesions. She was discharged on depakote 750 mg BID for seizure prophylaxis. Since her discharge on 9/6 the patient states that she has been fatigued and unable to keep her head up throughout most of the day. 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 She states that she has been feeling "pins and needles" in her toes throughout the week. She also reports upset stomach and black stools during this time with no fever or chills. She presented to her outpatient appt with Dr. Awad (epileptologist) today 9/16/20 and had difficulties navigating the office and opening the door, raising concerns of medication toxicity.     Cancer history:   2/2016 CARLOTA adenocarcinoma  3/2016 CARLOTA lobectomy  6/2016 Completed adjuvant chemotherapy: 4 cycles of cisplatin and pemetrexed  3/14/2020 Headache and eye pain, MRI brain 2.2 x 1.6 x 2.5 cm enhancing lesion of R parietal cortical & subcortical region  3/15/2020 R occipital craniectomy Dr. Servin  - pathology c/w metastatic adenocarcinoma from lung which was PD-L1 30%+, BRCA 2+ (germline vs somatic unknown), KRAS G12C+.   4/13/2020 stereotactic radiosurgery Dr. Pennington  4/23/2020 Pembrolizumab - completed 7/2020     68 y old left handed woman with PMHx of COPD, migraines, depressions, seizures and lung adenocarcinoma with R occipito-parietal mets s/p resection, stereotactic radiosurgery, and chemotherapy presenting with worsening AMS, lethargy, confusion, stomach upset w/ black stools, and pins and needles sensation in the toes over the past 10 days following her recent d/c from the EMU on 9/6/2020 admitted now to EMU for concern for depakote toxicity and AED optimization.  Since her discharge on 9/6 the patient states that she has been fatigued and unable to keep her head up throughout most of the day. 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 She states that she has been feeling "pins and needles" in her toes throughout the week. She also reports upset stomach and black stools during this time with no fever or chills. She presented to her outpatient appt with Dr. Awad (epileptologist) today 9/18/20 and had difficulties navigating the office and opening the door, raising concerns of medication toxicity.     She was admitted to [facility] on 8/25/2020 due to an episode of disorientation followed by a seizure with L eye deviation and subsequent GTC. She continued to have clinical seizures on admission and was found to have a UTI, was subsequently intubated for airway protection and admitted to the MICU. Keppra 1000 mg BID was started for seizures and she was subsequently extubated with improvement in her EEG and discharged. She followed up outpatient  after discharge on 9/2 with Dr. Schaefer and her daughter stated at that time she was concerned as the patient was persistently confused and altered from baseline. Keppra 1000 mg BID was continued and she was given a referral for psychiatry to address her ongoing depression. The patient was re-admitted 9/3 due to persisting disorientation, confusion, and visual illusions including macropsia and micropsia. At that time the patient stated that she was having difficulty focusing, constantly asked the same questions, and was often confused and disoriented to her location or situation. She was also found to have a UTI and hypothyroidism at that time, CT head (September 3rd) was stable with no evidence of increasing vasogenic edema or any new intracranial lesions. She was discharged on depakote 750 mg BID and vimpat 100 q12h for seizures.    Presumed Seizure Semiology:  visuospatial disturbances: macropsia, micropsia, disorientation, anxiety/panic that originate from R parietal lobe, likely the TPO [place]    Cancer history:   2/2016 CARLOTA adenocarcinoma  3/2016 CARLOTA lobectomy  6/2016 Completed adjuvant chemotherapy: 4 cycles of cisplatin and pemetrexed  3/14/2020 Headache and eye pain, MRI brain 2.2 x 1.6 x 2.5 cm enhancing lesion of R parietal cortical & subcortical region  3/15/2020 R occipital craniectomy Dr. Servin  - pathology c/w metastatic adenocarcinoma from lung which was PD-L1 30%+, BRCA 2+ (germline vs somatic unknown), KRAS G12C+.   4/13/2020 stereotactic radiosurgery Dr. Pennington  4/23/2020 Pembrolizumab - completed 7/2020     Nursing/Physical therapy

## 2024-08-04 NOTE — DISCHARGE NOTE PROVIDER - NSDCFUSCHEDAPPT_GEN_ALL_CORE_FT
Christine Cueva  [facility] Physician [facility] [address]  Scheduled Appointment: 08/26/2024    Milad Macdonald  [facility] Physician [facility]  ENDOCRIN [address]  Scheduled Appointment: 10/01/2024     Garland Ardon  [facility]  CTSURG [address]  Scheduled Appointment: 08/13/2024    Christine Cueva  [facility]  HEARTFAIL [address]  Scheduled Appointment: 08/26/2024    Milad Macdonald  [facility]  ENDOCRIN [address]  Scheduled Appointment: 10/01/2024

## 2024-08-04 NOTE — DISCHARGE NOTE PROVIDER - PROVIDER TOKENS
PROVIDER:[TOKEN:[[id number]],FOLLOWUP:[1 week]],PROVIDER:[TOKEN:[[id number]],FOLLOWUP:[2 weeks]] PROVIDER:[TOKEN:[[id number]],SCHEDULEDAPPT:[08/13/2024],SCHEDULEDAPPTTIME:[11:15 AM]],PROVIDER:[TOKEN:[[id number]],FOLLOWUP:[2 weeks]]

## 2024-08-04 NOTE — DISCHARGE NOTE PROVIDER - HOSPITAL COURSE
Patient discussed on morning rounds with Dr. Ardon and Dr. Jimenez  Operation Date: 24: Duglas - OPCABx3 (LIMA to LAD, RA to OM, SVG to PDA)   DUNCAN with global hypokinesis, EF 30%  Primary Surgeon/Attending MD: Dr. Ardon  Referring Physician: Dr. 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 He was referred to Dr. Ardon for surgical evaluation and presented to [facility] on  for heart failure optimization and pre-surgical testing. HF team consulted and following. On 24 he underwent an uncomplicated OPCABx3 (LIMA to LAD, RA to OM, SVG to PDA) EF 30% with Dr. Ardon. He arrived to the CTICU intubated, on levo gtt.  started, levo off. Extubated. POD1  weaned. 1u pRBC, midodrine started. Levo off. POD2 Levo on then off. POD3 Delined. 1u pRBC. Ambulated halls. POD4 floor care. Midodrine weaned off. POD5 Patient feels well, has no complaints. Tolerating PO diet, satting well on room air, ambulating independently.    _ _ _ _ _ _ _ _ _ _ _ _   ***If patient had CVA this admission, call neuro to complete discharge NIHSS     DISCHARGE PHYSICAL EXAM:    _ _ _ _ _ _ _ _ _ _ _ _   REMOVAL CHECKLIST:         [ ] Epicardial wires         [ ] Stitches/tie downs,   If no, why?    _ _ _ _ _ _ _ _ _ _ _ _   MEDICATION DISCHARGE CHECKLIST     CABG         [ x] Aspirin, [  ] Contraindicated, Reason:         [x ] Plavix, [  ] Contraindicated, Reason:         [x ] Statin, [  ] Contraindicated, Reason:         [x ] Lasix , [  ] Contraindicated, Reason:              Duration:          [ ] Beta-Blocker, [  ] Contraindicated, Reason:   _ _ _ _ _ _ _ _ _ _ _   RELEVANT LABS/IMAGING:   _  _ _ _ _ _ _ _ _ _ _   CLINICAL FOLLOW UP NEEDS:      [ ] Lab work needed:      [ ] Imaging needed:      [ ] Home equipment            Type: (i.e. wound vac, pneumostat, prevena, wet/dry dressings, picc/midlines, MCOT, rick etc)   _ _ _ _ _ _ _ _ _ _ _ _   Over 35 minutes was spent with the patient reviewing the discharge material including medications, follow up appointments, recovery, concerning symptoms, and how to contact their health care providers if they have questions. Patient discussed on morning rounds with Dr. Ardon and Dr. Jimenez  Operation Date: 24: Duglas - OPCABx3 (LIMA to LAD, RA to OM, SVG to PDA)   DUNCAN with global hypokinesis, EF 30%  Primary Surgeon/Attending MD: Dr. Ardon  Referring Physician: Dr. 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 He was referred to Dr. Ardon for surgical evaluation and presented to [facility] on  for heart failure optimization and pre-surgical testing. HF team consulted and following. On 24 he underwent an uncomplicated OPCABx3 (LIMA to LAD, RA to OM, SVG to PDA) EF 30% with Dr. Ardon. He arrived to the CTICU intubated, on levo gtt.  started per heart failure team. Pt extubated that evening. POD1  weaned. 1u pRBC, midodrine started. POD2 Levo weaned off and gurwinder removed. Florinef started for low cortisol. POD 4 Midodrine weaned off. POD5 Pacing wires removed. CXR pa lateral preformed. Patient feels well, has no complaints. Tolerating PO diet, satting well on room air, ambulating independently. Pr Dr Ardon pt medically ready for discharge home. Per Heart failure team pt should start valsartan 40mg BID, metoprolol succinate 25mg daily and Jardiance 10, and no lasix, and is cleared for discharge home today from their perspective.     _ _ _ _ _ _ _ _ _ _ _ _     DISCHARGE PHYSICAL EXAM:  General: Walking around the room NAD  HEENT: NCAT MMM EOMI neck supple   Neurological: A&O x3, no focal deficits, strength 5/5 throughout  Cardiovascular: RRR, normal s1 s2, no M/R/G  Respiratory: CTA b/l, no W/R/R  Gastrointestinal:  soft, non-tender to palpation, non-distended  Extremities: WWP, trace pitting edema, no calf tenderness  Vascular: peripheral pulses palpable   Incision: MDI, CD&I, no drainage, no erythema, no sternal click, EVH site CDI with surrounding ecchymosis   _ _ _ _  _ _ _ _ _ _ _ _   REMOVAL CHECKLIST:         [ x] Epicardial wires         [x ] Stitches/tie downs,   If no, why?    _ _ _ _ _ _ _ _ _ _ _ _   MEDICATION DISCHARGE CHECKLIST     CABG         [ x] Aspirin, [  ] Contraindicated, Reason:         [x ] Plavix, [  ] Contraindicated, Reason:         [x ] Statin, [  ] Contraindicated, Reason:         [ ] Lasix , [  x] Contraindicated, Reason: euvolemic              Duration:          [x ] Beta-Blocker, [  ] Contraindicated, Reason:   _ _ _ _ _ _ _ _ _ _ _   RELEVANT LABS/IMAGING:   _  _ _ _ _ _ _ _ _ _ _     _ _ _ _ _ _ _ _ _ _ _ _   Over 35 minutes was spent with the patient reviewing the discharge material including medications, follow up appointments, recovery, concerning symptoms, and how to contact their health care providers if they have questions. Patient discussed on morning rounds with Dr. Ardon and Dr. Jimenez  Operation Date: 24: Duglas - OPCABx3 (LIMA to LAD, RA to OM, SVG to PDA)   DUNCAN with global hypokinesis, EF 30%  Primary Surgeon/Attending MD: Dr. Ardon  Referring Physician: Dr. 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 and was found to have HFrEF (EF 25%) and severe multi-vessel CAD on cardiac cath. He was referred to Dr. Ardon for surgical evaluation and presented to [facility] on  for heart failure optimization and pre-surgical testing. HF team consulted and following. On 24 he underwent an uncomplicated OPCABx3 (LIMA to LAD, RA to OM, SVG to PDA) EF 30% with Dr. Ardon. He arrived to the CTICU intubated, on levo gtt.  started per heart failure team. Pt extubated that evening. POD1  weaned. 1u pRBC, midodrine started. POD2 Levo weaned off and gurwinder removed. Florinef started for low cortisol. POD 4 Midodrine weaned off. POD5 Pacing wires removed. CXR pa lateral preformed. Patient feels well, has no complaints. Tolerating PO diet, satting well on room air, ambulating independently. Pr Dr Ardon pt medically ready for discharge home. Per Heart failure team pt should start valsartan 40mg BID, metoprolol succinate 25mg daily and home dose of Jardiance , and no lasix, and is cleared for discharge home today from their perspective. Per pt he takes lantus 10u daily, humolog sliding scale and jardiance 25 daily     _ _ _ _ _ _ _ _ _ _ _ _     DISCHARGE PHYSICAL EXAM:  General: Walking around the room NAD  HEENT: NCAT MMM EOMI neck supple   Neurological: A&O x3, no focal deficits, strength 5/5 throughout  Cardiovascular: RRR, normal s1 s2, no M/R/G  Respiratory: CTA b/l, no W/R/R  Gastrointestinal:  soft, non-tender to palpation, non-distended  Extremities: WWP, trace pitting edema, no calf tenderness  Vascular: peripheral pulses palpable   Incision: MDI, CD&I, no drainage, no erythema, no sternal click, EVH site CDI with surrounding ecchymosis   _ _ _ _  _ _ _ _ _ _ _ _   REMOVAL CHECKLIST:         [ x] Epicardial wires         [x ] Stitches/tie downs,   If no, why?    _ _ _ _ _ _ _ _ _ _ _ _   MEDICATION DISCHARGE CHECKLIST     CABG         [ x] Aspirin, [  ] Contraindicated, Reason:         [x ] Plavix, [  ] Contraindicated, Reason:         [x ] Statin, [  ] Contraindicated, Reason:         [ ] Lasix , [  x] Contraindicated, Reason: euvolemic              Duration:          [x ] Beta-Blocker, [  ] Contraindicated, Reason:   _ _ _ _ _ _ _ _ _ _ _   RELEVANT LABS/IMAGING:   _  _ _ _ _ _ _ _ _ _ _     _ _ _ _ _ _ _ _ _ _ _ _   Over 35 minutes was spent with the patient reviewing the discharge material including medications, follow up appointments, recovery, concerning symptoms, and how to contact their health care providers if they have questions.

## 2024-08-04 NOTE — DISCHARGE NOTE PROVIDER - CARE PROVIDERS DIRECT ADDRESSES
[email or web address]

## 2024-08-05 ENCOUNTER — TRANSCRIPTION ENCOUNTER (OUTPATIENT)
Age: 64
End: 2024-08-05

## 2024-08-05 VITALS
OXYGEN SATURATION: 98 % | DIASTOLIC BLOOD PRESSURE: 61 MMHG | SYSTOLIC BLOOD PRESSURE: 116 MMHG | HEART RATE: 97 BPM | RESPIRATION RATE: 16 BRPM

## 2024-08-05 LAB
ALBUMIN SERPL ELPH-MCNC: 4 G/DL — SIGNIFICANT CHANGE UP (ref 3.3–5)
ALP SERPL-CCNC: 65 U/L — SIGNIFICANT CHANGE UP (ref 40–120)
ALT FLD-CCNC: 16 U/L — SIGNIFICANT CHANGE UP (ref 10–45)
ANION GAP SERPL CALC-SCNC: 14 MMOL/L — SIGNIFICANT CHANGE UP (ref 5–17)
APTT BLD: 32.2 SEC — SIGNIFICANT CHANGE UP (ref 24.5–35.6)
AST SERPL-CCNC: 30 U/L — SIGNIFICANT CHANGE UP (ref 10–40)
BILIRUB SERPL-MCNC: 1.4 MG/DL — HIGH (ref 0.2–1.2)
BUN SERPL-MCNC: 12 MG/DL — SIGNIFICANT CHANGE UP (ref 7–23)
CALCIUM SERPL-MCNC: 8.7 MG/DL — SIGNIFICANT CHANGE UP (ref 8.4–10.5)
CHLORIDE SERPL-SCNC: 104 MMOL/L — SIGNIFICANT CHANGE UP (ref 96–108)
CO2 SERPL-SCNC: 23 MMOL/L — SIGNIFICANT CHANGE UP (ref 22–31)
CREAT SERPL-MCNC: 0.82 MG/DL — SIGNIFICANT CHANGE UP (ref 0.5–1.3)
EGFR: 99 ML/MIN/1.73M2 — SIGNIFICANT CHANGE UP
GLUCOSE BLDC GLUCOMTR-MCNC: 131 MG/DL — HIGH (ref 70–99)
GLUCOSE BLDC GLUCOMTR-MCNC: 164 MG/DL — HIGH (ref 70–99)
GLUCOSE BLDC GLUCOMTR-MCNC: 283 MG/DL — HIGH (ref 70–99)
GLUCOSE SERPL-MCNC: 154 MG/DL — HIGH (ref 70–99)
HCT VFR BLD CALC: 30.4 % — LOW (ref 39–50)
HGB BLD-MCNC: 9.5 G/DL — LOW (ref 13–17)
INR BLD: 0.91 — SIGNIFICANT CHANGE UP (ref 0.85–1.18)
MAGNESIUM SERPL-MCNC: 1.8 MG/DL — SIGNIFICANT CHANGE UP (ref 1.6–2.6)
MCHC RBC-ENTMCNC: 25.7 PG — LOW (ref 27–34)
MCHC RBC-ENTMCNC: 31.3 GM/DL — LOW (ref 32–36)
MCV RBC AUTO: 82.2 FL — SIGNIFICANT CHANGE UP (ref 80–100)
NRBC # BLD: 0 /100 WBCS — SIGNIFICANT CHANGE UP (ref 0–0)
PHOSPHATE SERPL-MCNC: 3.3 MG/DL — SIGNIFICANT CHANGE UP (ref 2.5–4.5)
PLATELET # BLD AUTO: 185 K/UL — SIGNIFICANT CHANGE UP (ref 150–400)
POTASSIUM SERPL-MCNC: 3.8 MMOL/L — SIGNIFICANT CHANGE UP (ref 3.5–5.3)
POTASSIUM SERPL-SCNC: 3.8 MMOL/L — SIGNIFICANT CHANGE UP (ref 3.5–5.3)
PROT SERPL-MCNC: 6.7 G/DL — SIGNIFICANT CHANGE UP (ref 6–8.3)
PROTHROM AB SERPL-ACNC: 10.4 SEC — SIGNIFICANT CHANGE UP (ref 9.5–13)
RBC # BLD: 3.7 M/UL — LOW (ref 4.2–5.8)
RBC # FLD: 18.5 % — HIGH (ref 10.3–14.5)
SODIUM SERPL-SCNC: 141 MMOL/L — SIGNIFICANT CHANGE UP (ref 135–145)
WBC # BLD: 5.73 K/UL — SIGNIFICANT CHANGE UP (ref 3.8–10.5)
WBC # FLD AUTO: 5.73 K/UL — SIGNIFICANT CHANGE UP (ref 3.8–10.5)

## 2024-08-05 PROCEDURE — 85025 COMPLETE CBC W/AUTO DIFF WBC: CPT

## 2024-08-05 PROCEDURE — 85027 COMPLETE CBC AUTOMATED: CPT

## 2024-08-05 PROCEDURE — 70450 CT HEAD/BRAIN W/O DYE: CPT | Mod: MC

## 2024-08-05 PROCEDURE — 83036 HEMOGLOBIN GLYCOSYLATED A1C: CPT

## 2024-08-05 PROCEDURE — P9016: CPT

## 2024-08-05 PROCEDURE — 84295 ASSAY OF SERUM SODIUM: CPT

## 2024-08-05 PROCEDURE — 83880 ASSAY OF NATRIURETIC PEPTIDE: CPT

## 2024-08-05 PROCEDURE — 84443 ASSAY THYROID STIM HORMONE: CPT

## 2024-08-05 PROCEDURE — 81003 URINALYSIS AUTO W/O SCOPE: CPT

## 2024-08-05 PROCEDURE — 82962 GLUCOSE BLOOD TEST: CPT

## 2024-08-05 PROCEDURE — 82533 TOTAL CORTISOL: CPT

## 2024-08-05 PROCEDURE — 71250 CT THORAX DX C-: CPT | Mod: MC

## 2024-08-05 PROCEDURE — 82375 ASSAY CARBOXYHB QUANT: CPT

## 2024-08-05 PROCEDURE — 85018 HEMOGLOBIN: CPT

## 2024-08-05 PROCEDURE — 84100 ASSAY OF PHOSPHORUS: CPT

## 2024-08-05 PROCEDURE — 97161 PT EVAL LOW COMPLEX 20 MIN: CPT

## 2024-08-05 PROCEDURE — 82803 BLOOD GASES ANY COMBINATION: CPT

## 2024-08-05 PROCEDURE — 86850 RBC ANTIBODY SCREEN: CPT

## 2024-08-05 PROCEDURE — C8924: CPT

## 2024-08-05 PROCEDURE — 71045 X-RAY EXAM CHEST 1 VIEW: CPT | Mod: 26,59

## 2024-08-05 PROCEDURE — P9045: CPT

## 2024-08-05 PROCEDURE — 71046 X-RAY EXAM CHEST 2 VIEWS: CPT | Mod: 26

## 2024-08-05 PROCEDURE — 36415 COLL VENOUS BLD VENIPUNCTURE: CPT

## 2024-08-05 PROCEDURE — 82330 ASSAY OF CALCIUM: CPT

## 2024-08-05 PROCEDURE — 71046 X-RAY EXAM CHEST 2 VIEWS: CPT

## 2024-08-05 PROCEDURE — 80053 COMPREHEN METABOLIC PANEL: CPT

## 2024-08-05 PROCEDURE — 86901 BLOOD TYPING SEROLOGIC RH(D): CPT

## 2024-08-05 PROCEDURE — 36430 TRANSFUSION BLD/BLD COMPNT: CPT

## 2024-08-05 PROCEDURE — 71045 X-RAY EXAM CHEST 1 VIEW: CPT

## 2024-08-05 PROCEDURE — 83605 ASSAY OF LACTIC ACID: CPT

## 2024-08-05 PROCEDURE — 85610 PROTHROMBIN TIME: CPT

## 2024-08-05 PROCEDURE — 86923 COMPATIBILITY TEST ELECTRIC: CPT

## 2024-08-05 PROCEDURE — 80061 LIPID PANEL: CPT

## 2024-08-05 PROCEDURE — C1889: CPT

## 2024-08-05 PROCEDURE — 82947 ASSAY GLUCOSE BLOOD QUANT: CPT

## 2024-08-05 PROCEDURE — 84484 ASSAY OF TROPONIN QUANT: CPT

## 2024-08-05 PROCEDURE — 93005 ELECTROCARDIOGRAM TRACING: CPT

## 2024-08-05 PROCEDURE — 86891 AUTOLOGOUS BLOOD OP SALVAGE: CPT

## 2024-08-05 PROCEDURE — 84132 ASSAY OF SERUM POTASSIUM: CPT

## 2024-08-05 PROCEDURE — 99233 SBSQ HOSP IP/OBS HIGH 50: CPT | Mod: GC

## 2024-08-05 PROCEDURE — 83050 HGB METHEMOGLOBIN QUAN: CPT

## 2024-08-05 PROCEDURE — 83735 ASSAY OF MAGNESIUM: CPT

## 2024-08-05 PROCEDURE — 85730 THROMBOPLASTIN TIME PARTIAL: CPT

## 2024-08-05 PROCEDURE — 86900 BLOOD TYPING SEROLOGIC ABO: CPT

## 2024-08-05 PROCEDURE — 93306 TTE W/DOPPLER COMPLETE: CPT

## 2024-08-05 PROCEDURE — 94150 VITAL CAPACITY TEST: CPT

## 2024-08-05 PROCEDURE — 80048 BASIC METABOLIC PNL TOTAL CA: CPT

## 2024-08-05 RX ORDER — ACETAMINOPHEN 500 MG
650 TABLET ORAL EVERY 6 HOURS
Refills: 0 | Status: DISCONTINUED | OUTPATIENT
Start: 2024-08-05 | End: 2024-08-05

## 2024-08-05 RX ORDER — METOPROLOL TARTRATE 100 MG
1 TABLET ORAL
Qty: 30 | Refills: 0
Start: 2024-08-05 | End: 2024-09-03

## 2024-08-05 RX ORDER — METOPROLOL TARTRATE 100 MG
1 TABLET ORAL
Refills: 0 | DISCHARGE

## 2024-08-05 RX ORDER — INSULIN GLARGINE-YFGN 100 [IU]/ML
10 INJECTION, SOLUTION SUBCUTANEOUS
Qty: 1 | Refills: 0
Start: 2024-08-05 | End: 2024-09-03

## 2024-08-05 RX ORDER — OXYCODONE HYDROCHLORIDE 30 MG/1
1 TABLET ORAL
Qty: 20 | Refills: 0
Start: 2024-08-05 | End: 2024-08-09

## 2024-08-05 RX ORDER — MAGNESIUM OXIDE 253 MG/1
400 TABLET ORAL ONCE
Refills: 0 | Status: COMPLETED | OUTPATIENT
Start: 2024-08-05 | End: 2024-08-05

## 2024-08-05 RX ORDER — VALSARTAN 40 MG/1
1 TABLET, FILM COATED ORAL
Qty: 60 | Refills: 0
Start: 2024-08-05 | End: 2024-09-03

## 2024-08-05 RX ORDER — SENNOSIDES 8.6 MG/1
2 TABLET ORAL
Qty: 60 | Refills: 0
Start: 2024-08-05 | End: 2024-09-03

## 2024-08-05 RX ORDER — POTASSIUM CHLORIDE 1500 MG/1
40 TABLET, EXTENDED RELEASE ORAL ONCE
Refills: 0 | Status: COMPLETED | OUTPATIENT
Start: 2024-08-05 | End: 2024-08-05

## 2024-08-05 RX ORDER — PANTOPRAZOLE SODIUM 20 MG/1
1 TABLET, DELAYED RELEASE ORAL
Qty: 30 | Refills: 0
Start: 2024-08-05 | End: 2024-09-03

## 2024-08-05 RX ORDER — ATORVASTATIN CALCIUM 40 MG/1
1 TABLET, FILM COATED ORAL
Qty: 30 | Refills: 0
Start: 2024-08-05 | End: 2024-09-03

## 2024-08-05 RX ORDER — VALSARTAN 40 MG/1
40 TABLET, FILM COATED ORAL EVERY 12 HOURS
Refills: 0 | Status: DISCONTINUED | OUTPATIENT
Start: 2024-08-05 | End: 2024-08-05

## 2024-08-05 RX ORDER — LORATADINE 10 MG
17 TABLET,DISINTEGRATING ORAL
Qty: 510 | Refills: 0
Start: 2024-08-05 | End: 2024-09-03

## 2024-08-05 RX ORDER — OXYCODONE HYDROCHLORIDE 30 MG/1
5 TABLET ORAL EVERY 6 HOURS
Refills: 0 | Status: DISCONTINUED | OUTPATIENT
Start: 2024-08-05 | End: 2024-08-05

## 2024-08-05 RX ORDER — EMPAGLIFLOZIN 25 MG/1
1 TABLET, FILM COATED ORAL
Qty: 30 | Refills: 0
Start: 2024-08-05 | End: 2024-09-03

## 2024-08-05 RX ORDER — METOPROLOL TARTRATE 100 MG
25 TABLET ORAL DAILY
Refills: 0 | Status: DISCONTINUED | OUTPATIENT
Start: 2024-08-05 | End: 2024-08-05

## 2024-08-05 RX ORDER — CLOPIDOGREL BISULFATE 75 MG/1
1 TABLET, FILM COATED ORAL
Qty: 30 | Refills: 0
Start: 2024-08-05 | End: 2024-09-03

## 2024-08-05 RX ORDER — INSULIN LISPRO 100/ML
1 VIAL (ML) SUBCUTANEOUS
Qty: 1 | Refills: 0
Start: 2024-08-05 | End: 2024-09-03

## 2024-08-05 RX ORDER — FERROUS SULFATE 325(65) MG
1 TABLET ORAL
Qty: 30 | Refills: 0
Start: 2024-08-05 | End: 2024-09-03

## 2024-08-05 RX ORDER — EMPAGLIFLOZIN 25 MG/1
1 TABLET, FILM COATED ORAL
Refills: 0 | DISCHARGE

## 2024-08-05 RX ORDER — ACETAMINOPHEN 500 MG
2 TABLET ORAL
Qty: 240 | Refills: 0
Start: 2024-08-05 | End: 2024-09-03

## 2024-08-05 RX ORDER — ASPIRIN 500 MG
1 TABLET ORAL
Qty: 30 | Refills: 0
Start: 2024-08-05 | End: 2024-09-03

## 2024-08-05 RX ADMIN — Medication 6: at 12:06

## 2024-08-05 RX ADMIN — MAGNESIUM OXIDE 400 MILLIGRAM(S): 253 TABLET ORAL at 07:55

## 2024-08-05 RX ADMIN — GABAPENTIN 100 MILLIGRAM(S): 400 CAPSULE ORAL at 05:45

## 2024-08-05 RX ADMIN — FLUDROCORTISONE ACETATE 0.1 MILLIGRAM(S): 0.1 TABLET ORAL at 05:46

## 2024-08-05 RX ADMIN — Medication 6 UNIT(S): at 06:19

## 2024-08-05 RX ADMIN — POTASSIUM CHLORIDE 20 MILLIEQUIVALENT(S): 1500 TABLET, EXTENDED RELEASE ORAL at 11:25

## 2024-08-05 RX ADMIN — Medication 1 PACKET(S): at 05:44

## 2024-08-05 RX ADMIN — CHLORHEXIDINE GLUCONATE 1 APPLICATION(S): 500 CLOTH TOPICAL at 05:45

## 2024-08-05 RX ADMIN — Medication 650 MILLIGRAM(S): at 15:00

## 2024-08-05 RX ADMIN — Medication 500 MILLIGRAM(S): at 05:45

## 2024-08-05 RX ADMIN — LIDOCAINE 5% 1 PATCH: 5 CREAM TOPICAL at 11:26

## 2024-08-05 RX ADMIN — CLOPIDOGREL BISULFATE 75 MILLIGRAM(S): 75 TABLET, FILM COATED ORAL at 11:25

## 2024-08-05 RX ADMIN — MUPIROCIN CALCIUM 1 APPLICATION(S): 20 CREAM TOPICAL at 05:45

## 2024-08-05 RX ADMIN — FUROSEMIDE 40 MILLIGRAM(S): 10 INJECTION, SOLUTION INTRAVENOUS at 05:45

## 2024-08-05 RX ADMIN — Medication 325 MILLIGRAM(S): at 11:25

## 2024-08-05 RX ADMIN — Medication 25 MILLIGRAM(S): at 11:25

## 2024-08-05 RX ADMIN — Medication 81 MILLIGRAM(S): at 11:25

## 2024-08-05 RX ADMIN — PANTOPRAZOLE SODIUM 40 MILLIGRAM(S): 20 TABLET, DELAYED RELEASE ORAL at 05:44

## 2024-08-05 RX ADMIN — Medication 2: at 06:19

## 2024-08-05 RX ADMIN — POTASSIUM CHLORIDE 40 MILLIEQUIVALENT(S): 1500 TABLET, EXTENDED RELEASE ORAL at 07:55

## 2024-08-05 RX ADMIN — Medication 3 MILLILITER(S): at 15:31

## 2024-08-05 RX ADMIN — HEPARIN SODIUM 5000 UNIT(S): 1000 INJECTION, SOLUTION INTRAVENOUS; SUBCUTANEOUS at 05:44

## 2024-08-05 RX ADMIN — Medication 6 UNIT(S): at 12:06

## 2024-08-05 RX ADMIN — VALSARTAN 40 MILLIGRAM(S): 40 TABLET, FILM COATED ORAL at 11:25

## 2024-08-05 RX ADMIN — Medication 3 MILLILITER(S): at 05:46

## 2024-08-05 NOTE — PROGRESS NOTE ADULT - ASSESSMENT
64M with stage C ICM (5.4cm, LVEF 30-35%), moderate cpcPH, stroke, DM2, severe 3VD now s/p elective CABG (7/31) x3 (LIMA-LAD, RA-OM, SVG-PDA).      Review:  TTE 7/29/2024: LV 5.4 cm, LVEF 30-35%, LVOT VTI 16 cm, normal RV size/function, mild AI, PASP 60 mmHg, IVC small/collapsing  [facility] 6/2024: severe 3v CAD with LM involvement  [facility] 6/2024: RA 5, PA 59/22 (35), PCWP 17, Iris CO/CI 5.1/2.8, PVR 3.6  Cardiac MRI 6/2024: LVEF 26%, RVEF 54%, subtle subendocardial LGE     #Post-cardiac surgery shock  Resolved    #Chronic systolic HF - ischemic w/ viability on MRI  -GDMT: Start 64M with stage C ICM (5.4cm, LVEF 30-35%), moderate cpcPH, stroke, DM2, severe 3VD now s/p elective CABG (7/31) x3 (LIMA-LAD, RA-OM, SVG-PDA).      Review:  TTE 7/29/2024: LV 5.4 cm, LVEF 30-35%, LVOT VTI 16 cm, normal RV size/function, mild AI, PASP 60 mmHg, IVC small/collapsing  C 6/2024: severe 3v CAD with LM involvement  [facility] 6/2024: RA 5, PA 59/22 (35), PCWP 17, Iris CO/CI 5.1/2.8, PVR 3.6  Cardiac MRI 6/2024: LVEF 26%, RVEF 54%, subtle subendocardial LGE     #Post-cardiac surgery shock  off pressors and inotropes this AM  On midodrine     #Chronic systolic HF - ischemic w/ viability on MRI  -GDMT: Start valsartan 40mg bid, jardiance 10mg qD, metoprolol succinate 25mg qD on discharge.  -Diuretics: Stop  diuretics   -Device: premature    Follow up with Dr Munroe in [facility] for GDMT titration     #cpcPH: Likely underlying lung disease in addition to heart failurev  -Diuretics: CVP 3 this AM. Stop  diuretics   -Device: premature    #cpcPH: Likely underlying lung disease in addition to heart failure

## 2024-08-05 NOTE — DISCHARGE NOTE NURSING/CASE MANAGEMENT/SOCIAL WORK - NSFLUVACAGEDISCH_IMM_ALL_CORE
Adult Progress Note - General Surgery   Charlette Welch 63 y o  female MRN: [id number]  Unit/Bed#: ED 10 Encounter: [id number]    Assessment:  Incomplete small bowel obstruction with decreasing pain  Obstruction most likely secondary to adhesions  Ventral hernia reducible  Peripheral edema  History of lupus and Sjogren's disease  Plan:  Continue with NPO and IV fluids and serial labs  Subjective/Objective       Subjective: The patient complains of some mid abdominal pain but states it is slowly improved  She has no nausea or vomiting  Objective:     Blood pressure 90/52, pulse 90, temperature 99 3 °F (37 4 °C), temperature source Oral, resp  rate 20, height 5' 3" (1 6 m), weight 67 1 kg (148 lb), SpO2 95 %  ,Body mass index is 26 22 kg/m²  Intake/Output Summary (Last 24 hours) at 10/15/2022 0852  Last data filed at 10/15/2022 0420  Gross per 24 hour   Intake 272 15 ml   Output --   Net 272 15 ml       Invasive Devices  Report    Peripheral Intravenous Line  Duration           Peripheral IV 10/14/22 Left;Proximal;Ventral (anterior) Forearm <1 day    Peripheral IV 10/14/22 Right Forearm <1 day                Physical Exam:  The patient is awake and alert  Abdomen is soft with tenderness at the mid abdomen and periumbilical area  There is a reducible hernia in the infraumbilical region  This is slightly tender      Lab, Imaging and other studies:  CBC:   Lab Results   Component Value Date    WBC 12 49 (H) 10/15/2022    HGB 10 9 (L) 10/15/2022    HCT 33 4 (L) 10/15/2022    MCV 92 10/15/2022     10/15/2022    MCH 29 9 10/15/2022    MCHC 32 6 10/15/2022    RDW 15 7 (H) 10/15/2022    MPV 9 5 10/15/2022    NRBC 0 10/15/2022   , CMP:   Lab Results   Component Value Date    SODIUM 136 10/15/2022    K 3 9 10/15/2022     10/15/2022    CO2 32 10/15/2022    BUN 13 10/15/2022    CREATININE 0 73 10/15/2022    CALCIUM 8 1 (L) 10/15/2022    AST 17 10/14/2022    ALT 19 10/14/2022    ALKPHOS 97 10/14/2022 EGFR 89 10/15/2022     VTE Pharmacologic Prophylaxis: Heparin

## 2024-08-05 NOTE — PROGRESS NOTE ADULT - ATTENDING COMMENTS
65 YO M with a history of ACC/AHA Stage C ICM (LV 5.4 cm, LVEF 30-35%), moderate cpcPH, prior CVA, well controlled DM2, and known severe 3v CAD who presents for elective CABG. He was transferred from [facility] 6/2024 for surgical evaluation where plan was to pursue as outpatient and since then has tolerated GDMT with improvement in BNP levels and NYHA functional class. He is now s/p 3v CABG by Dr. Ardon on 7/31 and recovering well. His BPs were low requiring midodrine and now improved, will plan for d/c on lower doses of meds with close HF followup    REVIEW OF STUDIES  TTE 7/29/2024: LV 5.4 cm, LVEF 30-35%, LVOT VTI 16 cm, normal RV size/function, mild AI, PASP 60 mmHg, IVC small/collapsing  [facility] 6/2024: severe 3v CAD with LM involvement  [facility] 6/2024: RA 5, PA 59/22 (35), PCWP 17, Iris CO/CI 5.1/2.8, PVR 3.6  Cardiac MRI 6/2024: LVEF 26%, RVEF 54%, subtle subendocardial LGE     PLAN    # Chronic systolic heart failure  - Etiology: ischemic, now s/p revascularization  - GDMT: start valsartan 40 mg BID in lieu of home Entresto. resume metoprolol succinate at lower dose 25 mg daily. resume home Jardiance 10 mg daily on discharge   - Diuretics: euvolemic off diuretics, stop standing furosemide   - Device: premature     # CAD  - now s/p CABG  - on DAPT per CTS  - continue statin     Email sent to [facility] HF team to arrange HF followup at 7-10 days.

## 2024-08-05 NOTE — DISCHARGE NOTE NURSING/CASE MANAGEMENT/SOCIAL WORK - NSDCPEFALRISK_GEN_ALL_CORE
For information on Fall & Injury Prevention, visit: [email or web address]  https://www.cdc.gov/steadi/patient.html

## 2024-08-05 NOTE — PROGRESS NOTE ADULT - SUBJECTIVE AND OBJECTIVE BOX
INTERVAL EVENTS: No o/n events. Denies CP, dyspnea, palpitations, presyncope, syncope, f/c/n/v.     REVIEW OF SYSTEMS:  10 systems reviewed and negative unless otherwise stated.      PHYSICAL EXAM:  GENERAL: No acute distress  ENT: JVP cmH2O  CHEST/LUNG: Clear to auscultation bilaterally  HEART:  No murmurs, S1+s2   ABDOMEN: Soft, Nontender  EXTREMITIES: Warm and well perfused. No edema  NEUROLOGY: AAOx3    LABS:                        9.5    5.73  )-----------( 185      ( 05 Aug 2024 05:28 )             30.4     08-05    141  |  104  |  12  ----------------------------<  154<H>  3.8   |  23  |  0.82    Ca    8.7      05 Aug 2024 05:28  Phos  3.3     08-05  Mg     1.8     08-05    TPro  6.7  /  Alb  4.0  /  TBili  1.4<H>  /  DBili  x   /  AST  30  /  ALT  16  /  AlkPhos  65  08-05      Radiographic Imaging, ECG, echocardiography personally reviewed.           INTERVAL EVENTS: No o/n events. Denies CP, dyspnea, lying comfortably in bed    PHYSICAL EXAM:  GENERAL: No acute distress  ENT: JVP 5 cmH2O  CHEST/LUNG: Clear to auscultation bilaterally  HEART:  No murmurs, S1+s2   ABDOMEN: Soft, Nontender  EXTREMITIES: Warm and well perfused. No edema  NEUROLOGY: AAOx3    LABS:                        9.5    5.73  )-----------( 185      ( 05 Aug 2024 05:28 )             30.4     08-05    141  |  104  |  12  ----------------------------<  154<H>  3.8   |  23  |  0.82    Ca    8.7      05 Aug 2024 05:28  Phos  3.3     08-05  Mg     1.8     08-05    TPro  6.7  /  Alb  4.0  /  TBili  1.4<H>  /  DBili  x   /  AST  30  /  ALT  16  /  AlkPhos  65  08-05      Radiographic Imaging, ECG, echocardiography personally reviewed.

## 2024-08-05 NOTE — DISCHARGE NOTE NURSING/CASE MANAGEMENT/SOCIAL WORK - PATIENT PORTAL LINK FT
You can access the FollowMyHealth Patient Portal offered by [facility] by registering at the following website: [email or web address]. By joining [employer]’s FollowMyHealth portal, you will also be able to view your health information using other applications (apps) compatible with our system.

## 2024-08-05 NOTE — PROGRESS NOTE ADULT - PROVIDER SPECIALTY LIST ADULT
CT Surgery
Critical Care
Critical Care
Heart Failure
CT Surgery
Critical Care
Heart Failure
Heart Failure

## 2024-08-05 NOTE — CHART NOTE - NSCHARTNOTEFT_GEN_A_CORE
Pt exhibiting intrinsic heart rate and rhythm.  Per Dr. Yañez  pacing wires removed at bedside.  No acute issues.  Pt tolerated the procedure well.

## 2024-08-06 ENCOUNTER — NON-APPOINTMENT (OUTPATIENT)
Age: 64
End: 2024-08-06

## 2024-08-07 ENCOUNTER — APPOINTMENT (OUTPATIENT)
Dept: CARE COORDINATION | Facility: HOME HEALTH | Age: 64
End: 2024-08-07

## 2024-08-07 PROCEDURE — 99024 POSTOP FOLLOW-UP VISIT: CPT

## 2024-08-07 NOTE — HISTORY OF PRESENT ILLNESS
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 He was referred to Dr. Ardon for surgical evaluation and presented to [facility] on  for heart failure optimization and pre-surgical testing. HF team consulted and following. On 24 he underwent an uncomplicated OPCABx3 (LIMA to LAD, RA to OM, SVG to PDA) EF 30% with Dr. Ardon. He arrived to the CTICU intubated, on levo gtt.  started per heart failure team. Pt extubated that evening. POD1  weaned. 1u pRBC, midodrine started. POD2 Levo weaned off and gurwinder removed. Florinef started for low cortisol. POD 4 Midodrine weaned off. POD5 Pacing wires removed. CXR pa lateral preformed. Patient feels well, has no complaints. Tolerating PO diet, satting well on room air, ambulating independently. Pr Dr Ardon pt medically ready for discharge home. Per Heart failure team pt should start valsartan 40mg BID, metoprolol succinate 25mg daily and home dose of Jardiance, and no Lasix, and is cleared for discharge home today from their perspective. Per pt he takes Lantus 10u daily, Humolog sliding scale and Jardiance 25 daily. Mr. Steinberg is recovering at home with his family, He ambulates independently.  Pt verbalized a good appetite and is sleeping well at night.  He denies dizziness, SOB, palpitations, abdominal pain, and constipation. He does verbalize some left LE swelling.  Blood glucose 114 this morning fasting. N[facility] to be initiated.  [Diabetes Mellitus] : Diabetes Mellitus [Diet] : controlled with diet [Insulin] : controlled with insulin [Dyslipidemia] : Dyslipidemia [Hypertension] : Hypertension

## 2024-08-07 NOTE — ASSESSMENT
[FreeTextEntry1] : S/p CABG x3- continue Asa, Atorvastatin, Jardiance, Metoprolol Tartrate, and Valsartan. DM- continue Lantus and Humalog insulin with blood sugars before meals, and consistent carb diet.

## 2024-08-07 NOTE — PLAN
[TextEntry] : Post Operative Care:  Pt advised of importance of daily weights. Pt advised to call [facility] NP with any weight gain of 3 lbs or more.   Pt instructed on how to use incentive spirometer every hour, demonstrated proper use.  Pt encouraged to ambulate as much as tolerated, avoiding extreme temperatures outdoors.  Also advised to cleanse incisions daily with mild soap and water and to avoid lotions, powders, ointments or creams near or on the incision.  Low salt, low fat diet encouraged and discussed.  Pt advised to avoid heavy lifting or straining.     Follow Your Heart team will continue to follow up with pt status.  NP/CCC roles explained with pt understanding, contact information provided. Pt agrees to call with any questions, issues or concerns.  Worsening symptoms reviewed with patient understanding.      FOLLOW UP APPOINTMENTS:  CTS: Dr. Ardon appointment 8/13/24   CARDIOLOGIST: Dr. Monte appointment in 2 weeks  Heart Failure: Dr. Cueva appointment 8/26/24  PCP: Pt encouraged to follow up within one month of discharge

## 2024-08-07 NOTE — PHYSICAL EXAM
[Sclera] : the sclera and conjunctiva were normal [PERRL With Normal Accommodation] : pupils were equal in size, round, and reactive to light [Neck Appearance] : the appearance of the neck was normal [Respiration, Rhythm And Depth] : normal respiratory rhythm and effort [Exaggerated Use Of Accessory Muscles For Inspiration] : no accessory muscle use [Auscultation Breath Sounds / Voice Sounds] : lungs were clear to auscultation bilaterally [Apical Impulse] : the apical impulse was normal [Heart Rate And Rhythm] : heart rate was normal and rhythm regular [Heart Sounds] : normal S1 and S2 [Heart Sounds Gallop] : no gallops [Murmurs] : no murmurs [Heart Sounds Pericardial Friction Rub] : no pericardial rub [Examination Of The Chest] : the chest was normal in appearance [Chest Visual Inspection Thoracic Asymmetry] : no chest asymmetry [Diminished Respiratory Excursion] : normal chest expansion [2+] : left 2+ [Breast Appearance] : normal in appearance [Bowel Sounds] : normal bowel sounds [Abdomen Soft] : soft [Abdomen Tenderness] : non-tender [Abnormal Walk] : normal gait [Nail Clubbing] : no clubbing  or cyanosis of the fingernails [Involuntary Movements] : no involuntary movements were seen [Skin Color & Pigmentation] : normal skin color and pigmentation [Skin Turgor] : normal skin turgor [] : no rash [FreeTextEntry1] : CT sites clean, dry, and intact. L radial site clean, dry, and intact. Bruising noted to left thigh. L SVG clean, dry, and intact. L ankle with trace edema. [No Focal Deficits] : no focal deficits [Oriented To Time, Place, And Person] : oriented to person, place, and time [Impaired Insight] : insight and judgment were intact [Affect] : the affect was normal [Mood] : the mood was normal [Memory Recent] : recent memory was not impaired [Memory Remote] : remote memory was not impaired

## 2024-08-08 PROBLEM — Z95.1 S/P CABG X 3: Status: ACTIVE | Noted: 2024-08-02

## 2024-08-08 PROBLEM — Z09 POSTOP CHECK: Status: ACTIVE | Noted: 2024-08-02

## 2024-08-09 ENCOUNTER — TRANSCRIPTION ENCOUNTER (OUTPATIENT)
Age: 64
End: 2024-08-09

## 2024-08-09 DIAGNOSIS — Z79.84 LONG TERM (CURRENT) USE OF ORAL HYPOGLYCEMIC DRUGS: ICD-10-CM

## 2024-08-09 DIAGNOSIS — Z86.16 PERSONAL HISTORY OF COVID-19: ICD-10-CM

## 2024-08-09 DIAGNOSIS — D69.6 THROMBOCYTOPENIA, UNSPECIFIED: ICD-10-CM

## 2024-08-09 DIAGNOSIS — I27.20 PULMONARY HYPERTENSION, UNSPECIFIED: ICD-10-CM

## 2024-08-09 DIAGNOSIS — Z79.02 LONG TERM (CURRENT) USE OF ANTITHROMBOTICS/ANTIPLATELETS: ICD-10-CM

## 2024-08-09 DIAGNOSIS — E78.5 HYPERLIPIDEMIA, UNSPECIFIED: ICD-10-CM

## 2024-08-09 DIAGNOSIS — I25.10 ATHEROSCLEROTIC HEART DISEASE OF NATIVE CORONARY ARTERY WITHOUT ANGINA PECTORIS: ICD-10-CM

## 2024-08-09 DIAGNOSIS — Z86.73 PERSONAL HISTORY OF TRANSIENT ISCHEMIC ATTACK (TIA), AND CEREBRAL INFARCTION WITHOUT RESIDUAL DEFICITS: ICD-10-CM

## 2024-08-09 DIAGNOSIS — J95.1 ACUTE PULMONARY INSUFFICIENCY FOLLOWING THORACIC SURGERY: ICD-10-CM

## 2024-08-09 DIAGNOSIS — I11.0 HYPERTENSIVE HEART DISEASE WITH HEART FAILURE: ICD-10-CM

## 2024-08-09 DIAGNOSIS — E11.9 TYPE 2 DIABETES MELLITUS WITHOUT COMPLICATIONS: ICD-10-CM

## 2024-08-09 DIAGNOSIS — Z79.4 LONG TERM (CURRENT) USE OF INSULIN: ICD-10-CM

## 2024-08-09 DIAGNOSIS — Z79.82 LONG TERM (CURRENT) USE OF ASPIRIN: ICD-10-CM

## 2024-08-09 DIAGNOSIS — H54.42A3 BLINDNESS LEFT EYE CATEGORY 3, NORMAL VISION RIGHT EYE: ICD-10-CM

## 2024-08-09 DIAGNOSIS — R57.8 OTHER SHOCK: ICD-10-CM

## 2024-08-09 DIAGNOSIS — D62 ACUTE POSTHEMORRHAGIC ANEMIA: ICD-10-CM

## 2024-08-09 DIAGNOSIS — I50.41 ACUTE COMBINED SYSTOLIC (CONGESTIVE) AND DIASTOLIC (CONGESTIVE) HEART FAILURE: ICD-10-CM

## 2024-08-09 DIAGNOSIS — Z86.74 PERSONAL HISTORY OF SUDDEN CARDIAC ARREST: ICD-10-CM

## 2024-08-10 ENCOUNTER — TRANSCRIPTION ENCOUNTER (OUTPATIENT)
Age: 64
End: 2024-08-10

## 2024-08-11 NOTE — PATIENT PROFILE ADULT - VISION (WITH CORRECTIVE LENSES IF THE PATIENT USUALLY WEARS THEM):
Normal vision: sees adequately in most situations; can see medication labels, newsprint
(1) Oriented to own ability

## 2024-08-13 ENCOUNTER — APPOINTMENT (OUTPATIENT)
Dept: CARDIOTHORACIC SURGERY | Facility: CLINIC | Age: 64
End: 2024-08-13
Payer: MEDICAID

## 2024-08-13 PROCEDURE — 99024 POSTOP FOLLOW-UP VISIT: CPT

## 2024-08-14 ENCOUNTER — TRANSCRIPTION ENCOUNTER (OUTPATIENT)
Age: 64
End: 2024-08-14

## 2024-08-14 NOTE — REASON FOR VISIT
[Home] : at home, [unfilled] , at the time of the visit. [Medical Office: ([facility])___] : at the medical office located in  [Spouse] : spouse [Patient] : the patient [Self] : self [de-identified] : OPCAB x 3, EF 30% [de-identified] : 7/31/24 [de-identified] : Intraop uncomplicated. To CTICU intubated on on Levo. Dobut started. Extubated on POD 0. Postop course managed by heart failure team. Patient discharged home on 8/5.  Patient is seen today via [facility] health. He appears well, NAD.  Chest xray today wnl, no effusion

## 2024-08-14 NOTE — END OF VISIT
[FreeTextEntry3] : I, Dr. VELASQUEZ [facility], personally performed the evaluation and management (E/M) services for this established patient who presents today with (a) new problem(s)/exacerbation of (an) existing condition(s).  That E/M includes conducting the clinically appropriate interval history &/or exam, assessing all new/exacerbated conditions, and establishing a new plan of care.  Today, my PRIETO, was here to observe &/or participate in the visit & follow plan of care established by me.

## 2024-08-14 NOTE — REASON FOR VISIT
[Home] : at home, [unfilled] , at the time of the visit. [Medical Office: ([facility])___] : at the medical office located in  [Spouse] : spouse [Patient] : the patient [Self] : self [de-identified] : OPCAB x 3, EF 30% [de-identified] : 7/31/24 [de-identified] : Intraop uncomplicated. To CTICU intubated on on Levo. Dobut started. Extubated on POD 0. Postop course managed by heart failure team. Patient discharged home on 8/5.  Patient is seen today via [facility] health. He appears well, NAD.  Chest xray today wnl, no effusion

## 2024-08-14 NOTE — DISCUSSION/SUMMARY
[Doing Well] : is doing well [1] : 1 [FreeTextEntry1] : Plan:  Continue current medication regimen. Continue to increase activity and walk daily as tolerated. Continue to use incentive spirometer.  No driving or strenuous activity for 4 weeks after surgery. Avoid lifting >10 to 15 lbs. Call MD if you experience fever, fatigue, dizziness, confusion, syncope, shortness of breath, chest pain not relieved with analgesics, increased redness/drainage from incision. Follow up with Dr. Hammad goodwin GDMT DC from CTS service

## 2024-08-14 NOTE — END OF VISIT
[FreeTextEntry3] : I, Dr. VELASQUEZ [facility], personally performed the evaluation and management (E/M) services for this established patient who presents today with (a) new problem(s)/exacerbation of (an) existing condition(s).  That E/M includes conducting the clinically appropriate interval history &/or exam, assessing all new/exacerbated conditions, and establishing a new plan of care.  Today, my PRIETO, was here to observe &/or participate in the visit & follow plan of care established by me.

## 2024-08-15 ENCOUNTER — TRANSCRIPTION ENCOUNTER (OUTPATIENT)
Age: 64
End: 2024-08-15

## 2024-08-16 ENCOUNTER — APPOINTMENT (OUTPATIENT)
Dept: CARE COORDINATION | Facility: HOME HEALTH | Age: 64
End: 2024-08-16
Payer: MEDICAID

## 2024-08-16 ENCOUNTER — APPOINTMENT (OUTPATIENT)
Dept: HEART AND VASCULAR | Facility: CLINIC | Age: 64
End: 2024-08-16

## 2024-08-16 VITALS
RESPIRATION RATE: 14 BRPM | DIASTOLIC BLOOD PRESSURE: 54 MMHG | BODY MASS INDEX: 21.59 KG/M2 | WEIGHT: 142 LBS | OXYGEN SATURATION: 99 % | HEART RATE: 92 BPM | SYSTOLIC BLOOD PRESSURE: 122 MMHG

## 2024-08-16 DIAGNOSIS — E11.9 TYPE 2 DIABETES MELLITUS W/OUT COMPLICATIONS: ICD-10-CM

## 2024-08-16 DIAGNOSIS — I63.9 CEREBRAL INFARCTION, UNSPECIFIED: ICD-10-CM

## 2024-08-16 DIAGNOSIS — Z79.4 TYPE 2 DIABETES MELLITUS W/OUT COMPLICATIONS: ICD-10-CM

## 2024-08-16 DIAGNOSIS — Z09 ENCOUNTER FOR FOLLOW-UP EXAMINATION AFTER COMPLETED TREATMENT FOR CONDITIONS OTHER THAN MALIGNANT NEOPLASM: ICD-10-CM

## 2024-08-16 DIAGNOSIS — Z95.1 PRESENCE OF AORTOCORONARY BYPASS GRAFT: ICD-10-CM

## 2024-08-16 DIAGNOSIS — I25.10 ATHEROSCLEROTIC HEART DISEASE OF NATIVE CORONARY ARTERY W/OUT ANGINA PECTORIS: ICD-10-CM

## 2024-08-16 DIAGNOSIS — I50.20 UNSPECIFIED SYSTOLIC (CONGESTIVE) HEART FAILURE: ICD-10-CM

## 2024-08-16 DIAGNOSIS — E78.5 HYPERLIPIDEMIA, UNSPECIFIED: ICD-10-CM

## 2024-08-16 PROCEDURE — 99024 POSTOP FOLLOW-UP VISIT: CPT

## 2024-08-16 NOTE — PLAN
[TextEntry] : Post Operative Care:  Pt advised of importance of daily weights. Pt instructed on how to use incentive spirometer every hour, demonstrated proper use. Pt encouraged to ambulate as much as tolerated, avoiding extreme temperatures outdoors. Also advised to cleanse incisions daily with mild soap and water and to avoid lotions, powders, ointments or creams near or on the incision. Low salt, low fat diet encouraged and discussed. Pt advised to avoid heavy lifting or straining.     Follow Your Heart team will continue to follow up with pt status.  NP/CCC roles explained with pt understanding, contact information provided. Pt agrees to call with any questions, issues or concerns.  Worsening symptoms reviewed with patient understanding.      FOLLOW UP APPOINTMENTS:  CTS: Dr. Ardon appt 8/13/24  CARDIOLOGIST: Dr. Monte appt 9/6/24  Heart Failure Dr. Cueva appt 8/29/24  PCP: Pt encouraged to follow up within one month of discharge

## 2024-08-16 NOTE — PHYSICAL EXAM
[Sclera] : the sclera and conjunctiva were normal [PERRL With Normal Accommodation] : pupils were equal in size, round, and reactive to light [Neck Appearance] : the appearance of the neck was normal [Respiration, Rhythm And Depth] : normal respiratory rhythm and effort [Exaggerated Use Of Accessory Muscles For Inspiration] : no accessory muscle use [Auscultation Breath Sounds / Voice Sounds] : lungs were clear to auscultation bilaterally [Apical Impulse] : the apical impulse was normal [Heart Rate And Rhythm] : heart rate was normal and rhythm regular [Heart Sounds] : normal S1 and S2 [Heart Sounds Gallop] : no gallops [Murmurs] : no murmurs [Heart Sounds Pericardial Friction Rub] : no pericardial rub [Examination Of The Chest] : the chest was normal in appearance [Chest Visual Inspection Thoracic Asymmetry] : no chest asymmetry [Diminished Respiratory Excursion] : normal chest expansion [2+] : left 2+ [Breast Appearance] : normal in appearance [Bowel Sounds] : normal bowel sounds [Abdomen Soft] : soft [Abdomen Tenderness] : non-tender [Abnormal Walk] : normal gait [Nail Clubbing] : no clubbing  or cyanosis of the fingernails [Involuntary Movements] : no involuntary movements were seen [Skin Color & Pigmentation] : normal skin color and pigmentation [Skin Turgor] : normal skin turgor [] : no rash [FreeTextEntry1] : CT sites clean, dry, and intact. L radial site clean, dry, and intact. L SVG site clean, dry, and intact. No LE edema on exam. [No Focal Deficits] : no focal deficits [Oriented To Time, Place, And Person] : oriented to person, place, and time [Impaired Insight] : insight and judgment were intact [Affect] : the affect was normal [Mood] : the mood was normal [Memory Recent] : recent memory was not impaired [Memory Remote] : remote memory was not impaired

## 2024-08-16 NOTE — HISTORY OF PRESENT ILLNESS
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 He was referred to Dr. Ardon for surgical evaluation and presented to [facility] on  for heart failure optimization and pre-surgical testing. HF team consulted and following. On 24 he underwent an uncomplicated OPCABx3 (LIMA to LAD, RA to OM, SVG to PDA) EF 30% with Dr. Ardon. He arrived to the CTICU intubated, on levo gtt.  started per heart failure team. Pt extubated that evening. POD1  weaned. 1u pRBC, midodrine started. POD2 Levo weaned off and gurwinder removed. Florinef started for low cortisol. POD 4 Midodrine weaned off. POD5 Pacing wires removed. CXR pa lateral preformed. Patient feels well, has no complaints. Tolerating PO diet, satting well on room air, ambulating independently. Pr Dr Ardon pt medically ready for discharge home.  Mr. Steinberg is recovering at home with his family. He is ambulating independently.  Pt denies dizziness, SOB, palpitations, abdominal ain, constipation, difficulty urinating and LE swelling.  Pt c/o low blood pressure to the 80's systolic, and he has been holding his Metoprolol Succinate 12.5 mg daily and Valsartan 20 mg po bid.  Home visit to check BP and assess patient. Fasting blood glucose 96 this am.  [Diabetes Mellitus] : Diabetes Mellitus [Diet] : controlled with diet [Insulin] : controlled with insulin [Dyslipidemia] : Dyslipidemia [Hypertension] : Hypertension [Prior CVA] : with prior CVA  [Remote (>= 2 wks)] : in remote past, greater than 2 wks ago

## 2024-08-16 NOTE — ASSESSMENT
[FreeTextEntry1] : S/p CABG x3- continue Asa, Atorvastatin, Metoprolol Succinate 12.5 mg qd ( given to pt at visit) and Valsartan 20 mg po bid if SBP greater than 110.

## 2024-08-20 ENCOUNTER — TRANSCRIPTION ENCOUNTER (OUTPATIENT)
Age: 64
End: 2024-08-20

## 2024-08-21 ENCOUNTER — APPOINTMENT (OUTPATIENT)
Dept: HEART FAILURE | Facility: CLINIC | Age: 64
End: 2024-08-21

## 2024-08-21 ENCOUNTER — NON-APPOINTMENT (OUTPATIENT)
Age: 64
End: 2024-08-21

## 2024-08-21 ENCOUNTER — LABORATORY RESULT (OUTPATIENT)
Age: 64
End: 2024-08-21

## 2024-08-21 VITALS
DIASTOLIC BLOOD PRESSURE: 64 MMHG | OXYGEN SATURATION: 99 % | HEIGHT: 68 IN | TEMPERATURE: 97.2 F | SYSTOLIC BLOOD PRESSURE: 126 MMHG | WEIGHT: 137 LBS | BODY MASS INDEX: 20.76 KG/M2 | HEART RATE: 94 BPM

## 2024-08-21 PROCEDURE — 99213 OFFICE O/P EST LOW 20 MIN: CPT

## 2024-08-22 ENCOUNTER — TRANSCRIPTION ENCOUNTER (OUTPATIENT)
Age: 64
End: 2024-08-22

## 2024-08-22 LAB
ALBUMIN SERPL ELPH-MCNC: 4.2 G/DL
ALP BLD-CCNC: 97 U/L
ALT SERPL-CCNC: 14 U/L
ANION GAP SERPL CALC-SCNC: 15 MMOL/L
AST SERPL-CCNC: 21 U/L
BILIRUB SERPL-MCNC: 0.5 MG/DL
BUN SERPL-MCNC: 19 MG/DL
CALCIUM SERPL-MCNC: 8.8 MG/DL
CHLORIDE SERPL-SCNC: 101 MMOL/L
CO2 SERPL-SCNC: 22 MMOL/L
CREAT SERPL-MCNC: 1 MG/DL
EGFR: 85 ML/MIN/1.73M2
GLUCOSE SERPL-MCNC: 191 MG/DL
NT-PROBNP SERPL-MCNC: 1575 PG/ML
POTASSIUM SERPL-SCNC: 4.4 MMOL/L
PROT SERPL-MCNC: 6.5 G/DL
SODIUM SERPL-SCNC: 138 MMOL/L

## 2024-08-23 LAB
BASOPHILS # BLD AUTO: 0.01 K/UL
BASOPHILS NFR BLD AUTO: 0.3 %
EOSINOPHIL # BLD AUTO: 0.04 K/UL
EOSINOPHIL NFR BLD AUTO: 1.2 %
HCT VFR BLD CALC: 37.4 %
HGB BLD-MCNC: 11.2 G/DL
IMM GRANULOCYTES NFR BLD AUTO: 0.3 %
LYMPHOCYTES # BLD AUTO: 0.6 K/UL
LYMPHOCYTES NFR BLD AUTO: 17.9 %
MAN DIFF?: NORMAL
MCHC RBC-ENTMCNC: 26.1 PG
MCHC RBC-ENTMCNC: 29.9 GM/DL
MCV RBC AUTO: 87.2 FL
MONOCYTES # BLD AUTO: 0.38 K/UL
MONOCYTES NFR BLD AUTO: 11.3 %
NEUTROPHILS # BLD AUTO: 2.32 K/UL
NEUTROPHILS NFR BLD AUTO: 69 %
PLATELET # BLD AUTO: 295 K/UL
RBC # BLD: 4.29 M/UL
RBC # FLD: 20.5 %
WBC # FLD AUTO: 3.36 K/UL

## 2024-08-26 ENCOUNTER — APPOINTMENT (OUTPATIENT)
Dept: HEART FAILURE | Facility: CLINIC | Age: 64
End: 2024-08-26

## 2024-08-26 ENCOUNTER — TRANSCRIPTION ENCOUNTER (OUTPATIENT)
Age: 64
End: 2024-08-26

## 2024-08-27 ENCOUNTER — TRANSCRIPTION ENCOUNTER (OUTPATIENT)
Age: 64
End: 2024-08-27

## 2024-08-28 ENCOUNTER — NON-APPOINTMENT (OUTPATIENT)
Age: 64
End: 2024-08-28

## 2024-08-28 ENCOUNTER — TRANSCRIPTION ENCOUNTER (OUTPATIENT)
Age: 64
End: 2024-08-28

## 2024-08-29 ENCOUNTER — APPOINTMENT (OUTPATIENT)
Dept: HEART FAILURE | Facility: CLINIC | Age: 64
End: 2024-08-29
Payer: MEDICAID

## 2024-08-29 VITALS
BODY MASS INDEX: 21.37 KG/M2 | DIASTOLIC BLOOD PRESSURE: 68 MMHG | HEART RATE: 81 BPM | SYSTOLIC BLOOD PRESSURE: 119 MMHG | OXYGEN SATURATION: 100 % | HEIGHT: 68 IN | WEIGHT: 141 LBS

## 2024-08-29 PROCEDURE — 99214 OFFICE O/P EST MOD 30 MIN: CPT

## 2024-08-29 NOTE — ASSESSMENT
[FreeTextEntry1] : 65 yo man with MVCAD and HFrEF, prior CVA and cardiac arrest, Left retina detachment and blindness. Pt is ACC/AHA stage C endorsing NYHA CLass II-III symptoms. He is euvolemic and normotensive on exam. Currently tolerating GDMT with room for optimization. Plan As listed below.   ICM HFrEF  - Euvolemic on exam no need for diuretics at this time  - Continue Entresto 24-26 mg PO BID  - Increase Metoprolol XL to 50 mg PO BID  - Continue Jardiance 10 mg PO QD  - Will consider MRA in the upcoming visits.   3V CAD  - Continue Plavix 75 mg PO QD - COntinue Atorvastatin 80 mg PO QD  - Continue Aspirin 81 mg PO QD  - Continue Imdur 30 mg QD  - Plan for elective CABG with Dr. Ardon  *Tentative* RTC Aug 26th, 2024 for follow-up visit with Dr. Cueva.

## 2024-08-29 NOTE — HISTORY OF PRESENT ILLNESS
[FreeTextEntry1] : Mr. 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    He was recently admitted to [facility] for cardiac and respiratory arrest after coming back from [facility].  CT chest showed bibasilar consolidations with patchy opacities and a large pleural effusion. Pt was intubated and ultimately was diagnosed with Covid/septic shock, in the ICU during breathings trials pt had  turned head the left and right with right gave and rigidity in the extremities. CTH, showed large appearing hypodensity in right PCA territory, upon review of initial CTH by neurology team not conclusive due to streak artifact, but some early ischemic changes may have been present in right PCA territory. EEG without epileptiform activity. MRI confirms right PCA infarct without structural damage to other areas including thalamus/brainstem. PT was ultimately trached and peg'd with recovery at 3-4 weeks when everything was removed.   He then was readmitted to [facility] June 2nd for SOB, Orthopnea, LE edema, was diuresed with IV Lasix however repeat TTE showed decreased EF 25% GDMT optimized, LHC with 3V CAD. after GDMT optimization, Pt underwent CABGX 3 on 7/31 and was discharged on 8/5 on low dose BB and ARB. pt has had SBP 80-90's so his meds was stopped.    He states he has been feeling well and walks for a Hour. Denies SOB/PRECIADO. He takes his BP at home systolic 81-88 HR 70-80 and weight has been stable. His BP in office has been 110-120's. His BP cuff and office cuff correlates.  denies CP, palpitations, syncope, LH/dizziness, orthopnea, PND, abdominal discomfort, and LE edema. He has been limiting fluid and sodium in his diet and taking his medications as directed. He does not use NSAIDs.

## 2024-08-29 NOTE — HISTORY OF PRESENT ILLNESS
[FreeTextEntry1] : Mr. 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    He was recently admitted to [place] for cardiac and respiratory arrest after coming back from [facility].  CT chest showed bibasilar consolidations with patchy opacities and a large pleural effusion. Pt was intubated and ultimately was diagnosed with Covid/septic shock, in the ICU during breathings trials pt had  turned head the left and right with right gave and rigidity in the extremities. CTH, showed large appearing hypodensity in right PCA territory, upon review of initial CTH by neurology team not conclusive due to streak artifact, but some early ischemic changes may have been present in right PCA territory. EEG without epileptiform activity. MRI confirms right PCA infarct without structural damage to other areas including thalamus/brainstem. PT was ultimately trached and peg'd with recovery at 3-4 weeks when everything was removed.   He then was readmitted to [place] June 2nd for SOB, Orthopnea, LE edema> I was diuresed with IV Lasix however repeat TTE showed and even more decreased EF 25% GDMT optimization was started but pt was transferred to [place] for LHC results below. Pt was transferred to [facility] for bypass eval with Dr. Ardon. Surgery to be scheduled in the upcoming weeks. Pt presents today to establish care for his cardiomyopathy.    He states he has been feeling well since being discharged from the hospital. He walks two miles a day denies SOB/PRECIADO. He takes his BP at home systolics 110-120 HR 70-80 and weight has been stable. He denies swelling and abdominal distension. States he is staying away from salt and drinks about 2.2 L of fluid a day. He is taking his medication as instructed but he will need a refill. He has an appointment scheduled with Dr. Ardon but was told he should follow-up with HF team [facility]. He reports no change in appetite. denies CP, palpitations, syncope, LH/dizziness, orthopnea, PND, abdominal discomfort, and LE edema. He has been limiting fluid and sodium in his diet and taking his medications as directed. He does not use NSAIDs.   LHC/RHC: LVEDP: 28mmHg,  moderate calcification LM: 70% ostial stenosis. 80% distal stenosis LAD: prox: moderate diffuse disease. mid: severe diffuse disease. distal: 70% stenosis. D1: 90% ostial stenosis, medium size. D2: mild diffuse disease, medium size LCX: 95% ostial stenosis. Distal: 80% stenosis. OM1: mod/severe diffuse disease Ramus: 90% stenosis RCA: Mid RCA  with bridging collaterals. RPDA: mod diffuse disease, not well seen. RPL: large size, mild diffuse disease  RA: 5 mmHg, RV: 59/9 mmHg, PA: 59/22/35 mmHg, PCWP: 17 mmHg,  CI: 2.8

## 2024-08-29 NOTE — ASSESSMENT
[FreeTextEntry1] : 63 yo man with MVCAD and HFrEF, prior CVA and cardiac arrest, Left retina detachment and blindness. Pt is ACC/AHA stage C endorsing NYHA CLass II-III symptoms. He is euvolemic and normotensive on exam. Currently tolerating GDMT with room for optimization. Plan As listed below.   ICM HFrEF EF 30% - Euvolemic on exam no need for diuretics at this time  - recommended resuming Valsartan 20mg in AM and Metoprolol XL to 25 mg PO at bedtime. - Continue Jardiance. - Will consider MRA in the upcoming visits.   3V CAD  - Continue Aspirin and Plavix  - Continue Atorvastatin 80 mg PO QD  -sternotomy healing well. radial graft and vein graft sites healing w/o collection/hematoma.   refgerral to cardiac rehab placed and pt will start rehab at [place].  follow up 6-8weeks

## 2024-08-29 NOTE — REVIEW OF SYSTEMS
[FreeTextEntry1] : 14 point system reviewed and found negative/noncontributory other than what is noted in HPI

## 2024-08-29 NOTE — ASSESSMENT
[FreeTextEntry1] : 63 yo man with MVCAD and HFrEF, prior CVA and cardiac arrest, Left retina detachment and blindness. Pt is ACC/AHA stage C endorsing NYHA CLass II-III symptoms. He is euvolemic and normotensive on exam. Currently tolerating GDMT with room for optimization. Plan As listed below.   ICM HFrEF EF 30% - Euvolemic on exam no need for diuretics at this time  - recommended resuming Valsartan 20mg in AM and Metoprolol XL to 25 mg PO at bedtime. - Continue Jardiance. - Will consider MRA in the upcoming visits.   3V CAD  - Continue Aspirin and Plavix  - Continue Atorvastatin 80 mg PO QD  -sternotomy healing well. radial graft and vein graft sites healing w/o collection/hematoma.   refgerral to cardiac rehab placed and pt will start rehab at [place].  follow up 6-8weeks

## 2024-08-29 NOTE — HISTORY OF PRESENT ILLNESS
[FreeTextEntry1] : Mr. 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    He was recently admitted to [place] for cardiac and respiratory arrest after coming back from [facility].  CT chest showed bibasilar consolidations with patchy opacities and a large pleural effusion. Pt was intubated and ultimately was diagnosed with Covid/septic shock, in the ICU during breathings trials pt had  turned head the left and right with right gave and rigidity in the extremities. CTH, showed large appearing hypodensity in right PCA territory, upon review of initial CTH by neurology team not conclusive due to streak artifact, but some early ischemic changes may have been present in right PCA territory. EEG without epileptiform activity. MRI confirms right PCA infarct without structural damage to other areas including thalamus/brainstem. PT was ultimately trached and peg'd with recovery at 3-4 weeks when everything was removed.   He then was readmitted to [place] June 2nd for SOB, Orthopnea, LE edema, was diuresed with IV Lasix however repeat TTE showed decreased EF 25% GDMT optimized, LHC with 3V CAD. after GDMT optimization, Pt underwent CABGX 3 on 7/31 and was discharged on 8/5 on low dose BB and ARB. pt has had SBP 80-90's so his meds was stopped.    He states he has been feeling well and walks for a Hour. Denies SOB/PRECIADO. He takes his BP at home systolic 81-88 HR 70-80 and weight has been stable. His BP in office has been 110-120's. His BP cuff and office cuff correlates.  denies CP, palpitations, syncope, LH/dizziness, orthopnea, PND, abdominal discomfort, and LE edema. He has been limiting fluid and sodium in his diet and taking his medications as directed. He does not use NSAIDs.

## 2024-08-29 NOTE — HISTORY OF PRESENT ILLNESS
[FreeTextEntry1] : Mr. 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    He was recently admitted to [place] for cardiac and respiratory arrest after coming back from [facility].  CT chest showed bibasilar consolidations with patchy opacities and a large pleural effusion. Pt was intubated and ultimately was diagnosed with Covid/septic shock, in the ICU during breathings trials pt had  turned head the left and right with right gave and rigidity in the extremities. CTH, showed large appearing hypodensity in right PCA territory, upon review of initial CTH by neurology team not conclusive due to streak artifact, but some early ischemic changes may have been present in right PCA territory. EEG without epileptiform activity. MRI confirms right PCA infarct without structural damage to other areas including thalamus/brainstem. PT was ultimately trached and peg'd with recovery at 3-4 weeks when everything was removed.   He then was readmitted to [place] June 2nd for SOB, Orthopnea, LE edema> I was diuresed with IV Lasix however repeat TTE showed and even more decreased EF 25% GDMT optimization was started but pt was transferred to [place] for LHC results below. Pt was transferred to [facility] for bypass eval with Dr. Ardon. Surgery to be scheduled in the upcoming weeks. Pt presents today to establish care for his cardiomyopathy.    He states he has been feeling well since being discharged from the hospital. He walks two miles a day denies SOB/PRECIADO. He takes his BP at home systolics 110-120 HR 70-80 and weight has been stable. He denies swelling and abdominal distension. States he is staying away from salt and drinks about 2.2 L of fluid a day. He is taking his medication as instructed but he will need a refill. He has an appointment scheduled with Dr. Ardon but was told he should follow-up with HF team First. He reports no change in appetite. denies CP, palpitations, syncope, LH/dizziness, orthopnea, PND, abdominal discomfort, and LE edema. He has been limiting fluid and sodium in his diet and taking his medications as directed. He does not use NSAIDs.   LHC/RHC: LVEDP: 28mmHg,  moderate calcification LM: 70% ostial stenosis. 80% distal stenosis LAD: prox: moderate diffuse disease. mid: severe diffuse disease. distal: 70% stenosis. D1: 90% ostial stenosis, medium size. D2: mild diffuse disease, medium size LCX: 95% ostial stenosis. Distal: 80% stenosis. OM1: mod/severe diffuse disease Ramus: 90% stenosis RCA: Mid RCA  with bridging collaterals. RPDA: mod diffuse disease, not well seen. RPL: large size, mild diffuse disease  RA: 5 mmHg, RV: 59/9 mmHg, PA: 59/22/35 mmHg, PCWP: 17 mmHg,  CI: 2.8

## 2024-08-29 NOTE — CARDIOLOGY SUMMARY
[de-identified] : LHC/RHC: LVEDP: 28mmHg,  moderate calcification LM: 70% ostial stenosis. 80% distal stenosis LAD: prox: moderate diffuse disease. mid: severe diffuse disease. distal: 70% stenosis. D1: 90% ostial stenosis, medium size. D2: mild diffuse disease, medium size LCX: 95% ostial stenosis. Distal: 80% stenosis. OM1: mod/severe diffuse disease Ramus: 90% stenosis RCA: Mid RCA  with bridging collaterals. RPDA: mod diffuse disease, not well seen. RPL: large size, mild diffuse disease  RA: 5 mmHg, RV: 59/9 mmHg, PA: 59/22/35 mmHg, PCWP: 17 mmHg,  CI: 2.8

## 2024-08-29 NOTE — PHYSICAL EXAM
[Well Developed] : well developed [Well Nourished] : well nourished [Normal S1, S2] : normal S1, S2 [Clear Lung Fields] : clear lung fields [Good Air Entry] : good air entry [Soft] : abdomen soft [Non Tender] : non-tender [Normal Gait] : normal gait [No Edema] : no edema [No Rash] : no rash [Moves all extremities] : moves all extremities [No Focal Deficits] : no focal deficits [Alert and Oriented] : alert and oriented [Normal memory] : normal memory [de-identified] : ~6 JVP cmH20 [de-identified] : obese abdomen

## 2024-08-29 NOTE — HISTORY OF PRESENT ILLNESS
[FreeTextEntry1] : Mr. 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    He was recently admitted to [place] for cardiac and respiratory arrest after coming back from [facility].  CT chest showed bibasilar consolidations with patchy opacities and a large pleural effusion. Pt was intubated and ultimately was diagnosed with Covid/septic shock, in the ICU during breathings trials pt had  turned head the left and right with right gave and rigidity in the extremities. CTH, showed large appearing hypodensity in right PCA territory, upon review of initial CTH by neurology team not conclusive due to streak artifact, but some early ischemic changes may have been present in right PCA territory. EEG without epileptiform activity. MRI confirms right PCA infarct without structural damage to other areas including thalamus/brainstem. PT was ultimately trached and peg'd with recovery at 3-4 weeks when everything was removed.   He then was readmitted to [place] June 2nd for SOB, Orthopnea, LE edema> I was diuresed with IV Lasix however repeat TTE showed and even more decreased EF 25% GDMT optimization was started but pt was transferred to [place] for LHC results below. Pt was transferred to [facility] for bypass eval with Dr. Ardon. Surgery to be scheduled in the upcoming weeks. Pt presents today to establish care for his cardiomyopathy.    He states he has been feeling well since being discharged from the hospital. He walks two miles a day denies SOB/PRECIADO. He takes his BP at home systolics 110-120 HR 70-80 and weight has been stable. He denies swelling and abdominal distension. States he is staying away from salt and drinks about 2.2 L of fluid a day. He is taking his medication as instructed but he will need a refill. He has an appointment scheduled with Dr. Ardon but was told he should follow-up with HF team First. He reports no change in appetite. denies CP, palpitations, syncope, LH/dizziness, orthopnea, PND, abdominal discomfort, and LE edema. He has been limiting fluid and sodium in his diet and taking his medications as directed. He does not use NSAIDs.   LHC/RHC: LVEDP: 28mmHg,  moderate calcification LM: 70% ostial stenosis. 80% distal stenosis LAD: prox: moderate diffuse disease. mid: severe diffuse disease. distal: 70% stenosis. D1: 90% ostial stenosis, medium size. D2: mild diffuse disease, medium size LCX: 95% ostial stenosis. Distal: 80% stenosis. OM1: mod/severe diffuse disease Ramus: 90% stenosis RCA: Mid RCA  with bridging collaterals. RPDA: mod diffuse disease, not well seen. RPL: large size, mild diffuse disease  RA: 5 mmHg, RV: 59/9 mmHg, PA: 59/22/35 mmHg, PCWP: 17 mmHg,  CI: 2.8

## 2024-08-29 NOTE — CARDIOLOGY SUMMARY
[de-identified] : LHC/RHC: LVEDP: 28mmHg,  moderate calcification LM: 70% ostial stenosis. 80% distal stenosis LAD: prox: moderate diffuse disease. mid: severe diffuse disease. distal: 70% stenosis. D1: 90% ostial stenosis, medium size. D2: mild diffuse disease, medium size LCX: 95% ostial stenosis. Distal: 80% stenosis. OM1: mod/severe diffuse disease Ramus: 90% stenosis RCA: Mid RCA  with bridging collaterals. RPDA: mod diffuse disease, not well seen. RPL: large size, mild diffuse disease  RA: 5 mmHg, RV: 59/9 mmHg, PA: 59/22/35 mmHg, PCWP: 17 mmHg,  CI: 2.8

## 2024-08-29 NOTE — PHYSICAL EXAM
[Well Developed] : well developed [Well Nourished] : well nourished [Normal S1, S2] : normal S1, S2 [Clear Lung Fields] : clear lung fields [Good Air Entry] : good air entry [Soft] : abdomen soft [Non Tender] : non-tender [Normal Gait] : normal gait [No Edema] : no edema [No Rash] : no rash [Moves all extremities] : moves all extremities [No Focal Deficits] : no focal deficits [Alert and Oriented] : alert and oriented [Normal memory] : normal memory [de-identified] : ~6 JVP cmH20 [de-identified] : obese abdomen

## 2024-08-29 NOTE — PHYSICAL EXAM
[Well Developed] : well developed [Well Nourished] : well nourished [Normal S1, S2] : normal S1, S2 [Clear Lung Fields] : clear lung fields [Good Air Entry] : good air entry [Soft] : abdomen soft [Non Tender] : non-tender [Normal Gait] : normal gait [No Edema] : no edema [No Rash] : no rash [Moves all extremities] : moves all extremities [No Focal Deficits] : no focal deficits [Alert and Oriented] : alert and oriented [Normal memory] : normal memory [de-identified] : ~6 JVP cmH20 [de-identified] : obese abdomen

## 2024-08-29 NOTE — PHYSICAL EXAM
[Well Developed] : well developed [Well Nourished] : well nourished [Normal S1, S2] : normal S1, S2 [Clear Lung Fields] : clear lung fields [Good Air Entry] : good air entry [Soft] : abdomen soft [Non Tender] : non-tender [Normal Gait] : normal gait [No Edema] : no edema [No Rash] : no rash [Moves all extremities] : moves all extremities [No Focal Deficits] : no focal deficits [Alert and Oriented] : alert and oriented [Normal memory] : normal memory [de-identified] : ~6 JVP cmH20 [de-identified] : obese abdomen

## 2024-08-29 NOTE — ASSESSMENT
[FreeTextEntry1] : 63 yo man with MVCAD and HFrEF, prior CVA and cardiac arrest, Left retina detachment and blindness. Pt is ACC/AHA stage C endorsing NYHA CLass II-III symptoms. He is euvolemic and normotensive on exam. Currently tolerating GDMT with room for optimization. Plan As listed below.   ICM HFrEF  - Euvolemic on exam no need for diuretics at this time  - Continue Entresto 24-26 mg PO BID  - Increase Metoprolol XL to 50 mg PO BID  - Continue Jardiance 10 mg PO QD  - Will consider MRA in the upcoming visits.   3V CAD  - Continue Plavix 75 mg PO QD - COntinue Atorvastatin 80 mg PO QD  - Continue Aspirin 81 mg PO QD  - Continue Imdur 30 mg QD  - Plan for elective CABG with Dr. Ardon  *Tentative* RTC Aug 26th, 2024 for follow-up visit with Dr. Cueva.

## 2024-08-29 NOTE — PHYSICAL EXAM
[Well Developed] : well developed [Well Nourished] : well nourished [Normal S1, S2] : normal S1, S2 [Clear Lung Fields] : clear lung fields [Good Air Entry] : good air entry [Soft] : abdomen soft [Non Tender] : non-tender [Normal Gait] : normal gait [No Edema] : no edema [No Rash] : no rash [Moves all extremities] : moves all extremities [No Focal Deficits] : no focal deficits [Alert and Oriented] : alert and oriented [Normal memory] : normal memory [de-identified] : ~6 JVP cmH20 [de-identified] : obese abdomen

## 2024-09-02 ENCOUNTER — TRANSCRIPTION ENCOUNTER (OUTPATIENT)
Age: 64
End: 2024-09-02

## 2024-09-04 ENCOUNTER — TRANSCRIPTION ENCOUNTER (OUTPATIENT)
Age: 64
End: 2024-09-04

## 2024-09-09 ENCOUNTER — NON-APPOINTMENT (OUTPATIENT)
Age: 64
End: 2024-09-09

## 2024-09-10 ENCOUNTER — APPOINTMENT (OUTPATIENT)
Dept: HEART AND VASCULAR | Facility: CLINIC | Age: 64
End: 2024-09-10
Payer: MEDICAID

## 2024-09-10 ENCOUNTER — NON-APPOINTMENT (OUTPATIENT)
Age: 64
End: 2024-09-10

## 2024-09-10 VITALS
WEIGHT: 145 LBS | SYSTOLIC BLOOD PRESSURE: 100 MMHG | HEART RATE: 64 BPM | BODY MASS INDEX: 21.98 KG/M2 | HEIGHT: 68 IN | DIASTOLIC BLOOD PRESSURE: 60 MMHG | OXYGEN SATURATION: 99 %

## 2024-09-10 DIAGNOSIS — I25.10 ATHEROSCLEROTIC HEART DISEASE OF NATIVE CORONARY ARTERY W/OUT ANGINA PECTORIS: ICD-10-CM

## 2024-09-10 DIAGNOSIS — Z79.4 TYPE 2 DIABETES MELLITUS W/OUT COMPLICATIONS: ICD-10-CM

## 2024-09-10 DIAGNOSIS — E78.5 HYPERLIPIDEMIA, UNSPECIFIED: ICD-10-CM

## 2024-09-10 DIAGNOSIS — I63.9 CEREBRAL INFARCTION, UNSPECIFIED: ICD-10-CM

## 2024-09-10 DIAGNOSIS — I50.20 UNSPECIFIED SYSTOLIC (CONGESTIVE) HEART FAILURE: ICD-10-CM

## 2024-09-10 DIAGNOSIS — E11.9 TYPE 2 DIABETES MELLITUS W/OUT COMPLICATIONS: ICD-10-CM

## 2024-09-10 PROCEDURE — 93000 ELECTROCARDIOGRAM COMPLETE: CPT

## 2024-09-10 PROCEDURE — G2211 COMPLEX E/M VISIT ADD ON: CPT | Mod: NC

## 2024-09-10 PROCEDURE — 99215 OFFICE O/P EST HI 40 MIN: CPT | Mod: 25

## 2024-09-10 NOTE — HISTORY OF PRESENT ILLNESS
[FreeTextEntry1] : 63 y/o M here today for initial visit. 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  Admitted to [place] 5/31/24 for cardiac and respiratory arrest after coming back from [facility]. CT chest showed bibasilar consolidations with patchy opacities and a large pleural effusion. Pt was intubated and ultimately was diagnosed with Covid/septic shock, in the ICU during breathings trials pt had turned head the left and right with right gave and rigidity in the extremities. CTH, showed large appearing hypodensity in right PCA territory, upon review of initial CTH by neurology team not conclusive due to streak artifact, but some early ischemic changes may have been present in right PCA territory. EEG without epileptiform activity. MRI confirms right PCA infarct without structural damage to other areas including thalamus/brainstem. PT was ultimately trached and peg'd with recovery at 3-4 weeks when everything was removed.  He then was readmitted to [place] 6/2/24 for SOB, Orthopnea, LE edema, was diuresed with IV Lasix however repeat TTE showed decreased EF 25% GDMT optimized, [facility] with 3V CAD. after GDMT optimization, Pt underwent CABGX 3 on 7/31/24 (LIMA to LAD, RA to OM, SVG to PDA) at [facility] and was discharged on 8/5 on low dose BB and ARB. pt has had SBP 80-90's so his meds were stopped.  9/10/24:  no cp or sob.  feels a general fatigue, improving since CABG.   no significant leg swelling, trace edema in LLE more than RLE, this was site of GSV harvesting.

## 2024-09-10 NOTE — ASSESSMENT
[FreeTextEntry1] : 63 y/o M here today for initial visit. 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  EKG NSR unchanged T wave abnormality  Extensive records personally reviewed.  - fatigue, will obtain full TTE now > 1 month post CABG - no signs of heart failure, euvolemic only on jardiance 25mg daily - BPs low normal - currently on valsartan 20mg and toprol 25mg daily.  Will not uptitrate at this time.   - stable CAD - continue aspirin plavix lipitor 80mg daily - appreciate advanced HF care - return post TTE

## 2024-09-10 NOTE — PHYSICAL EXAM
[Well Developed] : well developed [Well Nourished] : well nourished [No Acute Distress] : no acute distress [Normal Conjunctiva] : normal conjunctiva [Normal Venous Pressure] : normal venous pressure [No Carotid Bruit] : no carotid bruit [Normal S1, S2] : normal S1, S2 [No Murmur] : no murmur [No Rub] : no rub [No Gallop] : no gallop [Clear Lung Fields] : clear lung fields [Good Air Entry] : good air entry [No Respiratory Distress] : no respiratory distress  [Soft] : abdomen soft [Non Tender] : non-tender [No Masses/organomegaly] : no masses/organomegaly [Normal Bowel Sounds] : normal bowel sounds [Moves all extremities] : moves all extremities [No Focal Deficits] : no focal deficits [Normal Speech] : normal speech [No ulcers] : no ulcers [No edema] : no edema [No varicosities] : no varicosities [No chronic venous stasis changes] : no chronic venous stasis changes [No cyanosis] : no cyanosis [No rashes] : no rashes [Normal peripheral pulses] : : normal peripheral pulses [de-identified] : well healing sternotomy [de-identified] : L GSV harvested for CABG

## 2024-09-10 NOTE — CARDIOLOGY SUMMARY
[de-identified] : Echo 8/2/24: 1. Limited study obtained for evaluation of left ventricular function.  2. Moderately reduced left ventricular systolic function.  3. Multiple segmental abnormalities exist. See findings.  4. Regional wall motion abnormalities consistent with ischemic heart disease.  5. Mildly reduced right ventricular systolic function.  6. Small pericardial effusion without echocardiographic evidence of cardiac tamponade physiology.  Echo 7/29/24: 1. Severely reduced left ventricular systolic function.  2. Regional wall motion abnormalities consistent with ischemic heart disease.  3. Normal right ventricular size and systolic function.  4. Normal atria.  5. Mild-to-moderate aortic regurgitation.  6. Mild mitral regurgitation.  7. Mild tricuspid regurgitation.  8. Pulmonary hypertension present, pulmonary artery systolic pressure is 60 mmHg.  9. No pericardial effusion. 10. Compared to the previous TTE performed on 6/5/2024, there is evidence of pulmonary HTN in this study.  Echo 6/5/24: 1. Moderately-to-severely reduced left ventricular systolic function.  2. Multiple segmental abnormalities exist. See findings.  3. Grade I left ventricular diastolic dysfunction.  4. Normal right ventricular size and systolic function.  5. Mild aortic regurgitation.  6. Mild mitral regurgitation.  7. No evidence of pulmonary hypertension, pulmonary artery systolic pressure is 28 mmHg.  8. Trivial pericardial effusion.  9. No prior echo is available for comparison.  Echo 5/31/24: 1. Left ventricular cavity is moderately dilated. Left ventricular systolic function is severely decreased with an ejection fraction visually estimated at 25 %. Global left ventricular hypokinesis. 2. There is severe (grade 3) left ventricular diastolic dysfunction, with elevated filling pressure. 3. Based on visual assessment, the right ventricle appears mildly enlarged. borderline reduced systolic function. 4. The left atrium is moderately dilated. 5. The right atrium is mildly dilated. 6. Moderate mitral regurgitation. 7. Mild aortic regurgitation. 8. Moderate tricuspid regurgitation. 9. Estimated pulmonary artery systolic pressure is 66 mmHg, consistent with severe pulmonary hypertension. 10. Left pleural effusion noted. 11. Technically difficult image quality. Compared to prior study 1/23/2024 LVEF has mildly worsened, RV findings are new, pulmonary pressures are significantly higher (previously 33mmHg), and left pleural effusion is new.  [de-identified] : Cardiac Cath/PCI 6/3/24:  LHC/RHC: LVEDP: 28mmHg, moderate calcification LM: 70% ostial stenosis. 80% distal stenosis LAD: prox: moderate diffuse disease. mid: severe diffuse disease. distal: 70% stenosis. D1: 90% ostial stenosis, medium size. D2: mild diffuse disease, medium size LCX: 95% ostial stenosis. Distal: 80% stenosis. OM1: mod/severe diffuse disease Ramus: 90% stenosis RCA: Mid RCA  with bridging collaterals. RPDA: mod diffuse disease, not well seen. RPL: large size, mild diffuse disease RA: 5 mmHg, RV: 59/9 mmHg, PA: 59/22/35 mmHg, PCWP: 17 mmHg, CI: 2.8  CABGX 3 on 7/31/24 (LIMA to LAD, RA to OM, SVG to PDA) at [facility]

## 2024-09-17 ENCOUNTER — APPOINTMENT (OUTPATIENT)
Dept: CARDIOLOGY | Facility: CLINIC | Age: 64
End: 2024-09-17
Payer: MEDICAID

## 2024-09-17 VITALS
WEIGHT: 147 LBS | BODY MASS INDEX: 22.28 KG/M2 | OXYGEN SATURATION: 98 % | DIASTOLIC BLOOD PRESSURE: 58 MMHG | HEIGHT: 68 IN | SYSTOLIC BLOOD PRESSURE: 128 MMHG | HEART RATE: 87 BPM

## 2024-09-17 DIAGNOSIS — Z95.1 PRESENCE OF AORTOCORONARY BYPASS GRAFT: ICD-10-CM

## 2024-09-17 PROCEDURE — 93015 CV STRESS TEST SUPVJ I&R: CPT

## 2024-09-17 NOTE — REVIEW OF SYSTEMS
[SOB] : no shortness of breath [Dyspnea on exertion] : not dyspnea during exertion [Chest Discomfort] : no chest discomfort [Lower Ext Edema] : no extremity edema [Palpitations] : no palpitations

## 2024-09-17 NOTE — REASON FOR VISIT
[Other: _____] : [unfilled] [FreeTextEntry1] : Charles Steinberg presents for exercise stress test to enroll in cardiac rehabilitation.   Mr. Steinberg denies chest pain, SOB, palpitations, dizziness or syncope.

## 2024-09-17 NOTE — HISTORY OF PRESENT ILLNESS
[FreeTextEntry1] : 64 year old male with history of DM type 2 who presented to [facility] with cardiac and respiratory arrest on 1/2024 s/p ROSC, diagnosed with COVID/septic shock s/p trach and PEG (now removed), right PCA infarct, LV systolic dysfunction (EF 30%). He was discharged to LTAC.  5/31/2024 [facility] admission for CHF exacerbation. He was diuresed. Echo showed LVEF 25%. GDMT optimized. S/p cardiac catheterization which showed 3v CAD. S/p CABG x 3 on 7/31/24 (LIMA to LAD, RA to OM, SVG to PDA) at [facility] and was discharged on 8/5/24.

## 2024-09-17 NOTE — CARDIOLOGY SUMMARY
[de-identified] : 9/10/24 Sinus nonspecific T abnormality [de-identified] : 8/2/24  1. Limited study obtained for evaluation of left ventricular function.  2. Moderately reduced left ventricular systolic function.  3. Multiple segmental abnormalities exist. See findings.  4. Regional wall motion abnormalities consistent with ischemic heart disease.  5. Mildly reduced right ventricular systolic function.  6. Small pericardial effusion without echocardiographic evidence of cardiac tamponade physiology.

## 2024-09-17 NOTE — PHYSICAL EXAM
[No Acute Distress] : no acute distress [Normal S1, S2] : normal S1, S2 [Clear Lung Fields] : clear lung fields [Good Air Entry] : good air entry [No Respiratory Distress] : no respiratory distress  [Normal Gait] : normal gait [Gait - Sufficient for Exercise Testing] : gait - sufficient for exercise testing [No Rash] : no rash [Moves all extremities] : moves all extremities [Normal Speech] : normal speech [Alert and Oriented] : alert and oriented

## 2024-09-26 ENCOUNTER — APPOINTMENT (OUTPATIENT)
Dept: HEART FAILURE | Facility: CLINIC | Age: 64
End: 2024-09-26
Payer: MEDICAID

## 2024-09-26 VITALS
BODY MASS INDEX: 22.28 KG/M2 | OXYGEN SATURATION: 98 % | WEIGHT: 147 LBS | DIASTOLIC BLOOD PRESSURE: 60 MMHG | SYSTOLIC BLOOD PRESSURE: 140 MMHG | TEMPERATURE: 98.7 F | HEIGHT: 68 IN | HEART RATE: 75 BPM

## 2024-09-26 PROCEDURE — 99214 OFFICE O/P EST MOD 30 MIN: CPT

## 2024-09-26 NOTE — HISTORY OF PRESENT ILLNESS
[FreeTextEntry1] : Mr. 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    He was recently admitted to [place] for cardiac and respiratory arrest after coming back from [facility].  CT chest showed bibasilar consolidations with patchy opacities and a large pleural effusion. Pt was intubated and ultimately was diagnosed with Covid/septic shock, in the ICU during breathings trials pt had  turned head the left and right with right gave and rigidity in the extremities. CTH, showed large appearing hypodensity in right PCA territory, upon review of initial CTH by neurology team not conclusive due to streak artifact, but some early ischemic changes may have been present in right PCA territory. EEG without epileptiform activity. MRI confirms right PCA infarct without structural damage to other areas including thalamus/brainstem. PT was ultimately trached and peg'd with recovery at 3-4 weeks when everything was removed.   He then was readmitted to [place] June 2nd for SOB, Orthopnea, LE edema, was diuresed with IV Lasix however repeat TTE showed decreased EF 25% GDMT optimized, LHC with 3V CAD. after GDMT optimization, Pt underwent CABGX 3 on 7/31 and was discharged on 8/5 on low dose BB and ARB. pt has had SBP 80-90's so his meds was stopped.   He states he has been feeling well, just feeling weak and is currently waiting to get into cardiac rehab. He takes his BP at home systolic  HR 70-80 and weight has been [id number]. He is still walking 4000 steps a day, takes hills slowly. He has a good appetite and limits his salt. He denies CP, palpitations, syncope, LH/dizziness, orthopnea, PND, abdominal discomfort, and LE edema. He has been limiting fluid and sodium in his diet and taking his medications as directed. He does not use NSAIDs.

## 2024-09-26 NOTE — PHYSICAL EXAM
[Well Developed] : well developed [Well Nourished] : well nourished [Normal S1, S2] : normal S1, S2 [Clear Lung Fields] : clear lung fields [Good Air Entry] : good air entry [Soft] : abdomen soft [Non Tender] : non-tender [Normal Gait] : normal gait [No Edema] : no edema [No Rash] : no rash [Moves all extremities] : moves all extremities [No Focal Deficits] : no focal deficits [Alert and Oriented] : alert and oriented [Normal memory] : normal memory [de-identified] : ~6 JVP cmH20 [de-identified] : obese abdomen

## 2024-09-26 NOTE — ASSESSMENT
[FreeTextEntry1] : 65 yo man with MVCAD and HFrEF, prior CVA and cardiac arrest, Left retina detachment and blindness. Pt is ACC/AHA stage C endorsing NYHA CLass II-III symptoms. He is euvolemic and normotensive on exam. Currently tolerating GDMT with room for optimization. Plan As listed below.   ICM HFrEF EF 30% - Euvolemic on exam no need for diuretics at this time  - Recommend stopping Valsartan 20mg and starting Entresto 24-26 mg PO BID  - Continue Metoprolol XL 25 mg PO QD - Continue Jardiance. - Will consider MRA in the upcoming visits. - Take your weight +BP daily and report weight gain of 3 lb in one day or 5lbs in one that is unrelieved by diuretic therapy. Notify office of HR <60 or BP systolic < 90 for medication optimization. Limit your sodium intake and Keep fluids between 1.5-2L daily.   3V CAD  - Continue Aspirin and Plavix  - Continue Atorvastatin 80 mg PO QD  -sternotomy healing well. radial graft and vein graft sites healing w/o collection/hematoma.   referral to cardiac rehab placed and emailed the rehab team at [facility] 3months will repeat TTE to assess Lv function

## 2024-09-26 NOTE — CARDIOLOGY SUMMARY
[de-identified] : LHC/RHC: LVEDP: 28mmHg,  moderate calcification LM: 70% ostial stenosis. 80% distal stenosis LAD: prox: moderate diffuse disease. mid: severe diffuse disease. distal: 70% stenosis. D1: 90% ostial stenosis, medium size. D2: mild diffuse disease, medium size LCX: 95% ostial stenosis. Distal: 80% stenosis. OM1: mod/severe diffuse disease Ramus: 90% stenosis RCA: Mid RCA  with bridging collaterals. RPDA: mod diffuse disease, not well seen. RPL: large size, mild diffuse disease  RA: 5 mmHg, RV: 59/9 mmHg, PA: 59/22/35 mmHg, PCWP: 17 mmHg,  CI: 2.8
Elmira Sepulveda [id number]

## 2024-10-01 ENCOUNTER — APPOINTMENT (OUTPATIENT)
Dept: ENDOCRINOLOGY | Facility: CLINIC | Age: 64
End: 2024-10-01
Payer: MEDICAID

## 2024-10-01 VITALS
DIASTOLIC BLOOD PRESSURE: 74 MMHG | HEIGHT: 66.5 IN | HEART RATE: 69 BPM | SYSTOLIC BLOOD PRESSURE: 128 MMHG | BODY MASS INDEX: 23.5 KG/M2 | OXYGEN SATURATION: 100 % | WEIGHT: 148 LBS

## 2024-10-01 DIAGNOSIS — Z91.89 OTHER SPECIFIED PERSONAL RISK FACTORS, NOT ELSEWHERE CLASSIFIED: ICD-10-CM

## 2024-10-01 DIAGNOSIS — I25.10 ATHEROSCLEROTIC HEART DISEASE OF NATIVE CORONARY ARTERY W/OUT ANGINA PECTORIS: ICD-10-CM

## 2024-10-01 DIAGNOSIS — E78.5 HYPERLIPIDEMIA, UNSPECIFIED: ICD-10-CM

## 2024-10-01 DIAGNOSIS — I63.9 CEREBRAL INFARCTION, UNSPECIFIED: ICD-10-CM

## 2024-10-01 DIAGNOSIS — I50.20 UNSPECIFIED SYSTOLIC (CONGESTIVE) HEART FAILURE: ICD-10-CM

## 2024-10-01 DIAGNOSIS — R79.89 OTHER SPECIFIED ABNORMAL FINDINGS OF BLOOD CHEMISTRY: ICD-10-CM

## 2024-10-01 LAB — GLUCOSE BLDC GLUCOMTR-MCNC: 135

## 2024-10-01 PROCEDURE — G2211 COMPLEX E/M VISIT ADD ON: CPT | Mod: NC

## 2024-10-01 PROCEDURE — 82962 GLUCOSE BLOOD TEST: CPT

## 2024-10-01 PROCEDURE — 99214 OFFICE O/P EST MOD 30 MIN: CPT

## 2024-10-01 RX ORDER — BLOOD-GLUCOSE METER
EACH MISCELLANEOUS
Qty: 1 | Refills: 1 | Status: ACTIVE | COMMUNITY
Start: 2024-10-01 | End: 1900-01-01

## 2024-10-01 RX ORDER — ATORVASTATIN CALCIUM 80 MG/1
80 TABLET, FILM COATED ORAL
Refills: 0 | Status: ACTIVE | COMMUNITY

## 2024-10-01 RX ORDER — SACUBITRIL AND VALSARTAN 24; 26 MG/1; MG/1
24-26 TABLET, FILM COATED ORAL TWICE DAILY
Qty: 180 | Refills: 3 | Status: ACTIVE | COMMUNITY
Start: 2024-09-26

## 2024-10-01 RX ORDER — GLUCAGON 1 MG
1 KIT INJECTION
Qty: 1 | Refills: 3 | Status: ACTIVE | COMMUNITY
Start: 2024-10-01 | End: 1900-01-01

## 2024-10-01 RX ORDER — DEXTROSE 3.75 G
4 TABLET,CHEWABLE ORAL
Qty: 1 | Refills: 11 | Status: ACTIVE | COMMUNITY
Start: 2024-10-01 | End: 1900-01-01

## 2024-10-01 NOTE — PHYSICAL EXAM
[Alert] : alert [Healthy Appearance] : healthy appearance [No Acute Distress] : no acute distress [Well Developed] : well developed [Normal Voice/Communication] : normal voice communication [EOMI] : extra ocular movement intact [Normal Hearing] : hearing was normal [No Respiratory Distress] : no respiratory distress [Normal Rate and Effort] : normal respiratory rate and effort [Normal Rate] : heart rate was normal [No Edema] : no peripheral edema [Spine Straight] : spine straight [No Stigmata of Cushings Syndrome] : no stigmata of Cushings Syndrome [Normal Gait] : normal gait [Normal Strength/Tone] : muscle strength and tone were normal [No Rash] : no rash [Acanthosis Nigricans] : no acanthosis nigricans [Foot Ulcers] : no foot ulcers [Hirsutism] : no hirsutism [Oriented x3] : oriented to person, place, and time [Normal Affect] : the affect was normal [Recent Memory Normal] : recent memory was not impaired [Normal Insight/Judgement] : insight and judgment were intact [Normal Mood] : the mood was normal [Remote Memory Normal] : remote memory was not impaired [de-identified] : Well-healed sternotomy incision.  [de-identified] : Left sided exotropia. No other observed focal neurological deficits.

## 2024-10-01 NOTE — HISTORY OF PRESENT ILLNESS
[FreeTextEntry1] : In January 2024 the patient was admitted to [facility] for cardiac and respiratory arrest found to have myocardial infarction and CVA in the presence of COVID infection/sepsis.  Underwent CABG at the end of July 2024. The experience has been emotionally draining for patient and family.   "Michi" DM2 diagnosed more than 20 years ago, complicated with CAD, CHFrEF, CVA, and BIRGIT. Taking Lantus 10 units daily and Humalog 7-9 units before meals with SSI, Jardiance 25 mg daily Compliance: Good Tolerating: Yes Therapies tried: Insulin, Jardiance Last HgA1c in September 2024 was 6.0%, previously in June 2024 was 7.4%. Checks BG at least 4 times daily; rarely above 150 mg/dL. Fasting BG ranges from 50- 110 mg/dl. Patient not interested in wearing CGM sensor due to concern of others seeing it and knowing he has DM. Hypoglycemia: Has hypoglycemia unawareness. Denies frequent or severe episodes.  Use of CGM for low blood glucose alarms has been discussed, though patient defers. Polyuria, polydipsia, unintentional weight loss: Denies   Diabetic emergencies: Denies  Microvascular complications Last albumin/creatinine ratio: Normal; September 2024 Neuropathy symptoms: Denies Microfilament exam: normal; June 2024 Retinopathy: Yes, sees specialist regularly. Last eye exam: At least annually   Macrovascular complications CAD/CVA/PVD: CAD s/p CABG x3 (7/31/24), CVA   HTN: Yes, taking Entresto twice daily LDL: 32 mg/dL in 9/2024 while on atorvastatin 80 mg daily. Goal is < 70 mg/dL.   Diet: Carb-restrictive Exercise: 5,000 steps per day Immunizations: Discussed need for pneumonia vaccine. Otherwise, UTD. Family DM History: Both parents.

## 2024-10-01 NOTE — REASON FOR VISIT
[Follow - Up] : a follow-up visit [DM Type 2] : DM Type 2 [Spouse] : spouse [Source: ______] : History obtained from [facility]

## 2024-10-04 DIAGNOSIS — E11.9 TYPE 2 DIABETES MELLITUS W/OUT COMPLICATIONS: ICD-10-CM

## 2024-10-04 DIAGNOSIS — Z79.4 TYPE 2 DIABETES MELLITUS W/OUT COMPLICATIONS: ICD-10-CM

## 2024-10-04 RX ORDER — BLOOD SUGAR DIAGNOSTIC
STRIP MISCELLANEOUS 4 TIMES DAILY
Qty: 4 | Refills: 3 | Status: ACTIVE | COMMUNITY
Start: 2024-10-04 | End: 1900-01-01

## 2024-10-04 RX ORDER — BLOOD-GLUCOSE,RECEIVER,CONT
EACH MISCELLANEOUS
Qty: 1 | Refills: 0 | Status: ACTIVE | COMMUNITY
Start: 2024-10-04 | End: 1900-01-01

## 2024-10-04 RX ORDER — GLUCAGON INJECTION, SOLUTION 1 MG/.2ML
1 INJECTION, SOLUTION SUBCUTANEOUS
Qty: 1 | Refills: 1 | Status: ACTIVE | COMMUNITY
Start: 2024-10-04 | End: 1900-01-01

## 2024-10-04 RX ORDER — BLOOD-GLUCOSE SENSOR
EACH MISCELLANEOUS
Qty: 6 | Refills: 3 | Status: ACTIVE | COMMUNITY
Start: 2024-10-04 | End: 1900-01-01

## 2024-10-04 RX ORDER — BLOOD-GLUCOSE SENSOR
EACH MISCELLANEOUS
Qty: 6 | Refills: 2 | Status: ACTIVE | COMMUNITY
Start: 2024-10-04 | End: 1900-01-01

## 2024-10-04 RX ORDER — BLOOD-GLUCOSE METER
EACH MISCELLANEOUS
Qty: 1 | Refills: 0 | Status: ACTIVE | COMMUNITY
Start: 2024-10-04 | End: 1900-01-01

## 2024-10-29 ENCOUNTER — RESULT REVIEW (OUTPATIENT)
Age: 64
End: 2024-10-29

## 2024-11-05 ENCOUNTER — APPOINTMENT (OUTPATIENT)
Dept: HEART AND VASCULAR | Facility: CLINIC | Age: 64
End: 2024-11-05

## 2024-11-07 ENCOUNTER — MED ADMIN CHARGE (OUTPATIENT)
Age: 64
End: 2024-11-07

## 2024-11-07 ENCOUNTER — NON-APPOINTMENT (OUTPATIENT)
Age: 64
End: 2024-11-07

## 2024-11-07 ENCOUNTER — APPOINTMENT (OUTPATIENT)
Dept: HEART AND VASCULAR | Facility: CLINIC | Age: 64
End: 2024-11-07
Payer: MEDICAID

## 2024-11-07 VITALS
HEART RATE: 69 BPM | OXYGEN SATURATION: 99 % | BODY MASS INDEX: 23.95 KG/M2 | HEIGHT: 66 IN | DIASTOLIC BLOOD PRESSURE: 54 MMHG | WEIGHT: 149 LBS | SYSTOLIC BLOOD PRESSURE: 124 MMHG

## 2024-11-07 DIAGNOSIS — H53.40 UNSPECIFIED VISUAL FIELD DEFECTS: ICD-10-CM

## 2024-11-07 DIAGNOSIS — Z79.4 TYPE 2 DIABETES MELLITUS W/OUT COMPLICATIONS: ICD-10-CM

## 2024-11-07 DIAGNOSIS — I25.10 ATHEROSCLEROTIC HEART DISEASE OF NATIVE CORONARY ARTERY W/OUT ANGINA PECTORIS: ICD-10-CM

## 2024-11-07 DIAGNOSIS — Z23 ENCOUNTER FOR IMMUNIZATION: ICD-10-CM

## 2024-11-07 DIAGNOSIS — I50.20 UNSPECIFIED SYSTOLIC (CONGESTIVE) HEART FAILURE: ICD-10-CM

## 2024-11-07 DIAGNOSIS — I63.9 CEREBRAL INFARCTION, UNSPECIFIED: ICD-10-CM

## 2024-11-07 DIAGNOSIS — E11.9 TYPE 2 DIABETES MELLITUS W/OUT COMPLICATIONS: ICD-10-CM

## 2024-11-07 DIAGNOSIS — Z95.1 PRESENCE OF AORTOCORONARY BYPASS GRAFT: ICD-10-CM

## 2024-11-07 PROCEDURE — 90471 IMMUNIZATION ADMIN: CPT

## 2024-11-07 PROCEDURE — 90656 IIV3 VACC NO PRSV 0.5 ML IM: CPT

## 2024-11-07 PROCEDURE — G2211 COMPLEX E/M VISIT ADD ON: CPT | Mod: NC

## 2024-11-07 PROCEDURE — 93000 ELECTROCARDIOGRAM COMPLETE: CPT

## 2024-11-07 PROCEDURE — 99214 OFFICE O/P EST MOD 30 MIN: CPT

## 2024-11-08 LAB
ALBUMIN SERPL ELPH-MCNC: 4.7 G/DL
ALP BLD-CCNC: 88 U/L
ALT SERPL-CCNC: 15 U/L
ANION GAP SERPL CALC-SCNC: 12 MMOL/L
AST SERPL-CCNC: 22 U/L
BILIRUB SERPL-MCNC: 0.2 MG/DL
BUN SERPL-MCNC: 22 MG/DL
CALCIUM SERPL-MCNC: 9.9 MG/DL
CHLORIDE SERPL-SCNC: 101 MMOL/L
CHOLEST SERPL-MCNC: 108 MG/DL
CO2 SERPL-SCNC: 26 MMOL/L
CREAT SERPL-MCNC: 0.93 MG/DL
EGFR: 93 ML/MIN/1.73M2
ESTIMATED AVERAGE GLUCOSE: 128 MG/DL
GLUCOSE SERPL-MCNC: 66 MG/DL
HBA1C MFR BLD HPLC: 6.1 %
HCT VFR BLD CALC: 45.5 %
HDLC SERPL-MCNC: 52 MG/DL
HGB BLD-MCNC: 14.3 G/DL
LDLC SERPL CALC-MCNC: 42 MG/DL
MCHC RBC-ENTMCNC: 27.1 PG
MCHC RBC-ENTMCNC: 31.4 G/DL
MCV RBC AUTO: 86.3 FL
NONHDLC SERPL-MCNC: 55 MG/DL
NT-PROBNP SERPL-MCNC: 709 PG/ML
PLATELET # BLD AUTO: 235 K/UL
POTASSIUM SERPL-SCNC: 4.2 MMOL/L
PROT SERPL-MCNC: 7.4 G/DL
RBC # BLD: 5.27 M/UL
RBC # FLD: 17 %
SODIUM SERPL-SCNC: 139 MMOL/L
TRIGL SERPL-MCNC: 59 MG/DL
TSH SERPL-ACNC: 0.85 UIU/ML
WBC # FLD AUTO: 6.01 K/UL

## 2024-12-19 ENCOUNTER — APPOINTMENT (OUTPATIENT)
Dept: HEART AND VASCULAR | Facility: CLINIC | Age: 64
End: 2024-12-19
Payer: MEDICAID

## 2024-12-19 ENCOUNTER — NON-APPOINTMENT (OUTPATIENT)
Age: 64
End: 2024-12-19

## 2024-12-19 DIAGNOSIS — I50.20 UNSPECIFIED SYSTOLIC (CONGESTIVE) HEART FAILURE: ICD-10-CM

## 2024-12-19 DIAGNOSIS — E78.5 HYPERLIPIDEMIA, UNSPECIFIED: ICD-10-CM

## 2024-12-19 DIAGNOSIS — I25.10 ATHEROSCLEROTIC HEART DISEASE OF NATIVE CORONARY ARTERY W/OUT ANGINA PECTORIS: ICD-10-CM

## 2024-12-19 DIAGNOSIS — Z95.1 PRESENCE OF AORTOCORONARY BYPASS GRAFT: ICD-10-CM

## 2024-12-19 DIAGNOSIS — I63.9 CEREBRAL INFARCTION, UNSPECIFIED: ICD-10-CM

## 2024-12-19 PROCEDURE — 99214 OFFICE O/P EST MOD 30 MIN: CPT

## 2024-12-19 PROCEDURE — 93000 ELECTROCARDIOGRAM COMPLETE: CPT

## 2024-12-19 PROCEDURE — G2211 COMPLEX E/M VISIT ADD ON: CPT | Mod: NC

## 2024-12-19 RX ORDER — SPIRONOLACTONE 25 MG/1
25 TABLET ORAL
Qty: 90 | Refills: 3 | Status: ACTIVE | COMMUNITY
Start: 2024-12-19 | End: 1900-01-01

## 2025-02-07 ENCOUNTER — APPOINTMENT (OUTPATIENT)
Dept: ENDOCRINOLOGY | Facility: CLINIC | Age: 65
End: 2025-02-07
Payer: MEDICAID

## 2025-02-07 VITALS
BODY MASS INDEX: 23.15 KG/M2 | DIASTOLIC BLOOD PRESSURE: 66 MMHG | HEIGHT: 66 IN | SYSTOLIC BLOOD PRESSURE: 112 MMHG | WEIGHT: 144.04 LBS | OXYGEN SATURATION: 100 % | HEART RATE: 66 BPM

## 2025-02-07 DIAGNOSIS — E11.9 TYPE 2 DIABETES MELLITUS W/OUT COMPLICATIONS: ICD-10-CM

## 2025-02-07 DIAGNOSIS — I25.10 ATHEROSCLEROTIC HEART DISEASE OF NATIVE CORONARY ARTERY W/OUT ANGINA PECTORIS: ICD-10-CM

## 2025-02-07 DIAGNOSIS — E78.5 HYPERLIPIDEMIA, UNSPECIFIED: ICD-10-CM

## 2025-02-07 DIAGNOSIS — I50.20 UNSPECIFIED SYSTOLIC (CONGESTIVE) HEART FAILURE: ICD-10-CM

## 2025-02-07 DIAGNOSIS — Z79.4 TYPE 2 DIABETES MELLITUS W/OUT COMPLICATIONS: ICD-10-CM

## 2025-02-07 DIAGNOSIS — I63.9 CEREBRAL INFARCTION, UNSPECIFIED: ICD-10-CM

## 2025-02-07 DIAGNOSIS — Z91.89 OTHER SPECIFIED PERSONAL RISK FACTORS, NOT ELSEWHERE CLASSIFIED: ICD-10-CM

## 2025-02-07 DIAGNOSIS — R79.89 OTHER SPECIFIED ABNORMAL FINDINGS OF BLOOD CHEMISTRY: ICD-10-CM

## 2025-02-07 PROCEDURE — 95251 CONT GLUC MNTR ANALYSIS I&R: CPT

## 2025-02-07 PROCEDURE — G2211 COMPLEX E/M VISIT ADD ON: CPT | Mod: NC

## 2025-02-07 PROCEDURE — 99214 OFFICE O/P EST MOD 30 MIN: CPT

## 2025-02-18 DIAGNOSIS — E11.9 TYPE 2 DIABETES MELLITUS W/OUT COMPLICATIONS: ICD-10-CM

## 2025-02-18 DIAGNOSIS — Z79.4 TYPE 2 DIABETES MELLITUS W/OUT COMPLICATIONS: ICD-10-CM

## 2025-02-18 RX ORDER — LANCETS 30 GAUGE
EACH MISCELLANEOUS
Qty: 4 | Refills: 3 | Status: ACTIVE | COMMUNITY
Start: 2025-02-18 | End: 1900-01-01

## 2025-02-18 RX ORDER — BLOOD SUGAR DIAGNOSTIC
STRIP MISCELLANEOUS
Qty: 4 | Refills: 3 | Status: ACTIVE | COMMUNITY
Start: 2025-02-18 | End: 1900-01-01

## 2025-02-18 RX ORDER — BLOOD-GLUCOSE METER
W/DEVICE KIT MISCELLANEOUS
Qty: 1 | Refills: 0 | Status: ACTIVE | COMMUNITY
Start: 2025-02-18 | End: 1900-01-01

## 2025-02-24 ENCOUNTER — NON-APPOINTMENT (OUTPATIENT)
Age: 65
End: 2025-02-24

## 2025-02-24 ENCOUNTER — APPOINTMENT (OUTPATIENT)
Dept: HEART FAILURE | Facility: CLINIC | Age: 65
End: 2025-02-24
Payer: MEDICAID

## 2025-02-24 VITALS
BODY MASS INDEX: 23.56 KG/M2 | TEMPERATURE: 98.3 F | DIASTOLIC BLOOD PRESSURE: 63 MMHG | SYSTOLIC BLOOD PRESSURE: 126 MMHG | HEIGHT: 66 IN | HEART RATE: 72 BPM | OXYGEN SATURATION: 100 % | WEIGHT: 146.6 LBS

## 2025-02-24 PROCEDURE — 93000 ELECTROCARDIOGRAM COMPLETE: CPT

## 2025-02-24 PROCEDURE — 99215 OFFICE O/P EST HI 40 MIN: CPT | Mod: 25

## 2025-03-12 NOTE — PATIENT PROFILE ADULT - FUNCTIONAL ASSESSMENT - BASIC MOBILITY 1.
[facility]CS VAD Pump Info       Row Name 03/12/25 0820             [facility]CS VAD Information    Implant LVAD      RVAD Pump HeartMate 3      LVAD Pump --         Heartmate 3 LEFT VS    Flow (Lpm) 5.6 Lpm      Pulse Index (PI) 1.6 PI      Speed (rpm) 6000 rpm      Power (ramírez) 5.2 ramírez      Current Hct setting 43.3         Heartmate 3 Right (centrifugal flow) VS    Flow (Lpm) --      Pulse Index (PI) --      Speed (rpm) --      Power (ramírez) --      Current Hct setting --         Primary Controller    Serial number [id number]      Low flow alarm setting 2.5      High watt alarm setting --      EBB: Patient use 23      Replace in 26 Months         Backup Controller    Serial number [id number]      EBB: Patient use 8      Replace EBB in 19 Months      Speed & HCT match primary controller Y         VAD Interrogation    Alarms reported by patient N      Unexpected alarms noted upon interrogation None      PI events Frequent  PI range 1.6-7.4 HX 20hrs      Damage to equipment is noted N      Action taken --         Driveline Exit Site    Dressing change done N      Driveline properly secured Yes      DLES assessment c/d/i per pt report      Dressing used Weekly kit      Frequency patient changes dressing Weekly      Dressing modifications --      Dressing change supplier --                      Education Complete: Yes   Charge the BACKUP controller s backup battery every 6 months  Perform a self test on BACKUP every 6 months  Change the MPU s batteries every 6 months:Yes  Have equipment serviced yearly (if applicable):Yes         4 = No assist / stand by assistance

## 2025-03-25 ENCOUNTER — APPOINTMENT (OUTPATIENT)
Dept: CARDIOLOGY | Facility: CLINIC | Age: 65
End: 2025-03-25
Payer: MEDICAID

## 2025-03-25 PROCEDURE — 93306 TTE W/DOPPLER COMPLETE: CPT

## 2025-03-27 ENCOUNTER — APPOINTMENT (OUTPATIENT)
Dept: HEART AND VASCULAR | Facility: CLINIC | Age: 65
End: 2025-03-27
Payer: MEDICAID

## 2025-03-27 ENCOUNTER — NON-APPOINTMENT (OUTPATIENT)
Age: 65
End: 2025-03-27

## 2025-03-27 VITALS
DIASTOLIC BLOOD PRESSURE: 60 MMHG | WEIGHT: 147 LBS | OXYGEN SATURATION: 100 % | BODY MASS INDEX: 23.73 KG/M2 | SYSTOLIC BLOOD PRESSURE: 130 MMHG | HEART RATE: 72 BPM

## 2025-03-27 DIAGNOSIS — I63.9 CEREBRAL INFARCTION, UNSPECIFIED: ICD-10-CM

## 2025-03-27 DIAGNOSIS — Z79.4 TYPE 2 DIABETES MELLITUS W/OUT COMPLICATIONS: ICD-10-CM

## 2025-03-27 DIAGNOSIS — E11.9 TYPE 2 DIABETES MELLITUS W/OUT COMPLICATIONS: ICD-10-CM

## 2025-03-27 DIAGNOSIS — Z95.1 PRESENCE OF AORTOCORONARY BYPASS GRAFT: ICD-10-CM

## 2025-03-27 DIAGNOSIS — I50.20 UNSPECIFIED SYSTOLIC (CONGESTIVE) HEART FAILURE: ICD-10-CM

## 2025-03-27 DIAGNOSIS — I25.10 ATHEROSCLEROTIC HEART DISEASE OF NATIVE CORONARY ARTERY W/OUT ANGINA PECTORIS: ICD-10-CM

## 2025-03-27 PROCEDURE — 93000 ELECTROCARDIOGRAM COMPLETE: CPT

## 2025-03-27 PROCEDURE — G2211 COMPLEX E/M VISIT ADD ON: CPT | Mod: NC

## 2025-03-27 PROCEDURE — 99214 OFFICE O/P EST MOD 30 MIN: CPT

## 2025-03-28 LAB
ALBUMIN SERPL ELPH-MCNC: 4.5 G/DL
ALP BLD-CCNC: 89 U/L
ALT SERPL-CCNC: 12 U/L
ANION GAP SERPL CALC-SCNC: 24 MMOL/L
AST SERPL-CCNC: 24 U/L
BILIRUB SERPL-MCNC: 0.2 MG/DL
BUN SERPL-MCNC: 28 MG/DL
CALCIUM SERPL-MCNC: 9.5 MG/DL
CHLORIDE SERPL-SCNC: 105 MMOL/L
CO2 SERPL-SCNC: 16 MMOL/L
CREAT SERPL-MCNC: 0.89 MG/DL
EGFRCR SERPLBLD CKD-EPI 2021: 96 ML/MIN/1.73M2
GLUCOSE SERPL-MCNC: 151 MG/DL
NT-PROBNP SERPL-MCNC: 641 PG/ML
POTASSIUM SERPL-SCNC: 5.4 MMOL/L
PROT SERPL-MCNC: 7.1 G/DL
SODIUM SERPL-SCNC: 145 MMOL/L

## 2025-06-05 NOTE — BRIEF OPERATIVE NOTE - FIRST ASSIST
Cholesterol liver kidney sugar thyroid vitamin D levels look good.  Current medications.   Resident/Fellow

## 2025-06-12 ENCOUNTER — APPOINTMENT (OUTPATIENT)
Dept: HEART FAILURE | Facility: CLINIC | Age: 65
End: 2025-06-12

## 2025-07-10 ENCOUNTER — RESULT REVIEW (OUTPATIENT)
Age: 65
End: 2025-07-10

## 2025-07-16 ENCOUNTER — NON-APPOINTMENT (OUTPATIENT)
Age: 65
End: 2025-07-16

## 2025-07-18 ENCOUNTER — NON-APPOINTMENT (OUTPATIENT)
Age: 65
End: 2025-07-18

## 2025-07-18 ENCOUNTER — APPOINTMENT (OUTPATIENT)
Dept: ENDOCRINOLOGY | Facility: CLINIC | Age: 65
End: 2025-07-18
Payer: MEDICAID

## 2025-07-18 VITALS
HEIGHT: 66 IN | WEIGHT: 149 LBS | SYSTOLIC BLOOD PRESSURE: 124 MMHG | OXYGEN SATURATION: 99 % | DIASTOLIC BLOOD PRESSURE: 80 MMHG | BODY MASS INDEX: 23.95 KG/M2 | HEART RATE: 54 BPM

## 2025-07-18 LAB — GLUCOSE BLDC GLUCOMTR-MCNC: 168

## 2025-07-18 PROCEDURE — 95251 CONT GLUC MNTR ANALYSIS I&R: CPT

## 2025-07-18 PROCEDURE — 82962 GLUCOSE BLOOD TEST: CPT

## 2025-07-18 PROCEDURE — 99214 OFFICE O/P EST MOD 30 MIN: CPT

## 2025-07-24 ENCOUNTER — APPOINTMENT (OUTPATIENT)
Dept: HEART AND VASCULAR | Facility: CLINIC | Age: 65
End: 2025-07-24
Payer: MEDICAID

## 2025-07-24 VITALS
OXYGEN SATURATION: 98 % | WEIGHT: 149 LBS | BODY MASS INDEX: 24.05 KG/M2 | HEART RATE: 58 BPM | SYSTOLIC BLOOD PRESSURE: 132 MMHG | DIASTOLIC BLOOD PRESSURE: 68 MMHG

## 2025-07-24 DIAGNOSIS — I25.10 ATHEROSCLEROTIC HEART DISEASE OF NATIVE CORONARY ARTERY W/OUT ANGINA PECTORIS: ICD-10-CM

## 2025-07-24 DIAGNOSIS — I63.9 CEREBRAL INFARCTION, UNSPECIFIED: ICD-10-CM

## 2025-07-24 DIAGNOSIS — E78.5 HYPERLIPIDEMIA, UNSPECIFIED: ICD-10-CM

## 2025-07-24 DIAGNOSIS — Z95.1 PRESENCE OF AORTOCORONARY BYPASS GRAFT: ICD-10-CM

## 2025-07-24 DIAGNOSIS — I50.20 UNSPECIFIED SYSTOLIC (CONGESTIVE) HEART FAILURE: ICD-10-CM

## 2025-07-24 DIAGNOSIS — I25.5 ISCHEMIC CARDIOMYOPATHY: ICD-10-CM

## 2025-07-24 PROCEDURE — 93000 ELECTROCARDIOGRAM COMPLETE: CPT

## 2025-07-24 PROCEDURE — G2211 COMPLEX E/M VISIT ADD ON: CPT | Mod: NC

## 2025-07-24 PROCEDURE — 99214 OFFICE O/P EST MOD 30 MIN: CPT

## 2025-08-08 ENCOUNTER — APPOINTMENT (OUTPATIENT)
Dept: ENDOCRINOLOGY | Facility: CLINIC | Age: 65
End: 2025-08-08

## 2025-09-16 ENCOUNTER — APPOINTMENT (OUTPATIENT)
Dept: HEART AND VASCULAR | Facility: CLINIC | Age: 65
End: 2025-09-16

## (undated) DEVICE — DRAPE FLUID WARMER 44 X 66"

## (undated) DEVICE — FOLEY TRAY 16FR SURESTEP LF URINE METER TEMP SENSING STATLOCK

## (undated) DEVICE — DRAPE U POLY BLUE 60X72"

## (undated) DEVICE — Device

## (undated) DEVICE — PREP SCRUB BRUSH W CHG 4%

## (undated) DEVICE — SUT PROLENE 7-0 24" BV175-6

## (undated) DEVICE — SUT SILK 0 18" CT-1 (POP-OFF)

## (undated) DEVICE — ELCTR STRYKER NEPTUNE SMOKE EVACUATION PENCIL (GREEN)

## (undated) DEVICE — SUT PROLENE BV 130-5 8-0

## (undated) DEVICE — SUT VICRYL PLUS 0 27" CT

## (undated) DEVICE — DRSG MEPILEX 10 X 25CM (4 X 10") AG

## (undated) DEVICE — SUT SILK 2-0 30" SH

## (undated) DEVICE — SUT MONOCRYL 4-0 27" PS-2 UNDYED

## (undated) DEVICE — DRSG DERMABOND 0.7ML

## (undated) DEVICE — TONGUE DEPRESSOR

## (undated) DEVICE — SUT PROLENE 6-0 30" RB-2

## (undated) DEVICE — ACROBAT I-POSITIONER

## (undated) DEVICE — TUBING SMOKE EVAC 3/8" X 10FT FOR NEPTUNE

## (undated) DEVICE — CATH TRIOX OXIMETRY 8F 3 LUMENS

## (undated) DEVICE — PACK PROC CV DRAPE

## (undated) DEVICE — SUT PROLENE 3-0 36" SH-1

## (undated) DEVICE — CATH NG SALEM SUMP 16FR

## (undated) DEVICE — SUT VICRYL 1 36" CTX UNDYED

## (undated) DEVICE — BLADE SCALPEL SAFETY #11 WITH PLASTIC GREEN HANDLE

## (undated) DEVICE — WARMING BLANKET FULL ADULT

## (undated) DEVICE — SUT STAINLESS STEEL 7 4-18" CCS

## (undated) DEVICE — SUT NUROLON 1 18" OS-8 (POP-OFF)

## (undated) DEVICE — DRSG TEGADERM CHG 4X6-1/8"

## (undated) DEVICE — GLV 6 PROTEXIS (WHITE)

## (undated) DEVICE — KNIFE ALCON STANDARD FULL HANDLE 15 DEG (PINK)

## (undated) DEVICE — SUT ETHIBOND 0 18" TIES

## (undated) DEVICE — PUNCH VASC STD 7.75" HANDLE 2.8MM DISP

## (undated) DEVICE — GLV 7 PROTEXIS (WHITE)

## (undated) DEVICE — SUT STAINLESS STEEL 6 4-18" CCS

## (undated) DEVICE — SUT SILK 2-0 18" SH (POP-OFF)

## (undated) DEVICE — LAP PAD 18 X 18"

## (undated) DEVICE — SUMP INTRACARDIAC/PERICARDIAL 20FR 1/4" ADULT

## (undated) DEVICE — ELCTR REM POLYHESIVE ADULT PT RETURN 15FT

## (undated) DEVICE — PACING CABLE (BLUE) ATRIAL TEMP SCREW DOWN 12FT

## (undated) DEVICE — SUT PROLENE 7-0 24" BV-1

## (undated) DEVICE — GLV 6.5 PROTEXIS (WHITE)

## (undated) DEVICE — SUT DOUBLE 6 WIRE STERNAL

## (undated) DEVICE — BLADE SCALPEL SAFETY #10 WITH PLASTIC GREEN HANDLE

## (undated) DEVICE — PACK OPEN HEART LNX

## (undated) DEVICE — PUNCH VASC STD 7.75" HANDLE 4.8MM DISP

## (undated) DEVICE — DRAIN RESERVOIR FOR JACKSON PRATT 100CC CARDINAL

## (undated) DEVICE — DRAPE IOBAN 33" X 23"

## (undated) DEVICE — SUCTION CATH AIRLIFE CONTROL VALVE TRIFLO 14FR

## (undated) DEVICE — TUBING STRYKER PNEUMOCLEAR HIGH FLOW

## (undated) DEVICE — VASOVIEW HEMOPRO ENDO SYSTEM

## (undated) DEVICE — GLV 7.5 PROTEXIS (WHITE)

## (undated) DEVICE — GAUZE SPONGE 12PLY DMT MT 8X4

## (undated) DEVICE — DRSG TRACH DRAINAGE 4X4

## (undated) DEVICE — ACROBAT I-STABILIZER

## (undated) DEVICE — DRSG ALLEVYN LIFE 6 X 6 (PINK)

## (undated) DEVICE — SUT ETHIBOND 4-0 12-18"

## (undated) DEVICE — SOL ANTI FOG

## (undated) DEVICE — BLOWER MISTER AXIUS WITH IV SET

## (undated) DEVICE — DRAPE PROBE COVER 5" X 96"

## (undated) DEVICE — PUNCH VASC STD 7.75" HANDLE 4MM DISP

## (undated) DEVICE — CLIPPER BLADE GENERAL USE

## (undated) DEVICE — PACK PROCEDURE HARVEST SMARTPREP APC-60

## (undated) DEVICE — SUT VICRYL 2-0 27" CT-1

## (undated) DEVICE — CHEST DRAIN PLEUR-EVAC DRY/WET ADULT-PEDS SINGLE (QUICK)

## (undated) DEVICE — SYNOVIS VASCULAR PROBE 1.5MM 15CM

## (undated) DEVICE — NDL BLUNT 18G LOOP VESSEL MAXI WHITE

## (undated) DEVICE — SUT PROLENE 8-0 24" BV175-6

## (undated) DEVICE — CATH CV TRAY INSR ST UNIV

## (undated) DEVICE — GOWN TRIMAX XXL

## (undated) DEVICE — POSITIONER FOAM EGG CRATE ULNAR 2PCS (PINK)

## (undated) DEVICE — DRAPE TOWEL BLUE 17" X 24"

## (undated) DEVICE — MEDTRONIC URCHIN EVO HEART POSITIONER & CANISTER TUBING SET

## (undated) DEVICE — SUT VICRYL 0 27" CT

## (undated) DEVICE — AROS VESSEL CLAMP SINGLE LARGE (YELLOW) 120G

## (undated) DEVICE — BLADE SURGICAL #15 CARBON

## (undated) DEVICE — ELCTR BOVIE TIP BLADE INSULATED 4" EDGE

## (undated) DEVICE — VESSEL LOOP MAXI-BLUE 0.120" X 16"

## (undated) DEVICE — PACING CABLE (BROWN) A/V TEMP SCREW DOWN 12FT

## (undated) DEVICE — ELCTR ZOLL DEFIBRILLATOR PAD NO REPLACEMENT

## (undated) DEVICE — CATH IV SAFE BC 24G X 0.75" (YELLOW)